# Patient Record
Sex: FEMALE | Race: WHITE | NOT HISPANIC OR LATINO | Employment: FULL TIME | URBAN - NONMETROPOLITAN AREA
[De-identification: names, ages, dates, MRNs, and addresses within clinical notes are randomized per-mention and may not be internally consistent; named-entity substitution may affect disease eponyms.]

---

## 2017-02-20 ENCOUNTER — HOSPITAL ENCOUNTER (OUTPATIENT)
Dept: RADIOLOGY | Facility: OTHER | Age: 27
End: 2017-02-20
Attending: OBSTETRICS & GYNECOLOGY

## 2017-02-20 DIAGNOSIS — N83.202 CYST OF LEFT OVARY: ICD-10-CM

## 2017-02-20 DIAGNOSIS — N83.201 CYST OF RIGHT OVARY: ICD-10-CM

## 2017-04-10 ENCOUNTER — HISTORY (OUTPATIENT)
Dept: FAMILY MEDICINE | Facility: OTHER | Age: 27
End: 2017-04-10

## 2017-04-10 ENCOUNTER — OFFICE VISIT - GICH (OUTPATIENT)
Dept: FAMILY MEDICINE | Facility: OTHER | Age: 27
End: 2017-04-10

## 2017-04-10 DIAGNOSIS — J06.9 ACUTE UPPER RESPIRATORY INFECTION: ICD-10-CM

## 2017-04-10 DIAGNOSIS — H92.01 OTALGIA OF RIGHT EAR: ICD-10-CM

## 2017-04-10 DIAGNOSIS — H69.81 OTHER SPECIFIED DISORDERS OF EUSTACHIAN TUBE, RIGHT EAR: ICD-10-CM

## 2017-04-10 DIAGNOSIS — B97.89 OTHER VIRAL AGENTS AS THE CAUSE OF DISEASES CLASSIFIED ELSEWHERE: ICD-10-CM

## 2017-08-14 ENCOUNTER — OFFICE VISIT - GICH (OUTPATIENT)
Dept: FAMILY MEDICINE | Facility: OTHER | Age: 27
End: 2017-08-14

## 2017-08-14 ENCOUNTER — HISTORY (OUTPATIENT)
Dept: FAMILY MEDICINE | Facility: OTHER | Age: 27
End: 2017-08-14

## 2017-08-14 DIAGNOSIS — M67.40 GANGLION: ICD-10-CM

## 2017-09-29 ENCOUNTER — HISTORY (OUTPATIENT)
Dept: FAMILY MEDICINE | Facility: OTHER | Age: 27
End: 2017-09-29

## 2017-09-29 ENCOUNTER — OFFICE VISIT - GICH (OUTPATIENT)
Dept: FAMILY MEDICINE | Facility: OTHER | Age: 27
End: 2017-09-29

## 2017-09-29 DIAGNOSIS — J01.00 ACUTE MAXILLARY SINUSITIS: ICD-10-CM

## 2017-10-27 ENCOUNTER — COMMUNICATION - GICH (OUTPATIENT)
Dept: FAMILY MEDICINE | Facility: OTHER | Age: 27
End: 2017-10-27

## 2017-10-27 DIAGNOSIS — Z00.00 ENCOUNTER FOR GENERAL ADULT MEDICAL EXAMINATION WITHOUT ABNORMAL FINDINGS: ICD-10-CM

## 2017-11-27 ENCOUNTER — HISTORY (OUTPATIENT)
Dept: FAMILY MEDICINE | Facility: OTHER | Age: 27
End: 2017-11-27

## 2017-11-27 ENCOUNTER — OFFICE VISIT - GICH (OUTPATIENT)
Dept: FAMILY MEDICINE | Facility: OTHER | Age: 27
End: 2017-11-27

## 2017-11-27 DIAGNOSIS — Z00.00 ENCOUNTER FOR GENERAL ADULT MEDICAL EXAMINATION WITHOUT ABNORMAL FINDINGS: ICD-10-CM

## 2017-11-27 DIAGNOSIS — E03.8 OTHER SPECIFIED HYPOTHYROIDISM: ICD-10-CM

## 2017-11-27 DIAGNOSIS — Z23 ENCOUNTER FOR IMMUNIZATION: ICD-10-CM

## 2017-11-27 LAB
T4 FREE SERPL-MCNC: 0.93 NG/DL (ref 0.58–1.64)
TSH - HISTORICAL: 3.76 UIU/ML (ref 0.34–5.6)

## 2017-12-04 ENCOUNTER — AMBULATORY - GICH (OUTPATIENT)
Dept: FAMILY MEDICINE | Facility: OTHER | Age: 27
End: 2017-12-04

## 2017-12-27 NOTE — PROGRESS NOTES
"Patient Information     Patient Name MRN Sex Amanda Ritchie [id number] Female 1990      Progress Notes by Amanda Joel NP at 2017  5:30 PM     Author:  Amanda Joel NP Service:  (none) Author Type:  PHYS- Nurse Practitioner     Filed:  10/1/2017  4:38 PM Encounter Date:  2017 Status:  Signed     :  Amanda Joel NP (PHYS- Nurse Practitioner)            SUBJECTIVE:   Amanda Mckeon is a 26 y.o. female  who complains of sinus and nasal congestion, sneezing and sore throat for 8   days. She  denies a history of fevers,  Ear pain, SOB or difficulty breathing. She doesn't have a history of asthma and isn't a smoker.She has been treating symptoms with Claritin and Equate Brand Cough and Cold Medicine. Also blood pressure is elevated today. Denies history of hypertension. No chest pain, SOB or difficulty breathing.  has a BP cough at home.  OBJECTIVE:  She  appears well.  BP (!) 168/110  Pulse 100  Temp 97.6  F (36.4  C) (Tympanic)   Resp 20  Ht 1.626 m (5' 4\")  Breastfeeding? No   Ears normal.  Throat and pharynx normal.  Neck supple. No adenopathy in the neck. Nose is congested. Maxillary sinuses tender bilaterall. The chest is clear, without wheezes or rales. Heart has regular rate and rhythm, no murmur.    ASSESSMENT:   Diagnoses: Maxillary Sinusitis  Elevated BP    PLAN:  Please check blood pressure at home on 2 or 3 separate occasions, if it remains elevated, please follow up with PCP  Treat with Omnicef 300 mgs PO BID 10 days :\"push fluids\",\"rest\", if symptoms persist or worsen Call or return to clinic        "

## 2017-12-28 NOTE — PATIENT INSTRUCTIONS
Patient Information     Patient Name MRN Sex Amanda Ritchie [id number] Female 1990      Patient Instructions by Amanda Joel NP at 2017  5:30 PM     Author:  Amanda Joel NP Service:  (none) Author Type:  PHYS- Nurse Practitioner     Filed:  2017  6:03 PM Encounter Date:  2017 Status:  Signed     :  Amanda Joel NP (PHYS- Nurse Practitioner)               Index Uzbek Related topics   Sinusitis   ________________________________________________________________________  KEY POINTS    Sinusitis is swelling and irritation of the linings of the sinuses, the hollow spaces in the bones of your face and front of your skull.    You may need to take medicine for your stuffy nose, pain, infection, or swelling.    Use saline nasal sprays or rinses to help wash out nasal passages if you have a sinus infection. A humidifier can add moisture to the air also.  ________________________________________________________________________  What is sinusitis?  Sinusitis is swelling and irritation of the linings of the sinuses. The sinuses are hollow spaces in the bones of your face and front of your skull. They connect with the nose through small openings. Like the nose, they are lined with tissue (membranes) that make mucus. Mucus drains through the small openings to the nose.  What is the cause?  The passageways from the sinuses to the nose are very narrow. When drainage of mucus from the sinuses is blocked, the sinuses get swollen and irritated. They may also become infected with bacteria, a virus, or even fungus. Allergies or irritation from pollen, mold, dust, or smoke can also cause swelling of the sinuses. Sometimes a tooth infection spreads to the sinuses.  You may be more likely to get sinus congestion and infections if you have:    Severe or untreated seasonal or year-round allergies    Injured the bones in your nose    A deformity of the nose that  causes the sinuses not to drain properly    Small growths called polyps in the sinuses that partially block the sinus openings  What are the symptoms?  Symptoms may include:    Feeling of fullness or pressure in your face or head    A headache that is most painful when you first wake up in the morning or when you bend over and put your head down    Pain in your face    Aching in the upper jaw and teeth    Runny or stuffy nose    Cough, especially at night    Fluid draining down the back of your throat (postnasal drip)    Sore throat, especially in the morning or evening  How is it diagnosed?  Your healthcare provider will ask about your symptoms and medical history and examine you. Tests are often not needed but may include:    X-ray of your sinuses    CT scan, which uses X-rays and a computer to show detailed pictures of the sinuses  How is it treated?  Several kinds of medicine may help:    Nonprescription pain medicine, such as acetaminophen, ibuprofen, or naproxen. Read the label and take as directed. Unless recommended by your healthcare provider, you should not take these medicines for more than 10 days.    Nonsteroidal anti-inflammatory medicines (NSAIDs), such as ibuprofen, naproxen, and aspirin, may cause stomach bleeding and other problems. These risks increase with age.    Acetaminophen may cause liver damage or other problems. Unless recommended by your provider, don't take more than 3000 milligrams (mg) in 24 hours. To make sure you don t take too much, check other medicines you take to see if they also contain acetaminophen. Ask your provider if you need to avoid drinking alcohol while taking this medicine.    Decongestants pills or nasal sprays to reduce swelling in your nose and sinuses and lessen the amount of mucus. Use decongestants as directed. If you are using a nonprescription nasal-spray decongestant, generally you should not use it for more than 3 days. After 3 days it may make your symptoms  worse. Ask your healthcare provider if it is OK for you to use a nasal spray decongestant longer than this.    Antihistamine tablets or a nasal spray to treat the allergies during your allergy season or, in some cases, year-round. Antihistamines block the effect of a chemical your body makes when you have an allergic reaction.    Antibiotics, if your provider thinks you might have a sinus infection    Steroid nasal spray if your provider thinks it may help clear your sinuses  If sinusitis is still a problem despite treatment, you may be referred to an allergy specialist or an ear, nose, and throat (ENT) specialist. The allergy specialist will check for and help treat your allergies to lessen the chance of having sinusitis. The ENT specialist will check for polyps or a deformed bone that may be blocking your sinuses. You may need surgery to create an extra or enlarged passageway to help your sinuses drain more easily.  Depending on what caused the sinusitis and how severe it is, it may last for days or weeks. For most cases of sinusitis, the symptoms get better gradually over 3 to 10 days.  How can I take care of myself?  Follow the full course of treatment prescribed by your healthcare provider. In addition:    If you are taking an antibiotic, take all of it as directed by your provider. If you stop taking the medicine when your symptoms are gone but before you have taken all of the medicine, symptoms may come back.    Don t smoke, and stay away from others who are smoking.    If you have allergies, try to avoid the things you are allergic to, like animal dander. Use medicine to keep your nose and sinuses open.    Use a humidifier to put more moisture in the air. This can help to open blocked sinuses and relieve pain. Avoid steam vaporizers because they can cause burns. Be sure to keep the humidifier clean, as recommended in the 's instructions. It's important to keep bacteria and mold from growing in  the water container.    Use saline nasal sprays or rinses to help wash out nasal passages if you have a sinus infection. You may use the sprays also to prevent infections.    Get plenty of rest.    Drink more fluids to keep the mucus as thin as possible so your sinuses can drain more easily.    Raise the head of your bed slightly or sleep on extra pillows to help your sinuses drain.    Put warm, moist cloths on painful areas.  Ask your healthcare provider:    How and when you will get your test results    How long it will take to recover    If there are activities you should avoid and when you can return to your normal activities    How to take care of yourself at home    What symptoms or problems you should watch for and what to do if you have them  Make sure you know when you should come back for a checkup. Keep all appointments for provider visits or tests.  How can I help prevent sinusitis?    Treat your colds and allergies promptly. Use decongestants as soon as you start having symptoms, and before you fly, travel to high altitudes, or swim in deep water.    If you smoke, try to quit. Talk to your healthcare provider about ways to quit smoking.  Developed by [employer].  Adult Advisor 2016.3 published by [employer].  Last modified: 2016-03-23  Last reviewed: 2015-08-27  This content is reviewed periodically and is subject to change as new health information becomes available. The information is intended to inform and educate and is not a replacement for medical evaluation, advice, diagnosis or treatment by a healthcare professional.  References   Adult Advisor 2016.3 Index    Copyright   2016 [employer], a division of [employer] All rights reserved.

## 2017-12-28 NOTE — ADDENDUM NOTE
Patient Information     Patient Name MRN Amanda Lr [id number] Female 1990      Addendum Note by Yesenia Gardner MD at 2017 11:34 AM     Author:  Yesenia Gardner MD Service:  (none) Author Type:  Physician     Filed:  2017 11:34 AM Encounter Date:  2017 Status:  Signed     :  Yesenia Gardner MD (Physician)       Addended by: YESENIA GARDNER on: 2017 11:34 AM        Modules accepted: Orders

## 2017-12-28 NOTE — PROGRESS NOTES
Patient Information     Patient Name MRN Amanda Lr [id number] Female 1990      Progress Notes by Johnathon Fitch MD at 2017  4:30 PM     Author:  Johnathon Fitch MD Service:  (none) Author Type:  Physician     Filed:  2017  5:17 PM Encounter Date:  2017 Status:  Signed     :  Johnathon Fitch MD (Physician)            SUBJECTIVE:  26 y.o. female who presents for evaluation of a lump on her left wrist. It's on the volar aspect at the base of the thumb. She noticed it about 2 months ago. She DrMaranda locke about it who told her that she thought it was a ganglion. It bothers her some, when she's been gardening or very picking but otherwise is not too bothersome.    Additional Review of Systems: see HPI:   No new symptoms otherwise    Past Medical History:     Diagnosis  Date     Cyst of right ovary 2016     Hypothyroidism 2015     Recurrent cold sores 2016        Current Outpatient Prescriptions       Medication  Sig Dispense Refill     acyclovir (ZOVIRAX) 800 mg tablet Take 1 tablet by mouth 5 times daily. 50 tablet 3     benzonatate (TESSALON) 100 mg capsule Take 1 capsule by mouth 3 times daily if needed for Cough. 30 capsule 0     codeine-guaiFENesin (ROBITUSSIN AC)  mg/5 mL liquid Take 10 mL by mouth at bedtime if needed for Cough. Max dose 60 mL per 24 hrs. 100 mL 0     levothyroxine (SYNTHROID) 88 mcg tablet Take 1 tablet by mouth once daily. 90 tablet 4     norgestimate-ethinyl estradiol (TRI-LO-SPRINTEC) 0.18/0.215/0.25-25 mg-mcg tablet Take 1 tablet by mouth once daily. 84 tablet 3     triamcinolone, 55 mcg each actuation, nasal (NASACORT AQ) 55 mcg nasal spray Inhale 2 Sprays into both nostrils once daily. 16.5 g 5     No current facility-administered medications for this visit.      Medications have been reviewed by me and are current to the best of my knowledge and ability.      Allergies as of 2017 - Kian as Reviewed 2017  "     Allergen  Reaction Noted     Amoxicillin Hives 05/27/2014        OBJECTIVE:  /80  Pulse 76  Ht 1.626 m (5' 4\")  EXAM:  General Appearance: Pleasant, alert, appropriate appearance for age. No acute distress  Musculoskeletal Exam: Examination of the left wrist reveals a small 8 mm lump on the volar aspect of the wrist just over the junction of the radius with the metacarpal bones at the base of the thumb. It's freely mobile, nontender, and there is no erythema.      ASSESSMENT/PLAN:  Ganglion cyst. This appears quite typical. I explained the pathophysiology, rafa her picture of the mesentery which it arises, and explained treatment. At this point she is just going to watch and wait and see what it does see if it gets smaller. If it bothers her I suggested she call and we will schedule her for orthopedic consultation.  Johnathon Fitch MD ....................  8/14/2017   5:17 PM            "

## 2017-12-28 NOTE — TELEPHONE ENCOUNTER
Patient Information     Patient Name MRN Amanda Lr [id number] Female 1990      Telephone Encounter by Marquita Moore RN at 10/31/2017  3:20 PM     Author:  Marquita Moore RN Service:  (none) Author Type:  NURS- Registered Nurse     Filed:  10/31/2017  3:32 PM Encounter Date:  10/27/2017 Status:  Signed     :  Marquita Moore RN (NURS- Registered Nurse)            Hormones    Office visit in the past 12 months or per provider note.    Last visit with YESENIA GARDNER was on: 2017 in [facility]  Next visit with YESENIA GARDNER is on: 2017 in [facility]  Next visit with Family Practice is on: 2017 in [facility]    Max refill for 12 months from last office visit or per provider note.    Patient is due for medication management appointment. Limited refill provided at this time. Noted upcoming appointment.  Prescription refilled per RN Medication Refill Policy.................... Marquita Moore RN ....................  10/31/2017   3:31 PM

## 2017-12-28 NOTE — PROGRESS NOTES
Patient Information     Patient Name MRAmanda Rocha [id number] Female 1990      Progress Notes by Johnathon Fitch MD at 2017  3:45 PM     Author:  Johnathon Fitch MD Service:  (none) Author Type:  Physician     Filed:  2017  4:52 PM Encounter Date:  2017 Status:  Signed     :  Johnathon Fitch MD (Physician)            HPI-Comes in today for comprehensive evaluation.  She's been feeling fine. She needs medications refilled. She is on the birth control pill and her menstrual periods are normal and regular. She and her  are going on their honeymoon in January to [place] and she is concerned about the Zika virus. She would like to be tested in the spring before she attempts of pregnancy.    She has no new problems or concerns otherwise. She would like to get immunizations updated. She also would like to get a Pap test done now because of attempting pregnancy in the spring.    ROS:  See HPI.  A comprehensive review of systems is otherwise negative.    Past Medical History:     Diagnosis  Date     Cyst of right ovary 2016     Hypothyroidism 2015     Recurrent cold sores 2016     Past Surgical History:      Procedure  Laterality Date     BREAST REDUCTION       No family history on file.  Social History     Substance Use Topics       Smoking status: Never Smoker     Smokeless tobacco: Never Used     Alcohol use No     Allergies      Allergen   Reactions     Amoxicillin  Hives     Childhood        Current Outpatient Prescriptions:      acyclovir (ZOVIRAX) 800 mg tablet, Take 1 tablet by mouth 5 times daily., Disp: 50 tablet, Rfl: 3     levothyroxine (SYNTHROID) 88 mcg tablet, Take 1 tablet by mouth once daily., Disp: 90 tablet, Rfl: 4     norgestimate-ethinyl estradiol (ORTHO TRI-CYCLEN LO, 28,) 0.18/0.215/0.25-25 mg-mcg tablet, Take 1 tablet by mouth once daily., Disp: 84 tablet, Rfl: 3     triamcinolone, 55 mcg each actuation, nasal  (NASACORT AQ) 55 mcg nasal spray, Inhale 2 Sprays into both nostrils once daily., Disp: 16.5 g, Rfl: 5  Medications have been reviewed by me and are current to the best of my knowledge and ability.      EXAM:she is a pleasant healthy-appearing 27 y.o. female in no apparent distress.  She answers questions appropriately and appears well-kempt.  HEENT exam is within normal limits.  .  Neck is supple with good carotid pulses.  No bruits are heard.  No thyromegaly or adenopathy.  Lungs are clear with good air movement.  No rales, rhonchi, or wheezes are heard--  Breasts are normal in appearance, palpably free of masses.    There is no skin dimpling or nipple changes and axilla are negative.  Cardiac exam reveals a regular rhythm with no murmur or gallop appreciated.  Peripheral pulses are equal and strong. Repeat blood pressure by me was 132/88.  Abdomen-soft and nontender with no hepatosplenomegaly or masses.  Bowel sounds are normal and no bruits are heard  Back is straight with no scoliosis or CVA tenderness.     exam was done, and a female nurse in the room was offered and declined, and the exam reveals normal external genitalia.  Vagina is normal. Cervix is normal in appearance. A Pap smear is done..  Uterus is small, anterior, mobile and nontender..  Both adnexa are normal.     Extremities no edema. Good distal pulses.  Neurologic exam is intact and nonfocal  Skin is free of significant lesions.  No rashes are seen  Lymph nodes are nonpalpable in cervical, axillary, and inguinal areas.  Psychiatric: Alert and oriented with normal mood and affect    Lab-thyroid studies are pending  Imaging-none indicated at this time    Assessment-preventive healthcare-normal exam. Tetanus status and hepatitis A vaccines were recommended and given. Others are up-to-date. Thyroid studies done, she'll be notified of the result. Past rooms done as well and she'll be notified of that.    Plan-she'll be notified of lab results and  meds refilled. She'll return in the spring for viral testing for Zika Virus prior to attempting pregnancy. Discussed her blood pressure and she'll continue to monitor with a goal of 130/80 or less.    Johnathon Fitch MD ....................  11/27/2017   4:47 PM

## 2017-12-30 NOTE — NURSING NOTE
Patient Information     Patient Name MRN Amanda Lr [id number] Female 1990      Nursing Note by Mariama Graham at 2017  5:30 PM     Author:  Mariama Graham Service:  (none) Author Type:  (none)     Filed:  2017  5:48 PM Encounter Date:  2017 Status:  Signed     :  Mariama Graham            States that she has had URI/ head congestion for over a week, no fever  Mariama Graham LPN...................2017   5:38 PM

## 2017-12-30 NOTE — NURSING NOTE
Patient Information     Patient Name MRN Sex Amanda Ritchie [id number] Female 1990      Nursing Note by Carly Hopper at 2017  3:45 PM     Author:  Carly Hopper Service:  (none) Author Type:  (none)     Filed:  2017  3:51 PM Encounter Date:  2017 Status:  Signed     :  Carly Hopper            Patient presents to clinic for physical  Carly Gilman ....................  2017   3:49 PM

## 2018-01-03 NOTE — PROGRESS NOTES
Patient Information     Patient Name MRN Sex Amanda Ritchie [id number] Female 1990      Progress Notes by Cassie Duke R.T. ([facility]) at 2017 11:50 AM     Author:  [facility] Service:  (none) Author Type:  [employer] - Registered Radiologic Technologist     Filed:  2017 11:50 AM Date of Service:  2017 11:50 AM Status:  Signed     :  Cassie Duke R.T. ([facility]) ([facility] - Registered Radiologic Technologist)            Falls Risk Criteria:    Age 65 and older or under age 4        Sensory deficits    Poor vision    Use of ambulatory aides    Impaired judgment    Unable to walk independently    Meets High Risk criteria for falls:  no

## 2018-01-04 NOTE — NURSING NOTE
Patient Information     Patient Name MRN Sex Amanda Ritchie [id number] Female 1990      Nursing Note by Carly Hopper at 4/10/2017  5:30 PM     Author:  Carly Hopper Service:  (none) Author Type:  (none)     Filed:  4/10/2017  5:59 PM Encounter Date:  4/10/2017 Status:  Signed     :  Carly Hopper            Patient presents to clinic with sore throat and right ear pain.  Carly Hopper LPN....................  4/10/2017   5:49 PM

## 2018-01-04 NOTE — PATIENT INSTRUCTIONS
Patient Information     Patient Name MRN Sex Amanda Ritchie [id number] Female 1990      Patient Instructions by Olivia Hurt NP at 4/10/2017  5:30 PM     Author:  Olivia Hurt NP Service:  (none) Author Type:  PHYS- Nurse Practitioner     Filed:  4/10/2017  6:08 PM Encounter Date:  4/10/2017 Status:  Signed     :  Oilvia Hurt NP (PHYS- Nurse Practitioner)            Robitussin with codeine at bedtime as needed for cough    Tessalon every 8 hours as needed for cough       Symptoms likely due to virus. No antibiotic is needed at this time.       Most coughs are caused by a viral infection.   Usually coughs can last 2 to 3 weeks. Sometimes the cough becomes loose (wet) for a few days, and your child coughs up a lot of phlegm (mucus). This is usually a sign that the end of the illness is near.    Most sore throats are caused by viruses and are part of a cold. About 10% of sore throats are caused by strep bacteria.    Encouraged fluids and rest.    May use symptomatic care with tylenol or ibuprofen.     Using a humidifier works well to break up the congestion.     Elevate the mattress to 15 degrees in order to help with the congestion.    Frequent swallows of cool liquid.      Oatmeal or honey coats the throat and some patients find it soothes the pain.     Salt water gargles as needed    Return to clinic with change/worsening of symptoms or concerns.

## 2018-01-04 NOTE — PROGRESS NOTES
Patient Information     Patient Name MRN Sex Amanda Ritchie [id number] Female 1990      Progress Notes by Olivia Hurt NP at 4/10/2017  5:30 PM     Author:  Olivia Hurt NP Service:  (none) Author Type:  PHYS- Nurse Practitioner     Filed:  4/10/2017  7:36 PM Encounter Date:  4/10/2017 Status:  Signed     :  Olivia Hurt NP (PHYS- Nurse Practitioner)            HPI:    Amanda Mckeon is a 26 y.o. female who presents to clinic today for URI.  Cough x 4 days.  Sore throat, chest congestion x 3 days.  Right ear pain started today.  Both ears feel plugged.   Sore throat worsening yesterday morning.  Runny and stuffy started yesterday.  Non productive cough.  Chest tightness.  Feeling winded with activity.  Headache from coughing.  Upper body aches initially.  No fevers, chills or sweats.  Appetite normal.  No vomiting or diarrhea.  Energy decreased.  Taking cough syrup, daytime/night time cold, cough drops.            Past Medical History:     Diagnosis  Date     Cyst of right ovary 2016     Hypothyroidism 2015     Recurrent cold sores 2016     Past Surgical History:      Procedure  Laterality Date     BREAST REDUCTION       Social History     Substance Use Topics       Smoking status: Never Smoker     Smokeless tobacco: Never Used     Alcohol use No     Current Outpatient Prescriptions       Medication  Sig Dispense Refill     acyclovir (ZOVIRAX) 800 mg tablet Take 1 tablet by mouth 5 times daily. 50 tablet 3     levothyroxine (SYNTHROID) 88 mcg tablet Take 1 tablet by mouth once daily. 90 tablet 4     norgestimate-ethinyl estradiol (TRI-LO-SPRINTEC) 0.18/0.215/0.25-25 mg-mcg tablet Take 1 tablet by mouth once daily. 84 tablet 3     triamcinolone, 55 mcg each actuation, nasal (NASACORT AQ) 55 mcg nasal spray Inhale 2 Sprays into both nostrils once daily. 16.5 g 5     No current facility-administered medications for this visit.      Medications have been reviewed  by me and are current to the best of my knowledge and ability.    Allergies      Allergen   Reactions     Amoxicillin  Hives     Childhood        ROS:  Refer to HPI    /68  Pulse 84  Temp 98.7  F (37.1  C) (Tympanic)   Resp 18  LMP 03/29/2017  Breastfeeding? No      EXAM:  General Appearance: Well appearing adult female, appropriate appearance for age. No acute distress  Head: normocephalic, atraumatic  Ears: Left TM with bony landmarks appreciated, no erythema, no effusion, no bulging, no purulence.  Right TM with bony landmarks appreciated, erythema with serous effusion with bulging, no purulence.   Left auditory canal clear.  Right auditory canal clear.  Normal external ears, non tender.  Eyes: conjunctivae normal, no drainage  Orophayrnx: moist mucous membranes, posterior pharynx without erythema, tonsils without hypertrophy, no erythema, no exudates or petechiae, no post nasal drip seen.    Neck: supple without adenopathy  Respiratory: normal chest wall and respirations.  Normal effort.  Clear to auscultation bilaterally, no wheezes or rhonchi or congestion, no cough appreciated  Cardiac: RRR with no murmurs  Psychological: normal affect, alert and pleasant        ASSESSMENT/PLAN:    ICD-10-CM    1. Viral URI with cough J06.9 benzonatate (TESSALON) 100 mg capsule     B97.89 codeine-guaiFENesin (ROBITUSSIN AC)  mg/5 mL liquid   2. Acute ear pain, right H92.01    3. Eustachian tube dysfunction, right H69.81          Likely viral illness.  No antibiotics indicated at this time.  Robitussin with codeine at bedtime PRN  Tessalon 100 mg TID PRN  Encouraged fluids  Symptomatic treatment - salt water gargles, honey, elevation, humidifier, sinus rinse/netti pot, lozenges, etc   Tylenol or ibuprofen PRN  Follow up if symptoms persist or worsen or concerns        Patient Instructions   Robitussin with codeine at bedtime as needed for cough    Tessalon every 8 hours as needed for cough       Symptoms  likely due to virus. No antibiotic is needed at this time.       Most coughs are caused by a viral infection.   Usually coughs can last 2 to 3 weeks. Sometimes the cough becomes loose (wet) for a few days, and your child coughs up a lot of phlegm (mucus). This is usually a sign that the end of the illness is near.    Most sore throats are caused by viruses and are part of a cold. About 10% of sore throats are caused by strep bacteria.    Encouraged fluids and rest.    May use symptomatic care with tylenol or ibuprofen.     Using a humidifier works well to break up the congestion.     Elevate the mattress to 15 degrees in order to help with the congestion.    Frequent swallows of cool liquid.      Oatmeal or honey coats the throat and some patients find it soothes the pain.     Salt water gargles as needed    Return to clinic with change/worsening of symptoms or concerns.

## 2018-01-16 ENCOUNTER — COMMUNICATION - GICH (OUTPATIENT)
Dept: FAMILY MEDICINE | Facility: OTHER | Age: 28
End: 2018-01-16

## 2018-01-16 ENCOUNTER — HISTORY (OUTPATIENT)
Dept: EMERGENCY MEDICINE | Facility: OTHER | Age: 28
End: 2018-01-16

## 2018-01-27 VITALS — DIASTOLIC BLOOD PRESSURE: 80 MMHG | HEIGHT: 64 IN | SYSTOLIC BLOOD PRESSURE: 122 MMHG | HEART RATE: 76 BPM

## 2018-01-27 VITALS
SYSTOLIC BLOOD PRESSURE: 130 MMHG | HEART RATE: 80 BPM | DIASTOLIC BLOOD PRESSURE: 110 MMHG | DIASTOLIC BLOOD PRESSURE: 98 MMHG | RESPIRATION RATE: 20 BRPM | HEIGHT: 64 IN | HEIGHT: 64 IN | SYSTOLIC BLOOD PRESSURE: 150 MMHG | HEART RATE: 100 BPM | TEMPERATURE: 97.6 F

## 2018-01-27 VITALS
SYSTOLIC BLOOD PRESSURE: 118 MMHG | TEMPERATURE: 98.7 F | HEART RATE: 84 BPM | RESPIRATION RATE: 18 BRPM | DIASTOLIC BLOOD PRESSURE: 68 MMHG

## 2018-02-01 ENCOUNTER — DOCUMENTATION ONLY (OUTPATIENT)
Dept: FAMILY MEDICINE | Facility: OTHER | Age: 28
End: 2018-02-01

## 2018-02-01 PROBLEM — M67.40 GANGLION CYST: Status: ACTIVE | Noted: 2017-08-14

## 2018-02-01 RX ORDER — TRIAMCINOLONE ACETONIDE 55 UG/1
2 SPRAY, METERED NASAL DAILY
COMMUNITY
Start: 2017-11-27 | End: 2018-08-10

## 2018-02-01 RX ORDER — LEVOTHYROXINE SODIUM 88 UG/1
88 TABLET ORAL DAILY
COMMUNITY
Start: 2017-11-28 | End: 2018-08-12

## 2018-02-01 RX ORDER — NORGESTIMATE AND ETHINYL ESTRADIOL 7DAYSX3 LO
1 KIT ORAL DAILY
COMMUNITY
Start: 2017-11-27 | End: 2018-08-10

## 2018-02-01 RX ORDER — ACYCLOVIR 800 MG/1
800 TABLET ORAL
COMMUNITY
Start: 2017-11-27 | End: 2018-08-10

## 2018-02-12 NOTE — PROGRESS NOTES
Patient Information     Patient Name MRN Sex     Amanda Alfred [id number] Female 1990      Progress Notes signed by Yesenia Gardner MD at 2017 10:21 AM      Author:  Yesenia Gardner MD Service:  (none) Author Type:  Physician     Filed:  2017 10:21 AM Encounter Date:  2017 Status:  Signed     :  Yesenia Gardner MD (Physician)            2017      AMANDA ALFRED  [address] [place] [address]      RE: AMANDA ALFRED  MR#: [id number]  : 1990        Dear Ms. Alfred:    The results of your Pap test returned normal, which is excellent news.  Pap test need only be repeated every 3 years.    Sincerely yours,        YESENIA GARDNER MD      WR/sn  D:2017 08:57:34  T:2017 09:25:05  VJID: [id number]  TJID: [id number]    cc:  Enclosure(s):

## 2018-02-13 ENCOUNTER — HOSPITAL ENCOUNTER (OUTPATIENT)
Dept: GENERAL RADIOLOGY | Facility: OTHER | Age: 28
Discharge: HOME | End: 2018-02-13
Attending: NURSE PRACTITIONER | Admitting: NURSE PRACTITIONER
Payer: COMMERCIAL

## 2018-02-13 ENCOUNTER — OFFICE VISIT (OUTPATIENT)
Dept: FAMILY MEDICINE | Facility: OTHER | Age: 28
End: 2018-02-13
Attending: NURSE PRACTITIONER
Payer: COMMERCIAL

## 2018-02-13 VITALS
SYSTOLIC BLOOD PRESSURE: 134 MMHG | DIASTOLIC BLOOD PRESSURE: 90 MMHG | TEMPERATURE: 100.8 F | HEART RATE: 104 BPM | HEIGHT: 64 IN

## 2018-02-13 DIAGNOSIS — J06.9 URI WITH COUGH AND CONGESTION: Primary | ICD-10-CM

## 2018-02-13 DIAGNOSIS — J06.9 URI WITH COUGH AND CONGESTION: ICD-10-CM

## 2018-02-13 LAB
BASOPHILS # BLD AUTO: 0 10E9/L (ref 0–0.2)
BASOPHILS NFR BLD AUTO: 0.4 %
DIFFERENTIAL METHOD BLD: NORMAL
EOSINOPHIL # BLD AUTO: 0 10E9/L (ref 0–0.7)
EOSINOPHIL NFR BLD AUTO: 0.7 %
ERYTHROCYTE [DISTWIDTH] IN BLOOD BY AUTOMATED COUNT: 12.4 % (ref 10–15)
HCT VFR BLD AUTO: 39.2 % (ref 35–47)
HGB BLD-MCNC: 13.3 G/DL (ref 11.7–15.7)
IMM GRANULOCYTES # BLD: 0.1 10E9/L (ref 0–0.4)
IMM GRANULOCYTES NFR BLD: 2.5 %
LYMPHOCYTES # BLD AUTO: 1.4 10E9/L (ref 0.8–5.3)
LYMPHOCYTES NFR BLD AUTO: 31.3 %
MCH RBC QN AUTO: 29.3 PG (ref 26.5–33)
MCHC RBC AUTO-ENTMCNC: 33.9 G/DL (ref 31.5–36.5)
MCV RBC AUTO: 86 FL (ref 78–100)
MONOCYTES # BLD AUTO: 0.6 10E9/L (ref 0–1.3)
MONOCYTES NFR BLD AUTO: 13.6 %
NEUTROPHILS # BLD AUTO: 2.3 10E9/L (ref 1.6–8.3)
NEUTROPHILS NFR BLD AUTO: 51.5 %
PLATELET # BLD AUTO: 322 10E9/L (ref 150–450)
RBC # BLD AUTO: 4.54 10E12/L (ref 3.8–5.2)
WBC # BLD AUTO: 4.5 10E9/L (ref 4–11)

## 2018-02-13 PROCEDURE — 99214 OFFICE O/P EST MOD 30 MIN: CPT | Performed by: NURSE PRACTITIONER

## 2018-02-13 PROCEDURE — 85025 COMPLETE CBC W/AUTO DIFF WBC: CPT | Performed by: NURSE PRACTITIONER

## 2018-02-13 PROCEDURE — 36415 COLL VENOUS BLD VENIPUNCTURE: CPT | Performed by: NURSE PRACTITIONER

## 2018-02-13 PROCEDURE — 71046 X-RAY EXAM CHEST 2 VIEWS: CPT

## 2018-02-13 RX ORDER — BENZONATATE 100 MG/1
100 CAPSULE ORAL 3 TIMES DAILY PRN
Qty: 30 CAPSULE | Refills: 0 | Status: SHIPPED | OUTPATIENT
Start: 2018-02-13 | End: 2018-08-10

## 2018-02-13 RX ORDER — FLUTICASONE PROPIONATE 50 MCG
1 SPRAY, SUSPENSION (ML) NASAL DAILY
Qty: 1 BOTTLE | Refills: 0 | Status: SHIPPED | OUTPATIENT
Start: 2018-02-13 | End: 2018-08-10

## 2018-02-13 ASSESSMENT — PAIN SCALES - GENERAL: PAINLEVEL: SEVERE PAIN (7)

## 2018-02-13 NOTE — MR AVS SNAPSHOT
After Visit Summary   2/13/2018    Amanda Mckeon    MRN: [id number]           Patient Information     Date Of Birth          1990        Visit Information        Provider Department      2/13/2018 5:30 PM Grace Singleton NP [facility]        Today's Diagnoses     URI with cough and congestion    -  1      Care Instructions    Cold Medicines   What are cold medicines?  Symptoms of the common cold start gradually over several days and usually last about two weeks. Symptoms may include sneezing, a stuffy or runny nose, sore throat, cough, watery eyes, mild headache, or body aches. A cold will go away on its own without treatment. However, there are many nonprescription products that may help relieve some of the symptoms of a cold. Cold medicines often contain more than one ingredient and are used to treat more than one symptom. Read the labels and buy products that have only the ingredients that you need. If you are not sure which medicine is best, ask your pharmacist.  How do they work?  Decongestants reduce swelling in your nose and sinuses. They may also lessen the amount of mucus made by your nose. If you use decongestants more often than directed, your stuffy nose may get worse.   Antihistamines block the effect of histamine. Histamine is a chemical your body makes when you have an allergic reaction. Antihistamines are most often used to treat itchy or watery eyes or a stuffy or runny nose caused by an allergy. Antihistamines may not help a stuffy or runny nose caused by a cold because they can make mucus thick and dry.  Mucolytics are medicines that make mucus thinner so that it is easier to cough up out of your throat and lungs.  Expectorants are cough medicines that may help to keep the mucus thin and bring up mucus from the lungs when you cough. This may relieve chest congestion and make it easier to breathe.   Cough suppressants (antitussives) are medicines that  lessen the urge to cough. They may give relief from a dry, hacking cough. If you have a cough that is wet sounding and produces mucus, it is important for you to cough the mucus up out of your lungs. For this reason, cough suppressants are not recommended for a wet sounding cough.  Fever and pain relievers, such as acetaminophen, aspirin, or other nonsteroidal anti-inflammatory drugs (NSAIDs), are often included in cold medicine. Read labels carefully to avoid taking more medicine than you need.  What else do I need to know about this medicine?    Talk to your healthcare provider if your symptoms start suddenly or you have severe symptoms. This may mean you have something more serious than a cold.    Follow the directions that come with your medicine, including information about food or alcohol. Make sure you know how and when to take your medicine. Do not take more or less than you are supposed to take.    Try to get all of your medicine at the same place. Your pharmacist can help make sure that all of your medicines are safe to take together.    Keep a list of your medicines with you. List all of the prescription medicines, nonprescription medicines, supplements, natural remedies, and vitamins that you take. Tell all healthcare providers who treat you about all of the products you are taking.    Many medicines have side effects. A side effect is a symptom or problem that is caused by the medicine. Ask your healthcare provider or pharmacist what side effects the medicine may cause and what you should do if you have side effects.    Nonsteroidal anti-inflammatory medicines (NSAIDs), such as ibuprofen, naproxen, and aspirin, may cause stomach bleeding and other problems. These risks increase with age. Read the label and take as directed. Unless recommended by your healthcare provider, do not take for more than 10 days for any reason.    Acetaminophen may cause liver damage or other problems. Unless recommended by  "your provider, don't take more than 3000 milligrams (mg) in 24 hours. To make sure you don t take too much, check other medicines you take to see if they also contain acetaminophen. Ask your provider if you need to avoid drinking alcohol while taking this medicine.  If you have any questions, ask your healthcare provider or pharmacist for more information. Be sure to keep all appointments for provider visits or tests.                  Follow-ups after your visit        Follow-up notes from your care team     Return if symptoms worsen or fail to improve.      Future tests that were ordered for you today     Open Future Orders        Priority Expected Expires Ordered    XR Chest 2 Views Routine 2/13/2018 2/13/2019 2/13/2018            Who to contact     If you have questions or need follow up information about today's clinic visit or your schedule please contact [facility] directly at [phone].  Normal or non-critical lab and imaging results will be communicated to you by [employer], letter or phone within 4 business days after the clinic has received the results. If you do not hear from us within 7 days, please contact the clinic through [employer] or phone. If you have a critical or abnormal lab result, we will notify you by phone as soon as possible.  Submit refill requests through [employer] or call your pharmacy and they will forward the refill request to us. Please allow 3 business days for your refill to be completed.          Additional Information About Your Visit        [employer] Information     [employer] lets you send messages to your doctor, view your test results, renew your prescriptions, schedule appointments and more. To sign up, go to [email or web address] . Click on \"Log in\" on the left side of the screen, which will take you to the Welcome page. Then click on \"Sign up Now\" on the right side of the page.     You will be asked to enter the access code listed below, as well as some " "personal information. Please follow the directions to create your username and password.     Your access code is: [email or web address]  Expires: 2018  7:36 PM     Your access code will  in 90 days. If you need help or a new code, please call your [facility] clinic or [phone].        Care EveryWhere ID     This is your Care EveryWhere ID. This could be used by other organizations to access your [facility] medical records  [id number]        Your Vitals Were     Pulse Temperature Height Last Period Breastfeeding?       104 100.8  F (38.2  C) (Tympanic) 5' 4\" (1.626 m) 2018 (Approximate) No        Blood Pressure from Last 3 Encounters:   18 134/90   17 (!) 130/98   17 (!) 150/110    Weight from Last 3 Encounters:   16 185 lb 12.8 oz (84.3 kg)   07/13/15 200 lb (90.7 kg)   06/04/15 205 lb 3.2 oz (93.1 kg)              We Performed the Following     CBC with platelets and differential          Today's Medication Changes          These changes are accurate as of 18  7:36 PM.  If you have any questions, ask your nurse or doctor.               Start taking these medicines.        Dose/Directions    benzonatate 100 MG capsule   Commonly known as:  TESSALON   Used for:  URI with cough and congestion   Started by:  Grace Singleton NP        Dose:  100 mg   Take 1 capsule (100 mg) by mouth 3 times daily as needed for cough   Quantity:  30 capsule   Refills:  0       fluticasone 50 MCG/ACT spray   Commonly known as:  FLONASE   Used for:  URI with cough and congestion   Started by:  Grace Singleton NP        Dose:  1 spray   Spray 1 spray into both nostrils daily   Quantity:  1 Bottle   Refills:  0            Where to get your medicines      These medications were sent to [facility] [address] [place], [place] [address], [facility] [address]     Phone:  [phone]     benzonatate 100 MG capsule    fluticasone 50 MCG/ACT spray    "             Primary Care Provider Office Phone # Fax #    Johnathon Fitch [phone] [address]  [facility] [address]        Equal Access to Services     MELONIECHERELLE JOHN : Yuridia lizzy olson jose Christie, waoumarda luqjoe, qaybta kamanasada luis, yulisa winslow manuelrylie simon laRomuloumer voss. So [facility] [phone].    ATENCIÓN: Si habla español, tiene a neves disposición servicios gratuitos de asistencia lingüística. Llame al [phone].    We comply with applicable federal civil rights laws and [place] laws. We do not discriminate on the basis of race, color, national origin, age, disability, sex, sexual orientation, or gender identity.            Thank you!     Thank you for choosing [facility] [place]  for your care. Our goal is always to provide you with excellent care. Hearing back from our patients is one way we can continue to improve our services. Please take a few minutes to complete the written survey that you may receive in the mail after your visit with us. Thank you!             Your Updated Medication List - Protect others around you: Learn how to safely use, store and throw away your medicines at www.disposemymeds.org.          This list is accurate as of 2/13/18  7:36 PM.  Always use your most recent med list.                   Brand Name Dispense Instructions for use Diagnosis    acyclovir 800 MG tablet    ZOVIRAX     Take 800 mg by mouth 5 times daily        benzonatate 100 MG capsule    TESSALON    30 capsule    Take 1 capsule (100 mg) by mouth 3 times daily as needed for cough    URI with cough and congestion       fluticasone 50 MCG/ACT spray    FLONASE    1 Bottle    Spray 1 spray into both nostrils daily    URI with cough and congestion       levothyroxine 88 MCG tablet    SYNTHROID/LEVOTHROID     Take 88 mcg by mouth daily        norgestim-eth estrad triphasic 0.18/0.215/0.25 MG-25 MCG per tablet    ORTHO TRI-CYCLEN LO     Take 1 tablet by  mouth daily        triamcinolone 55 MCG/ACT Inhaler    NASACORT     Spray 2 sprays into both nostrils daily

## 2018-02-13 NOTE — TELEPHONE ENCOUNTER
"Patient Information     Patient Name MRN Amanda Lr [id number] Female 1990      Telephone Encounter by Shantelle Coley RN at 2018  1:58 PM     Author:  Shantelle Coley RN Service:  (none) Author Type:  NURS- Registered Nurse     Filed:  2018  2:58 PM Encounter Date:  2018 Status:  Signed     :  Shantelle Coley RN (NURS- Registered Nurse)            Pt's mom calls today stating pt has been vomiting with severe back pain.  She states the vomit has been about 20 times today and that pt has not been able to keep anything down including fluids.  Pt states vomiting started this morning at 1 am.  After speaking with Pt's mom I called pt to verify symptoms.  She states she has not thrown up 20 times but over ten.  She states she has had diarrhea as well today- she states \"Everything is liquid\" so last void was not known.  She explains her back pain as low middle back through the where her stomach is, and states it is severe pain that makes it hard to stand.  She also complains of abdominal pain.    Pt states she very thirsty but is unable to keep any fluids down.  Per protocol pt is recommended to present to rapid clinic or ED.  Pt states understanding and mom offered to bring her in.  This writer will follow up as needed.  Shantelle Coley RN ....................  2018   2:58 PM          Reason for Disposition    [1] SEVERE vomiting (e.g., 6 or more times/day) AND [2] present > 8 hours    Answer Assessment - Initial Assessment Questions  1. VOMITING SEVERITY: \"How many times have you vomited in the past 24 hours?\"      - MILD:  1 - 2 times/day     - MODERATE: 3 - 5 times/day, decreased oral intake without significant weight loss or symptoms of dehydration     - SEVERE: 6 or more times/day, vomits everything or nearly everything, with significant weight loss, symptoms of dehydration       Anything close to her stomach she throws up- about twenty times  2. ONSET: \"When did the " "vomiting begin?\"       1 am this morning  3. FLUIDS: \"What fluids or food have you vomited up today?\" \"Have you been able to keep any fluids down?\"      Not able to keep any fluids or solids down  4. ABDOMINAL PAIN: \"Are your having any abdominal pain?\" If yes : \"How bad is it and what does it feel like?\" (e.g., crampy, dull, intermittent, constant)   Yes, cramping  5. DIARRHEA: \"Is there any diarrhea?\" If so, ask: \"How many times today?\"       denies  6. CONTACTS: \"Is there anyone else in the family with the same symptoms?\"       denies  7. CAUSE: \"What do you think is causing your vomiting?\"      unknown  8. HYDRATION STATUS: \"Any signs of dehydration?\" (e.g., dry mouth [not only dry lips], too weak to stand) \"When did you last urinate?\"      Can't stand because of pain  9. OTHER SYMPTOMS: \"Do you have any other symptoms?\" (e.g., fever, headache, vertigo, vomiting blood or coffee grounds)      Denies- pt state sshe is very thirsty but can't keep anythiong down,.  10. PREGNANCY: \"Is there any chance you are pregnant?\" \"When was your last menstrual period?\"        NA    Protocols used: ADULT VOMITING-A-AH            "

## 2018-02-14 NOTE — PROGRESS NOTES
Nursing Notes:   Bri Downey MA  2/13/2018  6:53 PM  Signed  Patient presents to clinic today with coughing, body aches, nasal congestion, and states that in the mornings she hears rattling in her chest. She states that she gets chills and sweats as well.  This all started x 3 weeks.  Bri Downey CMA     SUBJECTIVE:   Amanda Mckeon is a 27 year old female presenting with a chief complaint of   Chief Complaint   Patient presents with     URI   .    Onset of symptoms was 3 week(s) ago.  Course of illness is Waxing and weaning.    Severity moderate  Current and Associated symptoms: fever, chills, stuffy nose, cough - non-productive, wheezing, shortness of breath, headache, body aches and fatigue  Treatment measures tried include Tylenol/Ibuprofen, Decongestants, Fluids and Rest.  Predisposing factors include None.  She did not get a flu shot this season.   She feels the s/s have been a consistent 3 weeks, there has not been a period of time when she felt better.       ROS  See HPI    Past Medical History:   Diagnosis Date     Cyst of right ovary     11/11/2016     Herpesviral vesicular dermatitis     11/11/2016     Hypothyroidism     11/9/2015     Current Outpatient Prescriptions   Medication Sig Dispense Refill     benzonatate (TESSALON) 100 MG capsule Take 1 capsule (100 mg) by mouth 3 times daily as needed for cough 30 capsule 0     fluticasone (FLONASE) 50 MCG/ACT spray Spray 1 spray into both nostrils daily 1 Bottle 0     acyclovir (ZOVIRAX) 800 MG tablet Take 800 mg by mouth 5 times daily       levothyroxine (SYNTHROID/LEVOTHROID) 88 MCG tablet Take 88 mcg by mouth daily       norgestim-eth estrad triphasic (ORTHO TRI-CYCLEN LO) 0.18/0.215/0.25 MG-25 MCG per tablet Take 1 tablet by mouth daily       triamcinolone (NASACORT) 55 MCG/ACT Inhaler Spray 2 sprays into both nostrils daily       Social History   Substance Use Topics     Smoking status: Never Smoker     Smokeless tobacco: Never Used     Alcohol  "use No       OBJECTIVE  /90 (BP Location: Right arm, Patient Position: Sitting, Cuff Size: Adult Regular)  Pulse 104  Temp 100.8  F (38.2  C) (Tympanic)  Ht 5' 4\" (1.626 m)  LMP 01/18/2018 (Approximate)  Breastfeeding? No    Physical Exam   Constitutional: She is well-developed, well-nourished, and in no distress.   HENT:   Head: Normocephalic and atraumatic.   Right Ear: Tympanic membrane and ear canal normal.   Left Ear: Tympanic membrane and ear canal normal.   Nose: Mucosal edema and rhinorrhea present. Right sinus exhibits no maxillary sinus tenderness and no frontal sinus tenderness. Left sinus exhibits no maxillary sinus tenderness and no frontal sinus tenderness.   Mouth/Throat: Uvula is midline and mucous membranes are normal. Posterior oropharyngeal erythema present.   Nasal congestion is heard   Eyes: Conjunctivae are normal.   Neck: Neck supple.   Cardiovascular: Normal rate, regular rhythm and normal heart sounds.    Pulmonary/Chest: Effort normal and breath sounds normal. No respiratory distress. She has no decreased breath sounds. She has no wheezes. She has no rhonchi. She has no rales.   Dry cough heard   Lymphadenopathy:     She has no cervical adenopathy.   Skin: Skin is warm and dry. No rash noted.   Psychiatric: Mood and affect normal.   Nursing note and vitals reviewed.      Labs:  Results for orders placed or performed in visit on 02/13/18 (from the past 24 hour(s))   CBC with platelets and differential   Result Value Ref Range    WBC 4.5 4.0 - 11.0 10e9/L    RBC Count 4.54 3.8 - 5.2 10e12/L    Hemoglobin 13.3 11.7 - 15.7 g/dL    Hematocrit 39.2 35.0 - 47.0 %    MCV 86 78 - 100 fl    MCH 29.3 26.5 - 33.0 pg    MCHC 33.9 31.5 - 36.5 g/dL    RDW 12.4 10.0 - 15.0 %    Platelet Count 322 150 - 450 10e9/L    Diff Method Automated Method     Absolute Basophils 0.0 0.0 - 0.2 10e9/L    Absolute Eosinophils 0.0 0.0 - 0.7 10e9/L    Abs Immature Granulocytes 0.1 0 - 0.4 10e9/L    Absolute " Lymphocytes 1.4 0.8 - 5.3 10e9/L    Absolute Monocytes 0.6 0.0 - 1.3 10e9/L    Absolute Neutrophil 2.3 1.6 - 8.3 10e9/L    % Basophils 0.4 %    % Eosinophils 0.7 %    % Immature Granulocytes 2.5 %    % Lymphocytes 31.3 %    % Monocytes 13.6 %    % Neutrophils 51.5 %     Completed labs at today's visit CBC diff.  I personally reviewed the labs with the patient/parent at the visit. Abnormalities include none.     X-Ray was done, my findings are: Negative for consolidations, infiltrates.     ASSESSMENT:      ICD-10-CM    1. URI with cough and congestion J06.9 CBC with platelets and differential     XR Chest 2 Views     benzonatate (TESSALON) 100 MG capsule     fluticasone (FLONASE) 50 MCG/ACT spray        Medical Decision Making:    Differential Diagnosis:  URI Adult/Peds:  Bronchitis-viral, Influenza, Pneumonia, Sinusitis, Viral syndrome and Viral upper respiratory illness    Serious Comorbid Conditions:  Adult:  None    PLAN:    URI Adult:  Tylenol, Ibuprofen, Fluids, Rest, OTC cough suppressant/expectorant, OTC decongestant/antihistamine, Saline nasal spray and Vaporizer. We discussed that most likely she got one cold and then got another as her exam shows likely new viral illness even though she states she has been ill for 3 weeks. Will have her wait this out another week, see PCP if not improving.      Followup:    If not improving or if condition worsens, follow up with your Primary Care Provider        Grace Singleton NP, 2/13/2018 8:42 PM

## 2018-02-14 NOTE — PATIENT INSTRUCTIONS
Cold Medicines   What are cold medicines?  Symptoms of the common cold start gradually over several days and usually last about two weeks. Symptoms may include sneezing, a stuffy or runny nose, sore throat, cough, watery eyes, mild headache, or body aches. A cold will go away on its own without treatment. However, there are many nonprescription products that may help relieve some of the symptoms of a cold. Cold medicines often contain more than one ingredient and are used to treat more than one symptom. Read the labels and buy products that have only the ingredients that you need. If you are not sure which medicine is best, ask your pharmacist.  How do they work?  Decongestants reduce swelling in your nose and sinuses. They may also lessen the amount of mucus made by your nose. If you use decongestants more often than directed, your stuffy nose may get worse.   Antihistamines block the effect of histamine. Histamine is a chemical your body makes when you have an allergic reaction. Antihistamines are most often used to treat itchy or watery eyes or a stuffy or runny nose caused by an allergy. Antihistamines may not help a stuffy or runny nose caused by a cold because they can make mucus thick and dry.  Mucolytics are medicines that make mucus thinner so that it is easier to cough up out of your throat and lungs.  Expectorants are cough medicines that may help to keep the mucus thin and bring up mucus from the lungs when you cough. This may relieve chest congestion and make it easier to breathe.   Cough suppressants (antitussives) are medicines that lessen the urge to cough. They may give relief from a dry, hacking cough. If you have a cough that is wet sounding and produces mucus, it is important for you to cough the mucus up out of your lungs. For this reason, cough suppressants are not recommended for a wet sounding cough.  Fever and pain relievers, such as acetaminophen, aspirin, or other nonsteroidal  anti-inflammatory drugs (NSAIDs), are often included in cold medicine. Read labels carefully to avoid taking more medicine than you need.  What else do I need to know about this medicine?    Talk to your healthcare provider if your symptoms start suddenly or you have severe symptoms. This may mean you have something more serious than a cold.    Follow the directions that come with your medicine, including information about food or alcohol. Make sure you know how and when to take your medicine. Do not take more or less than you are supposed to take.    Try to get all of your medicine at the same place. Your pharmacist can help make sure that all of your medicines are safe to take together.    Keep a list of your medicines with you. List all of the prescription medicines, nonprescription medicines, supplements, natural remedies, and vitamins that you take. Tell all healthcare providers who treat you about all of the products you are taking.    Many medicines have side effects. A side effect is a symptom or problem that is caused by the medicine. Ask your healthcare provider or pharmacist what side effects the medicine may cause and what you should do if you have side effects.    Nonsteroidal anti-inflammatory medicines (NSAIDs), such as ibuprofen, naproxen, and aspirin, may cause stomach bleeding and other problems. These risks increase with age. Read the label and take as directed. Unless recommended by your healthcare provider, do not take for more than 10 days for any reason.    Acetaminophen may cause liver damage or other problems. Unless recommended by your provider, don't take more than 3000 milligrams (mg) in 24 hours. To make sure you don t take too much, check other medicines you take to see if they also contain acetaminophen. Ask your provider if you need to avoid drinking alcohol while taking this medicine.  If you have any questions, ask your healthcare provider or pharmacist for more information. Be sure  to keep all appointments for provider visits or tests.

## 2018-02-14 NOTE — NURSING NOTE
Patient presents to clinic today with coughing, body aches, nasal congestion, and states that in the mornings she hears rattling in her chest. She states that she gets chills and sweats as well.  This all started x 3 weeks.  Bri Downey CMA

## 2018-02-16 ENCOUNTER — OFFICE VISIT (OUTPATIENT)
Dept: FAMILY MEDICINE | Facility: OTHER | Age: 28
End: 2018-02-16
Attending: NURSE PRACTITIONER
Payer: COMMERCIAL

## 2018-02-16 VITALS
RESPIRATION RATE: 24 BRPM | SYSTOLIC BLOOD PRESSURE: 150 MMHG | HEART RATE: 92 BPM | DIASTOLIC BLOOD PRESSURE: 108 MMHG | TEMPERATURE: 99.1 F

## 2018-02-16 DIAGNOSIS — R30.0 DYSURIA: Primary | ICD-10-CM

## 2018-02-16 DIAGNOSIS — N30.01 ACUTE CYSTITIS WITH HEMATURIA: ICD-10-CM

## 2018-02-16 LAB
ALBUMIN UR-MCNC: NEGATIVE MG/DL
APPEARANCE UR: ABNORMAL
BACTERIA #/AREA URNS HPF: ABNORMAL /HPF
BILIRUB UR QL STRIP: NEGATIVE
COLOR UR AUTO: YELLOW
GLUCOSE UR STRIP-MCNC: NEGATIVE MG/DL
HGB UR QL STRIP: ABNORMAL
KETONES UR STRIP-MCNC: NEGATIVE MG/DL
LEUKOCYTE ESTERASE UR QL STRIP: ABNORMAL
NITRATE UR QL: POSITIVE
NON-SQ EPI CELLS #/AREA URNS LPF: ABNORMAL /LPF
PH UR STRIP: 6 PH (ref 5–7)
RBC #/AREA URNS AUTO: ABNORMAL /HPF
SOURCE: ABNORMAL
SP GR UR STRIP: 1.02 (ref 1–1.03)
UROBILINOGEN UR STRIP-ACNC: 0.2 EU/DL (ref 0.2–1)
WBC #/AREA URNS AUTO: ABNORMAL /HPF

## 2018-02-16 PROCEDURE — 99213 OFFICE O/P EST LOW 20 MIN: CPT | Performed by: NURSE PRACTITIONER

## 2018-02-16 PROCEDURE — 81001 URINALYSIS AUTO W/SCOPE: CPT | Performed by: NURSE PRACTITIONER

## 2018-02-16 PROCEDURE — 87086 URINE CULTURE/COLONY COUNT: CPT | Performed by: NURSE PRACTITIONER

## 2018-02-16 PROCEDURE — 87088 URINE BACTERIA CULTURE: CPT | Performed by: NURSE PRACTITIONER

## 2018-02-16 RX ORDER — NITROFURANTOIN 25; 75 MG/1; MG/1
100 CAPSULE ORAL 2 TIMES DAILY
Qty: 14 CAPSULE | Refills: 0 | Status: SHIPPED | OUTPATIENT
Start: 2018-02-16 | End: 2018-11-26

## 2018-02-16 ASSESSMENT — ENCOUNTER SYMPTOMS
FREQUENCY: 1
FEVER: 0
HEMATURIA: 0
DYSURIA: 1

## 2018-02-16 ASSESSMENT — PAIN SCALES - GENERAL: PAINLEVEL: NO PAIN (0)

## 2018-02-16 NOTE — MR AVS SNAPSHOT
After Visit Summary   2/16/2018    Amanda Mckeon    MRN: [id number]           Patient Information     Date Of Birth          1990        Visit Information        Provider Department      2/16/2018 5:30 PM Amanda Joel APRN [facility]        Today's Diagnoses     Dysuria    -  1    Acute cystitis with hematuria          Care Instructions      Urinary Tract Infections in Women    Urinary tract infections (UTIs) are most often caused by bacteria (germs). These bacteria enter the urinary tract. The bacteria may come from outside the body. Or they may travel from the skin outside the rectum or vagina into the urethra. Female anatomy makes it easy for bacteria from the bowel to enter a woman s urinary tract, which is the most common source of UTI. This means women develop UTIs more often than men. Pain in or around the urinary tract is a common UTI symptom. But the only way to know for sure if you have a UTI for the healthcare provider to test your urine. The two tests that may be done are the urinalysis and urine culture.  Types of UTIs    Cystitis: A bladder infection (cystitis) is the most common UTI in women. You may have urgent or frequent urination. You may also have pain, burning when you urinate, and bloody urine.    Urethritis: This is an inflamed urethra, which is the tube that carries urine from the bladder to outside the body. You may have lower stomach or back pain. You may also have urgent or frequent urination.    Pyelonephritis: This is a kidney infection. If not treated, it can be serious and damage your kidneys. In severe cases, you may be hospitalized. You may have a fever and lower back pain.  Medicines to treat a UTI  Most UTIs are treated with antibiotics. These kill the bacteria. The length of time you need to take them depends on the type of infection. It may be as short as 3 days. If you have repeated UTIs, a low-dose antibiotic may be  needed for several months. Take antibiotics exactly as directed. Don t stop taking them until all of the medicine is gone. If you stop taking the antibiotic too soon, the infection may not go away, and you may develop a resistance to the antibiotic. This can make it much harder to treat.  Lifestyle changes to treat and prevent UTIs  The lifestyle changes below will help get rid of your UTI. They may also help prevent future UTIs.    Drink plenty of fluids. This includes water, juice, or other caffeine-free drinks. Fluids help flush bacteria out of your body.    Empty your bladder. Always empty your bladder when you feel the urge to urinate. And always urinate before going to sleep. Urine that stays in your bladder can lead to infection. Try to urinate before and after sex as well.    Practice good personal hygiene. Wipe yourself from front to back after using the toilet. This helps keep bacteria from getting into the urethra.    Use condoms during sex. These help prevent UTIs caused by sexually transmitted bacteria. Also, avoid using spermicides during sex. These can increase the risk of UTIs. Choose other forms of birth control instead. For women who tend to get UTIs after sex, a low-dose of a preventive antibiotic may be used. Be sure to discuss this option with your healthcare provider.    Follow up with your healthcare provider as directed. He or she may test to make sure the infection has cleared. If needed, more treatment may be started.  Date Last Reviewed: 1/1/2017 2000-2017 [employer]. [address] [place] [address], [place] [address]. All rights reserved. This information is not intended as a substitute for professional medical care. Always follow your healthcare professional's instructions.                Follow-ups after your visit        Follow-up notes from your care team     Return if symptoms worsen or fail to improve.      Who to contact     If you have questions or need follow up  "information about today's clinic visit or your schedule please contact [facility] directly at [phone].  Normal or non-critical lab and imaging results will be communicated to you by [employer], letter or phone within 4 business days after the clinic has received the results. If you do not hear from us within 7 days, please contact the clinic through [employer] or phone. If you have a critical or abnormal lab result, we will notify you by phone as soon as possible.  Submit refill requests through [employer] or call your pharmacy and they will forward the refill request to us. Please allow 3 business days for your refill to be completed.          Additional Information About Your Visit        [employer] Information     [employer] lets you send messages to your doctor, view your test results, renew your prescriptions, schedule appointments and more. To sign up, go to [email or web address] . Click on \"Log in\" on the left side of the screen, which will take you to the Welcome page. Then click on \"Sign up Now\" on the right side of the page.     You will be asked to enter the access code listed below, as well as some personal information. Please follow the directions to create your username and password.     Your access code is: [email or web address]  Expires: 2018  7:36 PM     Your access code will  in 90 days. If you need help or a new code, please call your [facility] clinic or [phone].        Care EveryWhere ID     This is your Care EveryWhere ID. This could be used by other organizations to access your [facility] medical records  [id number]        Your Vitals Were     Pulse Temperature Respirations Last Period          92 99.1  F (37.3  C) (Tympanic) 24 2018 (Approximate)         Blood Pressure from Last 3 Encounters:   18 (!) 150/108   18 134/90   17 (!) 130/98    Weight from Last 3 Encounters:   16 185 lb 12.8 oz (84.3 kg)   07/13/15 200 lb (90.7 kg)   06/04/15 205 lb " 3.2 oz (93.1 kg)              We Performed the Following     *UA reflex to Microscopic     Urine Culture Aerobic Bacterial     Urine Microscopic          Today's Medication Changes          These changes are accurate as of 2/16/18  6:57 PM.  If you have any questions, ask your nurse or doctor.               Start taking these medicines.        Dose/Directions    nitroFURantoin (macrocrystal-monohydrate) 100 MG capsule   Commonly known as:  MACROBID   Used for:  Acute cystitis with hematuria   Started by:  Amanda Joel APRN CNP        Dose:  100 mg   Take 1 capsule (100 mg) by mouth 2 times daily   Quantity:  14 capsule   Refills:  0            Where to get your medicines      These medications were sent to [id number] - [place], [place] - [address] AT SEC of [facility] [address], [facility] [address]     Phone:  [phone]     nitroFURantoin (macrocrystal-monohydrate) 100 MG capsule                Primary Care Provider Office Phone # Fax #    Johnathon Fitch [phone] [address] [facility] [address]        Equal Access to Services     [facility]: Hadii aad ku hadasho Soomaali, waaxda luqadaha, qaybta kaalmada adeegyada, yulisa maciel hayumer stearns . So [facility] [phone].    ATENCIÓN: Si habla español, tiene a neves disposición servicios gratmykeos de asistencia lingüística. [facility] [phone].    We comply with applicable federal civil rights laws and [place] laws. We do not discriminate on the basis of race, color, national origin, age, disability, sex, sexual orientation, or gender identity.            Thank you!     Thank you for choosing [facility]  for your care. Our goal is always to provide you with excellent care. Hearing back from our patients is one way we can continue to improve our services. Please take a few minutes to complete the written survey that you may receive in  the mail after your visit with us. Thank you!             Your Updated Medication List - Protect others around you: Learn how to safely use, store and throw away your medicines at www.disposemymeds.org.          This list is accurate as of 2/16/18  6:57 PM.  Always use your most recent med list.                   Brand Name Dispense Instructions for use Diagnosis    acyclovir 800 MG tablet    ZOVIRAX     Take 800 mg by mouth 5 times daily        benzonatate 100 MG capsule    TESSALON    30 capsule    Take 1 capsule (100 mg) by mouth 3 times daily as needed for cough    URI with cough and congestion       fluticasone 50 MCG/ACT spray    FLONASE    1 Bottle    Spray 1 spray into both nostrils daily    URI with cough and congestion       levothyroxine 88 MCG tablet    SYNTHROID/LEVOTHROID     Take 88 mcg by mouth daily        nitroFURantoin (macrocrystal-monohydrate) 100 MG capsule    MACROBID    14 capsule    Take 1 capsule (100 mg) by mouth 2 times daily    Acute cystitis with hematuria       norgestim-eth estrad triphasic 0.18/0.215/0.25 MG-25 MCG per tablet    ORTHO TRI-CYCLEN LO     Take 1 tablet by mouth daily        triamcinolone 55 MCG/ACT Inhaler    NASACORT     Spray 2 sprays into both nostrils daily

## 2018-02-16 NOTE — NURSING NOTE
Patient presents to clinic with complaint of bladder infection. She states that she has had them before and her symptoms are the same. She describes itching, burning, and frequency of urination.   Sujit Castañeda LPN...... 5:47 PM 2/16/2018

## 2018-02-17 NOTE — PROGRESS NOTES
HPI Comments: Nursing Notes:   Sujit Castañeda LPN  2/16/2018  5:59 PM  Signed  Patient presents to clinic with complaint of bladder infection. She states   that she has had them before and her symptoms are the same. She describes   itching, burning, and frequency of urination.   Sujit Castañeda LPN...... 5:47 PM 2/16/2018    Possible bladder infection. Itching, burning, and frequency of urination that started several days ago and then went away and then worsened today. Treating with AZO which has helped. Drinking lots of water to treat also.     UTI   Pertinent negatives include no fever.         Review of Systems   Constitutional: Negative for fever.   Genitourinary: Positive for dysuria and frequency. Negative for hematuria.        Itching with urination         Physical Exam   Constitutional: She is oriented to person, place, and time and well-developed, well-nourished, and in no distress.   Cardiovascular: Normal heart sounds.    Pulmonary/Chest: Breath sounds normal.   Musculoskeletal: She exhibits no tenderness.   No CVA tenderness   Neurological: She is alert and oriented to person, place, and time.   Skin: Skin is warm and dry.   Psychiatric: Affect normal.     Assessment: On exam, well appearing female without fever, lungs clear to ausculation, no CVA tenderness  Results for orders placed or performed in visit on 02/16/18   *UA reflex to Microscopic   Result Value Ref Range    Color Urine Yellow     Appearance Urine Cloudy     Glucose Urine Negative NEG^Negative mg/dL    Bilirubin Urine Negative NEG^Negative    Ketones Urine Negative NEG^Negative mg/dL    Specific Gravity Urine 1.020 1.003 - 1.035    Blood Urine Small (A) NEG^Negative    pH Urine 6.0 5.0 - 7.0 pH    Protein Albumin Urine Negative NEG^Negative mg/dL    Urobilinogen Urine 0.2 0.2 - 1.0 EU/dL    Nitrite Urine Positive (A) NEG^Negative    Leukocyte Esterase Urine Moderate (A) NEG^Negative    Source Midstream Urine    Urine Microscopic   Result  Value Ref Range    WBC Urine  (A) OTO2^O - 2 /HPF    RBC Urine 2-5 (A) OTO2^O - 2 /HPF    Squamous Epithelial /LPF Urine Many (A) FEW^Few /LPF    Bacteria Urine Many (A) NEG^Negative /HPF   Urine sent for culture    Plan: Treat with Macrobid 100 mgs PO BID 5 days  Increase water intake  Follow up as needed

## 2018-02-17 NOTE — PATIENT INSTRUCTIONS
Urinary Tract Infections in Women    Urinary tract infections (UTIs) are most often caused by bacteria (germs). These bacteria enter the urinary tract. The bacteria may come from outside the body. Or they may travel from the skin outside the rectum or vagina into the urethra. Female anatomy makes it easy for bacteria from the bowel to enter a woman s urinary tract, which is the most common source of UTI. This means women develop UTIs more often than men. Pain in or around the urinary tract is a common UTI symptom. But the only way to know for sure if you have a UTI for the healthcare provider to test your urine. The two tests that may be done are the urinalysis and urine culture.  Types of UTIs    Cystitis: A bladder infection (cystitis) is the most common UTI in women. You may have urgent or frequent urination. You may also have pain, burning when you urinate, and bloody urine.    Urethritis: This is an inflamed urethra, which is the tube that carries urine from the bladder to outside the body. You may have lower stomach or back pain. You may also have urgent or frequent urination.    Pyelonephritis: This is a kidney infection. If not treated, it can be serious and damage your kidneys. In severe cases, you may be hospitalized. You may have a fever and lower back pain.  Medicines to treat a UTI  Most UTIs are treated with antibiotics. These kill the bacteria. The length of time you need to take them depends on the type of infection. It may be as short as 3 days. If you have repeated UTIs, a low-dose antibiotic may be needed for several months. Take antibiotics exactly as directed. Don t stop taking them until all of the medicine is gone. If you stop taking the antibiotic too soon, the infection may not go away, and you may develop a resistance to the antibiotic. This can make it much harder to treat.  Lifestyle changes to treat and prevent UTIs  The lifestyle changes below will help get rid of your UTI. They may  also help prevent future UTIs.    Drink plenty of fluids. This includes water, juice, or other caffeine-free drinks. Fluids help flush bacteria out of your body.    Empty your bladder. Always empty your bladder when you feel the urge to urinate. And always urinate before going to sleep. Urine that stays in your bladder can lead to infection. Try to urinate before and after sex as well.    Practice good personal hygiene. Wipe yourself from front to back after using the toilet. This helps keep bacteria from getting into the urethra.    Use condoms during sex. These help prevent UTIs caused by sexually transmitted bacteria. Also, avoid using spermicides during sex. These can increase the risk of UTIs. Choose other forms of birth control instead. For women who tend to get UTIs after sex, a low-dose of a preventive antibiotic may be used. Be sure to discuss this option with your healthcare provider.    Follow up with your healthcare provider as directed. He or she may test to make sure the infection has cleared. If needed, more treatment may be started.  Date Last Reviewed: 1/1/2017 2000-2017 [employer]. [address] [place] [address]. All rights reserved. This information is not intended as a substitute for professional medical care. Always follow your healthcare professional's instructions.

## 2018-02-18 LAB
BACTERIA SPEC CULT: ABNORMAL
SPECIMEN SOURCE: ABNORMAL

## 2018-02-19 ENCOUNTER — HEALTH MAINTENANCE LETTER (OUTPATIENT)
Age: 28
End: 2018-02-19

## 2018-08-10 ENCOUNTER — OFFICE VISIT (OUTPATIENT)
Dept: FAMILY MEDICINE | Facility: OTHER | Age: 28
End: 2018-08-10
Attending: FAMILY MEDICINE
Payer: COMMERCIAL

## 2018-08-10 VITALS
DIASTOLIC BLOOD PRESSURE: 80 MMHG | HEART RATE: 84 BPM | SYSTOLIC BLOOD PRESSURE: 122 MMHG | BODY MASS INDEX: 35.81 KG/M2 | WEIGHT: 208.6 LBS

## 2018-08-10 DIAGNOSIS — J06.9 URI WITH COUGH AND CONGESTION: ICD-10-CM

## 2018-08-10 DIAGNOSIS — Z30.9 ENCOUNTER FOR CONTRACEPTIVE MANAGEMENT, UNSPECIFIED TYPE: ICD-10-CM

## 2018-08-10 DIAGNOSIS — Z20.821 EXPOSURE TO ZIKA VIRUS: ICD-10-CM

## 2018-08-10 DIAGNOSIS — E03.9 HYPOTHYROIDISM, UNSPECIFIED TYPE: ICD-10-CM

## 2018-08-10 DIAGNOSIS — J30.2 CHRONIC SEASONAL ALLERGIC RHINITIS, UNSPECIFIED TRIGGER: ICD-10-CM

## 2018-08-10 DIAGNOSIS — N30.01 ACUTE CYSTITIS WITH HEMATURIA: ICD-10-CM

## 2018-08-10 DIAGNOSIS — B00.1 RECURRENT COLD SORES: Primary | ICD-10-CM

## 2018-08-10 DIAGNOSIS — Z23 NEED FOR HEPATITIS A IMMUNIZATION: ICD-10-CM

## 2018-08-10 LAB
T4 FREE SERPL-MCNC: 0.96 NG/DL (ref 0.6–1.6)
TSH SERPL DL<=0.05 MIU/L-ACNC: 2.68 IU/ML (ref 0.34–5.6)

## 2018-08-10 PROCEDURE — 96372 THER/PROPH/DIAG INJ SC/IM: CPT

## 2018-08-10 PROCEDURE — 99213 OFFICE O/P EST LOW 20 MIN: CPT | Performed by: FAMILY MEDICINE

## 2018-08-10 PROCEDURE — 84439 ASSAY OF FREE THYROXINE: CPT | Performed by: FAMILY MEDICINE

## 2018-08-10 PROCEDURE — 90632 HEPA VACCINE ADULT IM: CPT | Performed by: FAMILY MEDICINE

## 2018-08-10 PROCEDURE — 36415 COLL VENOUS BLD VENIPUNCTURE: CPT | Performed by: FAMILY MEDICINE

## 2018-08-10 PROCEDURE — 84443 ASSAY THYROID STIM HORMONE: CPT | Performed by: FAMILY MEDICINE

## 2018-08-10 PROCEDURE — 40000968 ZZHCL STATISTIC ZIKA VIRUS: Performed by: FAMILY MEDICINE

## 2018-08-10 PROCEDURE — 90471 IMMUNIZATION ADMIN: CPT | Performed by: FAMILY MEDICINE

## 2018-08-10 RX ORDER — LEVOTHYROXINE SODIUM 88 UG/1
88 TABLET ORAL DAILY
Qty: 30 TABLET | Status: CANCELLED | OUTPATIENT
Start: 2018-08-10

## 2018-08-10 RX ORDER — NORGESTIMATE AND ETHINYL ESTRADIOL 7DAYSX3 LO
1 KIT ORAL DAILY
Qty: 84 TABLET | Refills: 3 | Status: SHIPPED | OUTPATIENT
Start: 2018-08-10 | End: 2018-11-26

## 2018-08-10 RX ORDER — ACYCLOVIR 800 MG/1
800 TABLET ORAL
Qty: 50 TABLET | Refills: 3 | Status: SHIPPED | OUTPATIENT
Start: 2018-08-10 | End: 2019-09-16

## 2018-08-10 RX ORDER — TRIAMCINOLONE ACETONIDE 55 UG/1
2 SPRAY, METERED NASAL DAILY
Qty: 1 BOTTLE | Refills: 11 | Status: SHIPPED | OUTPATIENT
Start: 2018-08-10 | End: 2018-08-12

## 2018-08-10 RX ORDER — FLUTICASONE PROPIONATE 50 MCG
1 SPRAY, SUSPENSION (ML) NASAL DAILY
Qty: 1 BOTTLE | Refills: 0 | Status: CANCELLED | OUTPATIENT
Start: 2018-08-10

## 2018-08-10 RX ORDER — FOLIC ACID 1 MG/1
1 TABLET ORAL DAILY
Qty: 100 TABLET | Refills: 3 | Status: SHIPPED | OUTPATIENT
Start: 2018-08-10 | End: 2019-09-16

## 2018-08-10 ASSESSMENT — ANXIETY QUESTIONNAIRES
IF YOU CHECKED OFF ANY PROBLEMS ON THIS QUESTIONNAIRE, HOW DIFFICULT HAVE THESE PROBLEMS MADE IT FOR YOU TO DO YOUR WORK, TAKE CARE OF THINGS AT HOME, OR GET ALONG WITH OTHER PEOPLE: NOT DIFFICULT AT ALL
GAD7 TOTAL SCORE: 0
4. TROUBLE RELAXING: NOT AT ALL
3. WORRYING TOO MUCH ABOUT DIFFERENT THINGS: NOT AT ALL
5. BEING SO RESTLESS THAT IT IS HARD TO SIT STILL: NOT AT ALL
6. BECOMING EASILY ANNOYED OR IRRITABLE: NOT AT ALL
7. FEELING AFRAID AS IF SOMETHING AWFUL MIGHT HAPPEN: NOT AT ALL
2. NOT BEING ABLE TO STOP OR CONTROL WORRYING: NOT AT ALL
1. FEELING NERVOUS, ANXIOUS, OR ON EDGE: NOT AT ALL

## 2018-08-10 ASSESSMENT — PAIN SCALES - GENERAL: PAINLEVEL: NO PAIN (0)

## 2018-08-10 NOTE — LETTER
August 28, 2018      Amanda Mckeon  [address] [place] [address]        Dear MsLuke,    I received the report  from the [place] Department of Health regarding Zika virus testing.  The report indicates that testing is unreliable at this late of a date, after exposure which was quite a few months ago.  The report indicates that the risk of having Zika virus is extremely low.  Therefore, because of unreliability, the test was not done, but the report indicates that the chances of having any problem from Zika virus, without any symptoms at this point, is very close to 0.    I do not see any problem but all with proceeding with an attempt at pregnancy whenever you wish, and you had indicated possibly this fall or winter.    Resulted Orders   Thyrotropin GH   Result Value Ref Range    Thyrotropin 2.68 0.34 - 5.60 IU/mL   T4 free   Result Value Ref Range    T4 Free 0.96 0.60 - 1.60 ng/dL   Zika Virus   Result Value Ref Range    Lab Scanned Result ZIKA VIRUS-Scanned        If you have any questions or concerns, please call the clinic at the number listed above.       Sincerely,        YESENIA GARDNER MD

## 2018-08-10 NOTE — PROGRESS NOTES
SUBJECTIVE:  27 year old female who presents for follow-up thyroid test.  She also needs some medications refilled.    She and her  are thinking of the pregnancy this fall.  She did become ill  after being in [place] last winter, and is concerned about Zika.  She is feeling fine now.  She would like to be tested to see if she has been exposed or has a virus.  She has no symptoms.    She has seasonal allergies that are well controlled with Nasacort.  From the thyroid standpoint she feels fine.  She is on 88 mcg.  She also intends to continue with her birth control pills until she finds out her lab results for the Zika virus.    She also would like to get her hepatitis A booster.    She presently is taking an OTC prenatal vitamin.    Additional Review of Systems: See HPI: No new symptoms otherwise    Past Medical History:   Diagnosis Date     Cyst of right ovary     11/11/2016     Herpesviral vesicular dermatitis     11/11/2016     Hypothyroidism     11/9/2015        Current Outpatient Prescriptions   Medication Sig Dispense Refill     acyclovir (ZOVIRAX) 800 MG tablet Take 1 tablet (800 mg) by mouth 5 times daily 50 tablet 3     fluticasone (FLONASE) 50 MCG/ACT spray Spray 1 spray into both nostrils daily 1 Bottle 0     folic acid (FOLVITE) 1 MG tablet Take 1 tablet (1 mg) by mouth daily 100 tablet 3     levothyroxine (SYNTHROID/LEVOTHROID) 88 MCG tablet Take 88 mcg by mouth daily       nitroFURantoin, macrocrystal-monohydrate, (MACROBID) 100 MG capsule Take 1 capsule (100 mg) by mouth 2 times daily 14 capsule 0     norgestim-eth estrad triphasic (ORTHO TRI-CYCLEN LO) 0.18/0.215/0.25 MG-25 MCG per tablet Take 1 tablet by mouth daily 84 tablet 3     triamcinolone (NASACORT) 55 MCG/ACT inhaler Spray 2 sprays into both nostrils daily 1 Bottle 11     [DISCONTINUED] acyclovir (ZOVIRAX) 800 MG tablet Take 800 mg by mouth 5 times daily       [DISCONTINUED] norgestim-eth estrad triphasic (ORTHO TRI-CYCLEN LO)  0.18/0.215/0.25 MG-25 MCG per tablet Take 1 tablet by mouth daily         Allergies as of 08/10/2018 - Kian as Reviewed 08/10/2018   Allergen Reaction Noted     Amoxicillin Hives 05/27/2014        OBJECTIVE:  /80 (BP Location: Right arm, Patient Position: Sitting, Cuff Size: Adult Large)  Pulse 84  Wt 208 lb 9.6 oz (94.6 kg)  BMI 35.81 kg/m2  EXAM: {EXAM -  General: She is pleasant and cooperative, healthy-appearing, overweight with a BMI of 35, but in no acute distress.  Blood pressure is 122/80  Neuro/psych: She is neurologically intact.  She is alert and oriented with normal mood and affect.    Labs/imaging: Thyroid functions and Zika virus testing are pending      ASSESSMENT/PLAN:  Hypothyroidism.  Will check her thyroid functions today and perhaps consider increasing her dose pending these results.    History of recurrent cold sores-refilled acyclovir.    Seasonal allergies with nasal congestion-refilled Nasacort    Contraception-contemplating pregnancy.  We will obtain Zika virus testing today, discussed diet and activities.  Recommended she start folic acid 1 mg daily.  I will notify her as soon as lab results are available.  She intends to stay on birth control pills until she finds out her test results.  YESENIA GARDNER MD on 8/10/2018 at 4:29 PM

## 2018-08-10 NOTE — MR AVS SNAPSHOT
"              After Visit Summary   8/10/2018    Amanda Mckeon    MRN: [id number]           Patient Information     Date Of Birth          1990        Visit Information        Provider Department      8/10/2018 3:45 PM Johnathon Fitch MD [facility]        Today's Diagnoses     Recurrent cold sores    -  1    Acute cystitis with hematuria        URI with cough and congestion        Encounter for contraceptive management, unspecified type        Chronic seasonal allergic rhinitis, unspecified trigger        Need for hepatitis A immunization        Hypothyroidism, unspecified type        Exposure to Zika virus           Follow-ups after your visit        Future tests that were ordered for you today     Open Future Orders        Priority Expected Expires Ordered    Thyrotropin GH Routine  8/11/2019 8/10/2018    T4 free Routine  8/11/2019 8/10/2018    Zika Virus Routine  8/10/2019 8/10/2018            Who to contact     If you have questions or need follow up information about today's clinic visit or your schedule please contact [facility] directly at [phone].  Normal or non-critical lab and imaging results will be communicated to you by WeVideohart, letter or phone within 4 business days after the clinic has received the results. If you do not hear from us within 7 days, please contact the clinic through Seakeepert or phone. If you have a critical or abnormal lab result, we will notify you by phone as soon as possible.  Submit refill requests through FunCaptcha or call your pharmacy and they will forward the refill request to us. Please allow 3 business days for your refill to be completed.          Additional Information About Your Visit        WeVideohart Information     FunCaptcha lets you send messages to your doctor, view your test results, renew your prescriptions, schedule appointments and more. To sign up, go to [email or web address] . Click on \"Log in\" on the left " "side of the screen, which will take you to the Welcome page. Then click on \"Sign up Now\" on the right side of the page.     You will be asked to enter the access code listed below, as well as some personal information. Please follow the directions to create your username and password.     Your access code is: [email or web address]  Expires: 2018  4:29 PM     Your access code will  in 90 days. If you need help or a new code, please call your [facility] clinic or [phone].        Care EveryWhere ID     This is your Care EveryWhere ID. This could be used by other organizations to access your [facility] medical records  [id number]        Your Vitals Were     Pulse BMI (Body Mass Index)                84 35.81 kg/m2           Blood Pressure from Last 3 Encounters:   08/10/18 122/80   18 (!) 150/108   18 134/90    Weight from Last 3 Encounters:   08/10/18 208 lb 9.6 oz (94.6 kg)   16 185 lb 12.8 oz (84.3 kg)   07/13/15 200 lb (90.7 kg)              We Performed the Following     GH IMM-  HEPATITIS A VACCINE (ADULT)          Today's Medication Changes          These changes are accurate as of 8/10/18  4:29 PM.  If you have any questions, ask your nurse or doctor.               Start taking these medicines.        Dose/Directions    folic acid 1 MG tablet   Commonly known as:  FOLVITE   Used for:  Encounter for contraceptive management, unspecified type   Started by:  Johnathon Fitch MD        Dose:  1 mg   Take 1 tablet (1 mg) by mouth daily   Quantity:  100 tablet   Refills:  3         Stop taking these medicines if you haven't already. Please contact your care team if you have questions.     fluticasone 50 MCG/ACT spray   Commonly known as:  FLONASE   Stopped by:  Johnathon Fitch MD                Where to get your medicines      These medications were sent to [id number] - [place], [place] -  AT [address] , [place] " [address]     Phone:  [phone]     acyclovir 800 MG tablet    folic acid 1 MG tablet    norgestim-eth estrad triphasic 0.18/0.215/0.25 MG-25 MCG per tablet    triamcinolone 55 MCG/ACT inhaler                Primary Care Provider Office Phone # Fax #    Johnathon Fitch [phone] [address]  [facility] [address]        Equal Access to Services     [facility] : Hadii aad ku hadasho Soomaali, waaxda luqadaha, qaybta kaalmada adeegyada, waxay idiin hayaan adeeg huyenbellamichelle laarturo . So [facility] [phone].    ATENCIÓN: Si satishla espblaine, tiene a neves disposición servicios gratuitos de asistencia lingüística. [facility] [phone].    We comply with applicable federal civil rights laws and [place] laws. We do not discriminate on the basis of race, color, national origin, age, disability, sex, sexual orientation, or gender identity.            Thank you!     Thank you for choosing [facility] AND [facility]  for your care. Our goal is always to provide you with excellent care. Hearing back from our patients is one way we can continue to improve our services. Please take a few minutes to complete the written survey that you may receive in the mail after your visit with us. Thank you!             Your Updated Medication List - Protect others around you: Learn how to safely use, store and throw away your medicines at www.disposemymeds.org.          This list is accurate as of 8/10/18  4:29 PM.  Always use your most recent med list.                   Brand Name Dispense Instructions for use Diagnosis    acyclovir 800 MG tablet    ZOVIRAX    50 tablet    Take 1 tablet (800 mg) by mouth 5 times daily    Recurrent cold sores       folic acid 1 MG tablet    FOLVITE    100 tablet    Take 1 tablet (1 mg) by mouth daily    Encounter for contraceptive management, unspecified type       levothyroxine 88 MCG tablet    SYNTHROID/LEVOTHROID     Take 88 mcg by mouth daily         nitroFURantoin (macrocrystal-monohydrate) 100 MG capsule    MACROBID    14 capsule    Take 1 capsule (100 mg) by mouth 2 times daily    Acute cystitis with hematuria       norgestim-eth estrad triphasic 0.18/0.215/0.25 MG-25 MCG per tablet    ORTHO TRI-CYCLEN LO    84 tablet    Take 1 tablet by mouth daily    Encounter for contraceptive management, unspecified type       triamcinolone 55 MCG/ACT inhaler    NASACORT    1 Bottle    Spray 2 sprays into both nostrils daily    Chronic seasonal allergic rhinitis, unspecified trigger

## 2018-08-11 ASSESSMENT — PATIENT HEALTH QUESTIONNAIRE - PHQ9: SUM OF ALL RESPONSES TO PHQ QUESTIONS 1-9: 0

## 2018-08-11 ASSESSMENT — ANXIETY QUESTIONNAIRES: GAD7 TOTAL SCORE: 0

## 2018-08-12 RX ORDER — LEVOTHYROXINE SODIUM 88 UG/1
88 TABLET ORAL DAILY
Qty: 90 TABLET | Refills: 3 | Status: SHIPPED | OUTPATIENT
Start: 2018-08-12 | End: 2018-12-21

## 2018-08-12 RX ORDER — FLUTICASONE PROPIONATE 50 MCG
2 SPRAY, SUSPENSION (ML) NASAL DAILY
Qty: 1 BOTTLE | Refills: 11 | Status: SHIPPED | OUTPATIENT
Start: 2018-08-12 | End: 2021-01-21

## 2018-08-27 LAB — LAB SCANNED RESULT: NORMAL

## 2018-08-28 ENCOUNTER — TELEPHONE (OUTPATIENT)
Dept: FAMILY MEDICINE | Facility: OTHER | Age: 28
End: 2018-08-28

## 2018-08-28 NOTE — TELEPHONE ENCOUNTER
Left message to call back....................  8/28/2018   11:29 AM  Yoli Cevallos LPN...................8/28/2018  11:29 AM

## 2018-09-04 ENCOUNTER — TELEPHONE (OUTPATIENT)
Dept: FAMILY MEDICINE | Facility: OTHER | Age: 28
End: 2018-09-04

## 2018-09-04 NOTE — TELEPHONE ENCOUNTER
Spoke to Amanda. She is very confused as to why the health department would not run there test for them. You told her to wait 3 weeks. She did this. She told you of all her symptoms. Why wont they run the test?   Amanda wants YOU (Dr. Fitch) to call and talk about this. They wouldn't run her husbands either. She wants some answers.  Bri Downey CMA..............9/4/2018........2:17 PM

## 2018-09-04 NOTE — TELEPHONE ENCOUNTER
I do not know any answer other than what I said in the letter.  We were told by the health department that since there are no symptoms and that it is this late after exposure that there is no indication to run the test.  I do not know what else I can do.  She could try calling [facility] in [place], to their travel clinic, and see if they have any answers.  YESENIA GARDNER MD on 9/4/2018 at 2:39 PM

## 2018-11-26 ENCOUNTER — OFFICE VISIT (OUTPATIENT)
Dept: FAMILY MEDICINE | Facility: OTHER | Age: 28
End: 2018-11-26
Attending: FAMILY MEDICINE
Payer: COMMERCIAL

## 2018-11-26 VITALS
HEART RATE: 104 BPM | WEIGHT: 216.2 LBS | BODY MASS INDEX: 37.11 KG/M2 | DIASTOLIC BLOOD PRESSURE: 86 MMHG | RESPIRATION RATE: 18 BRPM | SYSTOLIC BLOOD PRESSURE: 128 MMHG

## 2018-11-26 DIAGNOSIS — N92.6 MISSED PERIOD: Primary | ICD-10-CM

## 2018-11-26 LAB — HCG UR QL: POSITIVE

## 2018-11-26 PROCEDURE — 81025 URINE PREGNANCY TEST: CPT | Performed by: FAMILY MEDICINE

## 2018-11-26 PROCEDURE — 99213 OFFICE O/P EST LOW 20 MIN: CPT | Performed by: FAMILY MEDICINE

## 2018-11-26 ASSESSMENT — ANXIETY QUESTIONNAIRES
IF YOU CHECKED OFF ANY PROBLEMS ON THIS QUESTIONNAIRE, HOW DIFFICULT HAVE THESE PROBLEMS MADE IT FOR YOU TO DO YOUR WORK, TAKE CARE OF THINGS AT HOME, OR GET ALONG WITH OTHER PEOPLE: NOT DIFFICULT AT ALL
GAD7 TOTAL SCORE: 0
7. FEELING AFRAID AS IF SOMETHING AWFUL MIGHT HAPPEN: NOT AT ALL
6. BECOMING EASILY ANNOYED OR IRRITABLE: NOT AT ALL
3. WORRYING TOO MUCH ABOUT DIFFERENT THINGS: NOT AT ALL
1. FEELING NERVOUS, ANXIOUS, OR ON EDGE: NOT AT ALL
5. BEING SO RESTLESS THAT IT IS HARD TO SIT STILL: NOT AT ALL
2. NOT BEING ABLE TO STOP OR CONTROL WORRYING: NOT AT ALL

## 2018-11-26 ASSESSMENT — PATIENT HEALTH QUESTIONNAIRE - PHQ9
SUM OF ALL RESPONSES TO PHQ QUESTIONS 1-9: 0
5. POOR APPETITE OR OVEREATING: NOT AT ALL

## 2018-11-26 ASSESSMENT — PAIN SCALES - GENERAL: PAINLEVEL: NO PAIN (0)

## 2018-11-26 NOTE — NURSING NOTE
"Chief Complaint   Patient presents with     Confirmation Of Pregnancy     Confirm pregnancy       Initial /86 (BP Location: Right arm, Patient Position: Sitting, Cuff Size: Adult Large)  Pulse 104  Resp 18  Wt 216 lb 3.2 oz (98.1 kg)  LMP 10/31/2018 (Exact Date)  Breastfeeding? No  BMI 37.11 kg/m2 Estimated body mass index is 37.11 kg/(m^2) as calculated from the following:    Height as of 2/13/18: 5' 4\" (1.626 m).    Weight as of this encounter: 216 lb 3.2 oz (98.1 kg).  Medication Reconciliation: complete    John Donovan, MARTHA    "

## 2018-11-26 NOTE — MR AVS SNAPSHOT
After Visit Summary   11/26/2018    Amanda Mckeon    MRN: [id number]           Patient Information     Date Of Birth          1990        Visit Information        Provider Department      11/26/2018 3:00 PM Johnathon Fitch MD [facility]        Today's Diagnoses     Missed period    -  1       Follow-ups after your visit        Your next 10 appointments already scheduled     Dec 21, 2018 10:30 AM CST   New Prenatal with Liza Ordoñez MD   [facility])    [address]  [place] [id number] [phone]              Future tests that were ordered for you today     Open Future Orders        Priority Expected Expires Ordered    US OB < 14 Weeks Single Routine  11/27/2019 11/27/2018            Who to contact     If you have questions or need follow up information about today's clinic visit or your schedule please contact [facility] directly at [phone].  Normal or non-critical lab and imaging results will be communicated to you by Case Roverhart, letter or phone within 4 business days after the clinic has received the results. If you do not hear from us within 7 days, please contact the clinic through So1t or phone. If you have a critical or abnormal lab result, we will notify you by phone as soon as possible.  Submit refill requests through iPAYst or call your pharmacy and they will forward the refill request to us. Please allow 3 business days for your refill to be completed.          Additional Information About Your Visit        Case Roverhart Information     iPAYst gives you secure access to your electronic health record. If you see a primary care provider, you can also send messages to your care team and make appointments. If you have questions, please call your primary care clinic.  If you do not have a primary care provider, please call [phone] and they will assist  you.        Care EveryWhere ID     This is your Care EveryWhere ID. This could be used by other organizations to access your [facility] medical records  [id number]        Your Vitals Were     Pulse Respirations Last Period Breastfeeding? BMI (Body Mass Index)       104 18 10/31/2018 (Exact Date) No 37.11 kg/m2        Blood Pressure from Last 3 Encounters:   11/26/18 128/86   08/10/18 122/80   02/16/18 (!) 150/108    Weight from Last 3 Encounters:   11/26/18 216 lb 3.2 oz (98.1 kg)   08/10/18 208 lb 9.6 oz (94.6 kg)   11/11/16 185 lb 12.8 oz (84.3 kg)              We Performed the Following     Pregnancy, Urine (HCG)        Primary Care Provider Office Phone # Fax #    Johnathon Fitch [phone]       [address] [facility] [address]        Equal Access to Services     [facility] : Hadii lizzy blacko Somiguel, waaxda luqadaha, qaybta kaalmada aderylieyachi, yulisa stearns . So [facility] [phone].    ATENCIÓN: Si habla español, tiene a neves disposición servicios gratuitos de asistencia lingüística. Llame al [phone].    We comply with applicable federal civil rights laws and [place] laws. We do not discriminate on the basis of race, color, national origin, age, disability, sex, sexual orientation, or gender identity.            Thank you!     Thank you for choosing [facility] AND [facility]  for your care. Our goal is always to provide you with excellent care. Hearing back from our patients is one way we can continue to improve our services. Please take a few minutes to complete the written survey that you may receive in the mail after your visit with us. Thank you!             Your Updated Medication List - Protect others around you: Learn how to safely use, store and throw away your medicines at www.disposemymeds.org.          This list is accurate as of 11/26/18 11:59 PM.  Always use your most recent med list.                   Brand Name  Dispense Instructions for use Diagnosis    acyclovir 800 MG tablet    ZOVIRAX    50 tablet    Take 1 tablet (800 mg) by mouth 5 times daily    Recurrent cold sores       fluticasone 50 MCG/ACT nasal spray    FLONASE    1 Bottle    Spray 2 sprays into both nostrils daily    URI with cough and congestion       folic acid 1 MG tablet    FOLVITE    100 tablet    Take 1 tablet (1 mg) by mouth daily    Encounter for contraceptive management, unspecified type       levothyroxine 88 MCG tablet    SYNTHROID/LEVOTHROID    90 tablet    Take 1 tablet (88 mcg) by mouth daily    Hypothyroidism, unspecified type

## 2018-11-26 NOTE — NURSING NOTE
Amanda presents to the clinic today for concerns of confirmation of pregnancy.          John Donovan, LPN 11/26/18 3:16 PM

## 2018-11-27 DIAGNOSIS — Z34.90 PREGNANCY, UNSPECIFIED GESTATIONAL AGE: Primary | ICD-10-CM

## 2018-11-27 ASSESSMENT — ANXIETY QUESTIONNAIRES: GAD7 TOTAL SCORE: 0

## 2018-11-27 NOTE — PROGRESS NOTES
SUBJECTIVE:  28 year old female who presents for possible early pregnancy.  She states that she should have had a menstrual period starting yesterday, based on about a 25-day cycle, but the.  Did not come.  She and her  have been trying to become pregnant, and she did a home pregnancy test which was positive.    She was on oral contraceptives until August when she stopped.  She has had 2 menstrual periods since then, the last one which started 30 October.  She is feeling fine.  She has had no pregnancies previously.  She has no symptoms of any sort.    She was in [place] endemic area for [place] virus last winter, but had no known infection.    Other than hypothyroidism, she is in good general health with no new problems or concerns.  She has been taking prenatal vitamins and folic acid in anticipation of possible pregnancy.  Her  accompanies her today.  They are both quite excited.    Additional Review of Systems: See HPI: No symptoms or problems of any sort    Past Medical History:   Diagnosis Date     Cyst of right ovary     11/11/2016     Herpesviral vesicular dermatitis     11/11/2016     Hypothyroidism     11/9/2015        Current Outpatient Prescriptions   Medication Sig Dispense Refill     acyclovir (ZOVIRAX) 800 MG tablet Take 1 tablet (800 mg) by mouth 5 times daily 50 tablet 3     fluticasone (FLONASE) 50 MCG/ACT spray Spray 2 sprays into both nostrils daily 1 Bottle 11     folic acid (FOLVITE) 1 MG tablet Take 1 tablet (1 mg) by mouth daily 100 tablet 3     levothyroxine (SYNTHROID/LEVOTHROID) 88 MCG tablet Take 1 tablet (88 mcg) by mouth daily 90 tablet 3       Allergies as of 11/26/2018 - Kian as Reviewed 11/26/2018   Allergen Reaction Noted     Amoxicillin Hives 05/27/2014        OBJECTIVE:  /86 (BP Location: Right arm, Patient Position: Sitting, Cuff Size: Adult Large)  Pulse 104  Resp 18  Wt 216 lb 3.2 oz (98.1 kg)  LMP 10/31/2018 (Exact Date)  Breastfeeding? No  BMI 37.11  kg/m2  EXAM: {EXAM -  General: She is pleasant, healthy-appearing, and in no apparent distress.  Blood pressure was 128/86.    Labs/imaging: Pregnancy test is positive.  Thyroid test done in August of this year were normal.  No further lab was done today, nor was an ultrasound scheduled.  This will be done when she has her first OB visit.      ASSESSMENT/PLAN:  Pregnancy-likely intrauterine.  Based on her last menstrual period of October 30, her EDC would be August 6, 2019.  She, her , and I had a long discussion regarding do's and don'ts of pregnancy, things to avoid, activity, intercourse, and all their questions were answered.  She was encouraged to continue with her prenatal vitamins.  She was given a list of the physicians doing OB, and will make a decision as to whom she would like to see.  She is advised to continue on her same thyroid medication.  She and her  are both quite excited about the prospect of having a baby.    20 minutes were spent with this couple, almost all of it in counseling.  YESENIA GARDNER MD on 11/27/2018 at 11:52 AM

## 2018-12-21 ENCOUNTER — PRENATAL OFFICE VISIT (OUTPATIENT)
Dept: FAMILY MEDICINE | Facility: OTHER | Age: 28
End: 2018-12-21
Attending: FAMILY MEDICINE
Payer: COMMERCIAL

## 2018-12-21 ENCOUNTER — HOSPITAL ENCOUNTER (OUTPATIENT)
Dept: ULTRASOUND IMAGING | Facility: OTHER | Age: 28
Discharge: HOME | End: 2018-12-21
Attending: FAMILY MEDICINE | Admitting: FAMILY MEDICINE
Payer: COMMERCIAL

## 2018-12-21 VITALS
BODY MASS INDEX: 36.85 KG/M2 | SYSTOLIC BLOOD PRESSURE: 132 MMHG | HEART RATE: 68 BPM | WEIGHT: 214.7 LBS | DIASTOLIC BLOOD PRESSURE: 82 MMHG

## 2018-12-21 DIAGNOSIS — E03.9 HYPOTHYROIDISM, UNSPECIFIED TYPE: ICD-10-CM

## 2018-12-21 DIAGNOSIS — Z34.90 PREGNANCY, UNSPECIFIED GESTATIONAL AGE: Primary | ICD-10-CM

## 2018-12-21 DIAGNOSIS — Z34.90 PREGNANCY, UNSPECIFIED GESTATIONAL AGE: ICD-10-CM

## 2018-12-21 LAB
ABO + RH BLD: NORMAL
ABO + RH BLD: NORMAL
ALBUMIN UR-MCNC: NEGATIVE MG/DL
APPEARANCE UR: CLEAR
BASOPHILS # BLD AUTO: 0.1 10E9/L (ref 0–0.2)
BASOPHILS NFR BLD AUTO: 0.5 %
BILIRUB UR QL STRIP: NEGATIVE
C TRACH DNA SPEC QL PROBE+SIG AMP: NOT DETECTED
COLOR UR AUTO: YELLOW
DIFFERENTIAL METHOD BLD: ABNORMAL
EOSINOPHIL # BLD AUTO: 0.1 10E9/L (ref 0–0.7)
EOSINOPHIL NFR BLD AUTO: 0.9 %
ERYTHROCYTE [DISTWIDTH] IN BLOOD BY AUTOMATED COUNT: 11.8 % (ref 10–15)
GLUCOSE UR STRIP-MCNC: NEGATIVE MG/DL
HCT VFR BLD AUTO: 37.2 % (ref 35–47)
HGB BLD-MCNC: 12.6 G/DL (ref 11.7–15.7)
HGB UR QL STRIP: NEGATIVE
IMM GRANULOCYTES # BLD: 0.2 10E9/L (ref 0–0.4)
IMM GRANULOCYTES NFR BLD: 1.3 %
KETONES UR STRIP-MCNC: NEGATIVE MG/DL
LEUKOCYTE ESTERASE UR QL STRIP: NEGATIVE
LYMPHOCYTES # BLD AUTO: 2.8 10E9/L (ref 0.8–5.3)
LYMPHOCYTES NFR BLD AUTO: 19.7 %
MCH RBC QN AUTO: 29.2 PG (ref 26.5–33)
MCHC RBC AUTO-ENTMCNC: 33.9 G/DL (ref 31.5–36.5)
MCV RBC AUTO: 86 FL (ref 78–100)
MISCELLANEOUS TEST: NORMAL
MONOCYTES # BLD AUTO: 1 10E9/L (ref 0–1.3)
MONOCYTES NFR BLD AUTO: 7 %
N GONORRHOEA DNA SPEC QL PROBE+SIG AMP: NOT DETECTED
NEUTROPHILS # BLD AUTO: 10.1 10E9/L (ref 1.6–8.3)
NEUTROPHILS NFR BLD AUTO: 70.6 %
NITRATE UR QL: NEGATIVE
PH UR STRIP: 5 PH (ref 5–9)
PLATELET # BLD AUTO: 374 10E9/L (ref 150–450)
RBC # BLD AUTO: 4.31 10E12/L (ref 3.8–5.2)
SOURCE: NORMAL
SP GR UR STRIP: <1.005 (ref 1–1.03)
SPECIMEN EXP DATE BLD: NORMAL
SPECIMEN SOURCE: NORMAL
SPECIMEN SOURCE: NORMAL
TSH SERPL DL<=0.05 MIU/L-ACNC: 5.07 IU/ML (ref 0.34–5.6)
UROBILINOGEN UR STRIP-ACNC: 0.2 EU/DL (ref 0.2–1)
WBC # BLD AUTO: 14.3 10E9/L (ref 4–11)
WET PREP SPEC: NORMAL

## 2018-12-21 PROCEDURE — 81400 MOPATH PROCEDURE LEVEL 1: CPT

## 2018-12-21 PROCEDURE — 86900 BLOOD TYPING SEROLOGIC ABO: CPT | Performed by: FAMILY MEDICINE

## 2018-12-21 PROCEDURE — 87389 HIV-1 AG W/HIV-1&-2 AB AG IA: CPT | Performed by: FAMILY MEDICINE

## 2018-12-21 PROCEDURE — 87210 SMEAR WET MOUNT SALINE/INK: CPT | Performed by: FAMILY MEDICINE

## 2018-12-21 PROCEDURE — 36415 COLL VENOUS BLD VENIPUNCTURE: CPT | Performed by: FAMILY MEDICINE

## 2018-12-21 PROCEDURE — 84999 UNLISTED CHEMISTRY PROCEDURE: CPT | Performed by: FAMILY MEDICINE

## 2018-12-21 PROCEDURE — 86780 TREPONEMA PALLIDUM: CPT | Performed by: FAMILY MEDICINE

## 2018-12-21 PROCEDURE — 87591 N.GONORRHOEAE DNA AMP PROB: CPT | Performed by: FAMILY MEDICINE

## 2018-12-21 PROCEDURE — 84999 UNLISTED CHEMISTRY PROCEDURE: CPT

## 2018-12-21 PROCEDURE — 86901 BLOOD TYPING SEROLOGIC RH(D): CPT | Performed by: FAMILY MEDICINE

## 2018-12-21 PROCEDURE — 87491 CHLMYD TRACH DNA AMP PROBE: CPT | Performed by: FAMILY MEDICINE

## 2018-12-21 PROCEDURE — 85025 COMPLETE CBC W/AUTO DIFF WBC: CPT | Performed by: FAMILY MEDICINE

## 2018-12-21 PROCEDURE — 87340 HEPATITIS B SURFACE AG IA: CPT | Performed by: FAMILY MEDICINE

## 2018-12-21 PROCEDURE — 81220 CFTR GENE COM VARIANTS: CPT | Performed by: FAMILY MEDICINE

## 2018-12-21 PROCEDURE — 81003 URINALYSIS AUTO W/O SCOPE: CPT | Performed by: FAMILY MEDICINE

## 2018-12-21 PROCEDURE — 86762 RUBELLA ANTIBODY: CPT | Performed by: FAMILY MEDICINE

## 2018-12-21 PROCEDURE — 84443 ASSAY THYROID STIM HORMONE: CPT | Performed by: FAMILY MEDICINE

## 2018-12-21 PROCEDURE — 99207 ZZC OB VISIT-NO CHARGE - GICH ONLY: CPT | Performed by: FAMILY MEDICINE

## 2018-12-21 PROCEDURE — 81329 SMN1 GENE DOS/DELETION ALYS: CPT | Performed by: FAMILY MEDICINE

## 2018-12-21 PROCEDURE — 76801 OB US < 14 WKS SINGLE FETUS: CPT

## 2018-12-21 RX ORDER — LEVOTHYROXINE SODIUM 100 UG/1
100 TABLET ORAL DAILY
Qty: 30 TABLET | Refills: 11 | Status: SHIPPED | OUTPATIENT
Start: 2018-12-21 | End: 2019-09-16

## 2018-12-21 ASSESSMENT — PAIN SCALES - GENERAL: PAINLEVEL: NO PAIN (0)

## 2018-12-21 NOTE — PROGRESS NOTES
New Obstetrics Visit    HPI: 28 year old  at 7w2d by last menstrual period, confirmed with ultrasound here today for initial OB visit. Her LMP was 10/31/18. This was a planned pregnancy.    OBHx  Obstetric History       T0      L0     SAB0   TAB0   Ectopic0   Multiple0   Live Births0       # Outcome Date GA Lbr Rob/2nd Weight Sex Delivery Anes PTL Lv   1 Current                   GYN History  - Menses: Patient's last menstrual period was 10/31/2018 (exact date).. Regular.  Heavier periods.  - Pap Smears: Last on 17 - normal.   No results found for: PAP  - Sexual Activity/Concerns: none  - Hx STIs/UTIs: has had urinary tract infections in past.      PMHx:   Past Medical History:   Diagnosis Date     Cyst of right ovary     2016     Herpesviral vesicular dermatitis     2016     Hypothyroidism     2015      PSHx:   Past Surgical History:   Procedure Laterality Date     ADENOIDECTOMY      as a child     AS SHOULDER ARTHROSCOPY, DX Right     Dr. Bolivar - age 12     MAMMOPLASTY REDUCTION           PE TUBES      as a child      Meds:   Current Outpatient Medications   Medication     acyclovir (ZOVIRAX) 800 MG tablet     fluticasone (FLONASE) 50 MCG/ACT spray     folic acid (FOLVITE) 1 MG tablet     levothyroxine (SYNTHROID/LEVOTHROID) 100 MCG tablet     No current facility-administered medications for this visit.      Allergies:     Allergies   Allergen Reactions     Amoxicillin Hives     Childhood       SocHx:   Social History     Tobacco Use     Smoking status: Never Smoker     Smokeless tobacco: Never Used   Substance Use Topics     Alcohol use: No     Drug use: No     Comment: Drug use: No       FamHx:   Family History   Problem Relation Age of Onset     Family History Negative Mother      Lupus Father      Family History Negative Brother      Kidney Disease Maternal Grandmother      Bladder Cancer Maternal Grandfather      Family History Negative Paternal Grandmother       Diabetes Paternal Grandfather         Review of Systems      Physical Exam  /82 (BP Location: Right arm, Patient Position: Sitting, Cuff Size: Adult Regular)   Pulse 68   Wt 97.4 kg (214 lb 11.2 oz)   LMP 10/31/2018 (Exact Date)   BMI 36.85 kg/m    Body mass index is 36.85 kg/m .  Gen: Well-appearing, no apparent distress.  HEENT: Normocephalic, atraumatic.  Pupils equally round and reactive to light and accomodation.  Oropharynx clear.  TMs clear bilaterally.  Neck: Thyroid is not enlarged, no appreciable masses palpated. Non-tender.  Breast: Symmetric, no nipple discharge or retraction, no axillary lymphadenopathy, no palpable nodules or masses bilaterally.  CV:  Regular rate and rhythm.  No murmur, gallop or rub.  Normal S1, S2.  Lungs: Clear to auscultation bilaterally.   Abd: Positive bowel sounds, soft, non-tender, non-distended.  Pelvic:  Normal appearing external female genitalia. No vaginal lesions. No discharge. Wet prep and Gonorrhea/Chlamydia obtained.  Cervix nulliparous, no lesions. Uterus is consistent with dates, mobile, non-tender. No adnexal tenderness or masses  Ext: No lower extremity edema, extremities warm and well perfused.      Labs:  Results for orders placed or performed in visit on 12/21/18   Urinalysis w Reflex Microscopic If Positive   Result Value Ref Range    Color Urine Yellow     Appearance Urine Clear     Glucose Urine Negative NEG^Negative mg/dL    Bilirubin Urine Negative NEG^Negative    Ketones Urine Negative NEG^Negative mg/dL    Specific Gravity Urine <1.005 1.000 - 1.030    Blood Urine Negative NEG^Negative    pH Urine 5.0 5.0 - 9.0 pH    Protein Albumin Urine Negative NEG^Negative mg/dL    Urobilinogen Urine 0.2 0.2 - 1.0 EU/dL    Nitrite Urine Negative NEG^Negative    Leukocyte Esterase Urine Negative NEG^Negative    Source Midstream Urine    Thyrotropin GH   Result Value Ref Range    Thyrotropin 5.07 0.34 - 5.60 IU/mL   SMA: Laboratory Miscellaneous Order   Result  Value Ref Range    Miscellaneous Test         Specimen Received, Reordered and sent to Performing laboratory - Report to follow upon   completion.     CBC and Differential   Result Value Ref Range    WBC 14.3 (H) 4.0 - 11.0 10e9/L    RBC Count 4.31 3.8 - 5.2 10e12/L    Hemoglobin 12.6 11.7 - 15.7 g/dL    Hematocrit 37.2 35.0 - 47.0 %    MCV 86 78 - 100 fl    MCH 29.2 26.5 - 33.0 pg    MCHC 33.9 31.5 - 36.5 g/dL    RDW 11.8 10.0 - 15.0 %    Platelet Count 374 150 - 450 10e9/L    Diff Method Automated Method     % Neutrophils 70.6 %    % Lymphocytes 19.7 %    % Monocytes 7.0 %    % Eosinophils 0.9 %    % Basophils 0.5 %    % Immature Granulocytes 1.3 %    Absolute Neutrophil 10.1 (H) 1.6 - 8.3 10e9/L    Absolute Lymphocytes 2.8 0.8 - 5.3 10e9/L    Absolute Monocytes 1.0 0.0 - 1.3 10e9/L    Absolute Eosinophils 0.1 0.0 - 0.7 10e9/L    Absolute Basophils 0.1 0.0 - 0.2 10e9/L    Abs Immature Granulocytes 0.2 0 - 0.4 10e9/L   ABO and Rh   Result Value Ref Range    ABO O     RH(D) Pos     Specimen Expires 12/24/2018    GC/Chlamydia by PCR   Result Value Ref Range    Specimen Source Endocervical     Neisseria gonorrhoreae PCR Not Detected NDET^Not Detected    Chlamydia Trachomatis PCR Not Detected NDET^Not Detected   Wet Prep, Genital   Result Value Ref Range    Specimen Description Vagina     Wet Prep No Trichomonas seen     Wet Prep No yeast seen     Wet Prep No clue cells seen       Procedure:  US OB <14 WEEKS WITH TRANSVAGINAL SINGLE     Gestational age by LMP: 7 weeks 3 days     Clinical history: confirm dates/viability; Pregnancy, unspecified  gestational age.     Comparisons: None.     Gestation number: Single     Cardiac activity:  Present     Heart rate:  155     Measurements:      CRL:  7 weeks 2 days     Adnexa:  Unremarkable     Ultrasound Age:  7 weeks 2 days     Ultrasound EDC:  8/7/2019     Small subchorionic hemorrhage is seen.                                                                      Impression:   Single intrauterine pregnancy with estimated gestational  age of 7 weeks 2 days and estimated delivery date of 2019.     JESU ECHEVARRIA MD    Assessment/Plan:  Ms. Amanda Mckeon is a 28 year old  at 7w2d by ultrasound as well as last menstrual period, here for new OB visit. Pregnancy is complicated by hypothyroidism.    (Z34.90) Pregnancy, unspecified gestational age  (primary encounter diagnosis)  Comment: discussed ultrasound report.  Plan: Urinalysis w Reflex Microscopic If Positive,         ABO and Rh, CBC and Differential, Rubella         Antibody IgG Quantitative, Treponema Abs w         Reflex to RPR and Titer, Hepatitis B surface         antigen, HIV Antigen Antibody Combo,         GC/Chlamydia by PCR, SMA: Laboratory         Miscellaneous Order, Cystic Fibrosis 165 Path         Variants, Wet Prep, Genital, [facility] Miscellaneous        Test        Labs completed as above.  Follow up in 4 weeks for next OB visit.    (E03.9) Hypothyroidism, unspecified type  Comment: with high-normal TSH, will plan to increase synthroid to 100 mcg daily.    Plan: Thyrotropin GH        Follow up in 4 weeks.      Follow up in 4 weeks.    Liza Ordoñez MD on 2018 at 11:09 AM

## 2018-12-21 NOTE — NURSING NOTE
"Patient here for OB Physical.   Shahnaz Be LPN ..........12/21/2018 10:44 AM   Chief Complaint   Patient presents with     Prenatal Care     OB Physical.        Initial /82 (BP Location: Right arm, Patient Position: Sitting, Cuff Size: Adult Regular)   Pulse 68   Wt 97.4 kg (214 lb 11.2 oz)   LMP 10/31/2018 (Exact Date)   BMI 36.85 kg/m   Estimated body mass index is 36.85 kg/m  as calculated from the following:    Height as of 2/13/18: 1.626 m (5' 4\").    Weight as of this encounter: 97.4 kg (214 lb 11.2 oz).  Medication Reconciliation: complete    Shahnaz Be LPN    "

## 2018-12-22 LAB
RUBV IGG SERPL IA-ACNC: 16 IU/ML
T PALLIDUM AB SER QL: NONREACTIVE

## 2018-12-24 LAB
HBV SURFACE AG SERPL QL IA: NONREACTIVE
HIV 1+2 AB+HIV1 P24 AG SERPL QL IA: NONREACTIVE

## 2018-12-28 LAB
CFTR ALLELE 2 BLD/T QL: NEGATIVE
CFTR P.R117H+5T VAR BLD/T QL: NORMAL
CYSTIC FIBROSIS 165 VARIANT INTERP: NORMAL

## 2018-12-30 LAB
RESULT: NORMAL
SEND OUTS MISC TEST CODE: NORMAL
SEND OUTS MISC TEST SPECIMEN: NORMAL
TEST NAME: NORMAL

## 2018-12-31 ENCOUNTER — TELEPHONE (OUTPATIENT)
Dept: FAMILY MEDICINE | Facility: OTHER | Age: 28
End: 2018-12-31

## 2018-12-31 NOTE — TELEPHONE ENCOUNTER
Called back no answer , see result note. Bri Garcia LPN ....................12/31/2018  11:34 AM

## 2019-01-01 ENCOUNTER — HOSPITAL ENCOUNTER (EMERGENCY)
Facility: OTHER | Age: 29
Discharge: HOME | End: 2019-01-01
Attending: FAMILY MEDICINE | Admitting: FAMILY MEDICINE
Payer: COMMERCIAL

## 2019-01-01 VITALS
OXYGEN SATURATION: 96 % | HEART RATE: 106 BPM | HEIGHT: 64 IN | DIASTOLIC BLOOD PRESSURE: 85 MMHG | RESPIRATION RATE: 16 BRPM | TEMPERATURE: 98.4 F | SYSTOLIC BLOOD PRESSURE: 143 MMHG | BODY MASS INDEX: 36.54 KG/M2 | WEIGHT: 214 LBS

## 2019-01-01 DIAGNOSIS — N93.9 VAGINAL BLEEDING: ICD-10-CM

## 2019-01-01 LAB
ALBUMIN UR-MCNC: NEGATIVE MG/DL
APPEARANCE UR: CLEAR
B-HCG SERPL-ACNC: NORMAL IU/L
BACTERIA #/AREA URNS HPF: ABNORMAL /HPF
BILIRUB UR QL STRIP: NEGATIVE
COLOR UR AUTO: YELLOW
GLUCOSE UR STRIP-MCNC: NEGATIVE MG/DL
HGB UR QL STRIP: ABNORMAL
HYALINE CASTS #/AREA URNS LPF: ABNORMAL /LPF
KETONES UR STRIP-MCNC: NEGATIVE MG/DL
LEUKOCYTE ESTERASE UR QL STRIP: NEGATIVE
MUCOUS THREADS #/AREA URNS LPF: PRESENT /LPF
NITRATE UR QL: NEGATIVE
NON-SQ EPI CELLS #/AREA URNS LPF: ABNORMAL /LPF
PH UR STRIP: 6 PH (ref 5–9)
RBC #/AREA URNS AUTO: ABNORMAL /HPF
SOURCE: ABNORMAL
SP GR UR STRIP: 1.02 (ref 1–1.03)
SPERM #/AREA URNS HPF: PRESENT /HPF
UROBILINOGEN UR STRIP-ACNC: 0.2 EU/DL (ref 0.2–1)
WBC #/AREA URNS AUTO: ABNORMAL /HPF

## 2019-01-01 PROCEDURE — 99283 EMERGENCY DEPT VISIT LOW MDM: CPT | Performed by: FAMILY MEDICINE

## 2019-01-01 PROCEDURE — 81001 URINALYSIS AUTO W/SCOPE: CPT | Performed by: FAMILY MEDICINE

## 2019-01-01 PROCEDURE — 99282 EMERGENCY DEPT VISIT SF MDM: CPT | Mod: Z6 | Performed by: FAMILY MEDICINE

## 2019-01-01 PROCEDURE — 84702 CHORIONIC GONADOTROPIN TEST: CPT | Performed by: FAMILY MEDICINE

## 2019-01-01 PROCEDURE — 36415 COLL VENOUS BLD VENIPUNCTURE: CPT | Performed by: FAMILY MEDICINE

## 2019-01-01 ASSESSMENT — MIFFLIN-ST. JEOR: SCORE: 1680.7

## 2019-01-01 NOTE — ED NOTES
Patient states that she had intercourse this am and couple hours later noted the blood.  Patient has had this same issue before and MD told her that she has thin vessels on her cervix.  Patient noted to have bleeding then after intercourse as well.  Patient denies pain or abdominal cramps

## 2019-01-01 NOTE — ED TRIAGE NOTES
Pt here with family with c/o vaginal bleeding, pt states that about a half hour, she went to the bathroom and had a couple drops of blood in toilet, and when she wiped there was more pink tinged blood, pt is 8 weeks pregnant, pt's first pregnancy, VSS, pt denies any pain, no acute distress noted, pt out into waiting room to wait for ED room

## 2019-01-01 NOTE — ED AVS SNAPSHOT
[facility]  [address]  [place] [address]  Phone:  [phone]  Fax:  [phone]                                    Amanda Mckeon   MRN: [id number]    Department:  [facility]   Date of Visit:  1/1/2019           After Visit Summary Signature Page    I have received my discharge instructions, and my questions have been answered. I have discussed any challenges I see with this plan with the nurse or doctor.    ..........................................................................................................................................  Patient/Patient Representative Signature      ..........................................................................................................................................  Patient Representative Print Name and Relationship to Patient    ..................................................               ................................................  Date                                   Time    ..........................................................................................................................................  Reviewed by Signature/Title    ...................................................              ..............................................  Date                                               Time          22EPIC Rev 08/18

## 2019-01-01 NOTE — ED PROVIDER NOTES
History     Chief Complaint   Patient presents with     Vaginal Bleeding     HPI  Amanda Mckeon is a 28 year old female who is 8-9wks pregnant, confirmed in clinic.  She is RH positive.    Presents with a small amount of spotting this am after intercourse.      No clots or cramping.      She is just really concerned about the health of the baby.    Problem List:    Patient Active Problem List    Diagnosis Date Noted     Pregnancy, unspecified gestational age 12/21/2018     Priority: Medium     Hypothyroidism, unspecified type 08/10/2018     Priority: Medium     Chronic seasonal allergic rhinitis, unspecified trigger 08/10/2018     Priority: Medium     Encounter for contraceptive management, unspecified type 08/10/2018     Priority: Medium     Ganglion cyst 08/14/2017     Priority: Medium     Recurrent cold sores 11/11/2016     Priority: Medium        Past Medical History:    Past Medical History:   Diagnosis Date     Cyst of right ovary      Herpesviral vesicular dermatitis      Hypothyroidism        Past Surgical History:    Past Surgical History:   Procedure Laterality Date     ADENOIDECTOMY      as a child     AS SHOULDER ARTHROSCOPY, DX Right     Dr. Bolivar - age 12     MAMMOPLASTY REDUCTION      2007     PE TUBES      as a child       Family History:    Family History   Problem Relation Age of Onset     Family History Negative Mother      Lupus Father      Family History Negative Brother      Kidney Disease Maternal Grandmother      Bladder Cancer Maternal Grandfather      Family History Negative Paternal Grandmother      Diabetes Paternal Grandfather        Social History:  Marital Status:  Single [1]  Social History     Tobacco Use     Smoking status: Never Smoker     Smokeless tobacco: Never Used   Substance Use Topics     Alcohol use: No     Drug use: No     Comment: Drug use: No        Medications:      fluticasone (FLONASE) 50 MCG/ACT spray   folic acid (FOLVITE) 1 MG tablet   levothyroxine  "(SYNTHROID/LEVOTHROID) 100 MCG tablet   acyclovir (ZOVIRAX) 800 MG tablet         Review of Systems  She has not been otherwise unwell  Physical Exam   BP: 143/85  Pulse: 106  Temp: 98.4  F (36.9  C)  Resp: 16  Height: 162.6 cm (5' 4\")  Weight: 97.1 kg (214 lb)  SpO2: 96 %      Physical Exam  Well, tearful woman.  Heart reg.  Lungs clear.  Pelvic with nurse present yields a closed os with no blood noted in vaginal vault, no abnormalities noted at the cervix.      B-HCG quant pending.  Will repeat in 48hrs in a clinic setting.    ED Course        Procedures               Critical Care time:  none               Results for orders placed or performed during the hospital encounter of 01/01/19 (from the past 24 hour(s))   *UA reflex to Microscopic   Result Value Ref Range    Color Urine Yellow     Appearance Urine Clear     Glucose Urine Negative NEG^Negative mg/dL    Bilirubin Urine Negative NEG^Negative    Ketones Urine Negative NEG^Negative mg/dL    Specific Gravity Urine 1.020 1.000 - 1.030    Blood Urine Small (A) NEG^Negative    pH Urine 6.0 5.0 - 9.0 pH    Protein Albumin Urine Negative NEG^Negative mg/dL    Urobilinogen Urine 0.2 0.2 - 1.0 EU/dL    Nitrite Urine Negative NEG^Negative    Leukocyte Esterase Urine Negative NEG^Negative    Source Midstream Urine    Urine Microscopic   Result Value Ref Range    WBC Urine 0 - 5 OTO5^0 - 5 /HPF    RBC Urine 2-5 (A) OTO2^O - 2 /HPF    Hyaline Casts O - 2 OTO2^O - 2 /LPF    Squamous Epithelial /LPF Urine Few FEW^Few /LPF    Bacteria Urine Few (A) NEG^Negative /HPF    Mucous Urine Present (A) NEG^Negative /LPF    sperm Present (A) NEG^Negative /HPF       Medications - No data to display    Assessments & Plan (with Medical Decision Making)     I have reviewed the nursing notes.    I have reviewed the findings, diagnosis, plan and need for follow up with the patient.   spoke to this primigravida about early trimester vaginal bleeding, which is quite common (40-50% of all " pregnancies).  Also, bleeding occurred after intercourse.  Overwhelming odds here are that the baby is healthy.  However, I did offer the patient an ultrasound, which does not change or alter the management or risk of miscarriage in any way.  She became upset because she did not know if insurance would cover it.      She ultimately declined.    No Rhogam needed.    Will get a repeat HCG in 48hrs.      She appears extremely anxious and tearful.  We tried our best to allay her concerns.         Medication List      There are no discharge medications for this visit.         Final diagnoses:   Vaginal bleeding       1/1/2019   [facility] [place] [facility]", Kemar ZHU MD  01/01/19 1440

## 2019-01-02 ENCOUNTER — MYC MEDICAL ADVICE (OUTPATIENT)
Dept: FAMILY MEDICINE | Facility: OTHER | Age: 29
End: 2019-01-02

## 2019-01-02 DIAGNOSIS — O20.9 FIRST TRIMESTER BLEEDING: Primary | ICD-10-CM

## 2019-01-04 ENCOUNTER — HOSPITAL ENCOUNTER (OUTPATIENT)
Dept: ULTRASOUND IMAGING | Facility: OTHER | Age: 29
Discharge: HOME | End: 2019-01-04
Attending: FAMILY MEDICINE | Admitting: FAMILY MEDICINE
Payer: COMMERCIAL

## 2019-01-04 DIAGNOSIS — O20.9 FIRST TRIMESTER BLEEDING: Primary | ICD-10-CM

## 2019-01-04 DIAGNOSIS — O20.9 FIRST TRIMESTER BLEEDING: ICD-10-CM

## 2019-01-04 DIAGNOSIS — E03.9 HYPOTHYROIDISM, UNSPECIFIED TYPE: ICD-10-CM

## 2019-01-04 LAB
B-HCG SERPL-ACNC: NORMAL IU/L
TSH SERPL DL<=0.05 MIU/L-ACNC: 4.86 IU/ML (ref 0.34–5.6)

## 2019-01-04 PROCEDURE — 36415 COLL VENOUS BLD VENIPUNCTURE: CPT | Performed by: FAMILY MEDICINE

## 2019-01-04 PROCEDURE — 84702 CHORIONIC GONADOTROPIN TEST: CPT | Performed by: FAMILY MEDICINE

## 2019-01-04 PROCEDURE — 76801 OB US < 14 WKS SINGLE FETUS: CPT

## 2019-01-04 PROCEDURE — 84443 ASSAY THYROID STIM HORMONE: CPT | Performed by: FAMILY MEDICINE

## 2019-01-21 ENCOUNTER — PRENATAL OFFICE VISIT (OUTPATIENT)
Dept: FAMILY MEDICINE | Facility: OTHER | Age: 29
End: 2019-01-21
Attending: FAMILY MEDICINE
Payer: COMMERCIAL

## 2019-01-21 VITALS
SYSTOLIC BLOOD PRESSURE: 120 MMHG | RESPIRATION RATE: 20 BRPM | HEIGHT: 64 IN | BODY MASS INDEX: 37.05 KG/M2 | HEART RATE: 76 BPM | TEMPERATURE: 98.4 F | DIASTOLIC BLOOD PRESSURE: 80 MMHG | WEIGHT: 217 LBS

## 2019-01-21 DIAGNOSIS — Z3A.11 11 WEEKS GESTATION OF PREGNANCY: Primary | ICD-10-CM

## 2019-01-21 DIAGNOSIS — E03.9 HYPOTHYROIDISM, UNSPECIFIED TYPE: ICD-10-CM

## 2019-01-21 LAB
ALBUMIN UR-MCNC: NEGATIVE MG/DL
APPEARANCE UR: CLEAR
BILIRUB UR QL STRIP: NEGATIVE
COLOR UR AUTO: YELLOW
GLUCOSE UR STRIP-MCNC: NEGATIVE MG/DL
HGB UR QL STRIP: NEGATIVE
KETONES UR STRIP-MCNC: NEGATIVE MG/DL
LEUKOCYTE ESTERASE UR QL STRIP: NEGATIVE
NITRATE UR QL: NEGATIVE
PH UR STRIP: 6.5 PH (ref 5–9)
SOURCE: NORMAL
SP GR UR STRIP: <1.005 (ref 1–1.03)
TSH SERPL DL<=0.05 MIU/L-ACNC: 3.13 IU/ML (ref 0.34–5.6)
UROBILINOGEN UR STRIP-ACNC: 0.2 EU/DL (ref 0.2–1)

## 2019-01-21 PROCEDURE — 84443 ASSAY THYROID STIM HORMONE: CPT | Performed by: FAMILY MEDICINE

## 2019-01-21 PROCEDURE — 81003 URINALYSIS AUTO W/O SCOPE: CPT | Performed by: FAMILY MEDICINE

## 2019-01-21 PROCEDURE — 36415 COLL VENOUS BLD VENIPUNCTURE: CPT | Performed by: FAMILY MEDICINE

## 2019-01-21 PROCEDURE — 99207 ZZC OB VISIT-NO CHARGE - GICH ONLY: CPT | Performed by: FAMILY MEDICINE

## 2019-01-21 ASSESSMENT — PAIN SCALES - GENERAL: PAINLEVEL: NO PAIN (0)

## 2019-01-21 ASSESSMENT — MIFFLIN-ST. JEOR: SCORE: 1694.31

## 2019-01-21 NOTE — NURSING NOTE
"Chief Complaint   Patient presents with     Prenatal Care     11wk5d       Initial /80 (BP Location: Right arm, Patient Position: Sitting, Cuff Size: Adult Large)   Pulse 76   Temp 98.4  F (36.9  C) (Tympanic)   Resp 20   Ht 1.626 m (5' 4\")   Wt 98.4 kg (217 lb)   LMP 10/31/2018 (Exact Date)   BMI 37.25 kg/m   Estimated body mass index is 37.25 kg/m  as calculated from the following:    Height as of this encounter: 1.626 m (5' 4\").    Weight as of this encounter: 98.4 kg (217 lb).  Medication Reconciliation: complete    Bri Garcia LPN  "

## 2019-01-21 NOTE — PROGRESS NOTES
No bleeding for about 3 weeks.  Still feels very gaggy and nauseous at times.  Occasional vomiting.  No cramping.  Interested in [place] screen.  They would like to find out baby's sex, but would like to have it written down for a possible gender reveal party.  Repeat TSH today.  Follow up in 4 weeks.  Liza Ordoñez MD on 1/21/2019 at 5:35 PM

## 2019-01-29 ENCOUNTER — TELEPHONE (OUTPATIENT)
Dept: FAMILY MEDICINE | Facility: OTHER | Age: 29
End: 2019-01-29

## 2019-01-29 NOTE — TELEPHONE ENCOUNTER
Please call - [place] screen was normal.  Baby's sex was determined.  I think she wanted it written on a piece of paper and mailed to her.  Sex is MALE.    Liza Ordoñez MD on 1/29/2019 at 4:55 PM

## 2019-01-30 NOTE — TELEPHONE ENCOUNTER
Patient notified, is going to  envelope at unit 4.  Rosalina Funk LPN .............1/30/2019     9:51 AM

## 2019-02-18 ENCOUNTER — PRENATAL OFFICE VISIT (OUTPATIENT)
Dept: FAMILY MEDICINE | Facility: OTHER | Age: 29
End: 2019-02-18
Attending: FAMILY MEDICINE
Payer: COMMERCIAL

## 2019-02-18 VITALS
BODY MASS INDEX: 37.14 KG/M2 | HEART RATE: 84 BPM | TEMPERATURE: 98 F | DIASTOLIC BLOOD PRESSURE: 84 MMHG | WEIGHT: 216.4 LBS | SYSTOLIC BLOOD PRESSURE: 128 MMHG | RESPIRATION RATE: 18 BRPM

## 2019-02-18 DIAGNOSIS — Z34.90 PREGNANCY, UNSPECIFIED GESTATIONAL AGE: Primary | ICD-10-CM

## 2019-02-18 DIAGNOSIS — Z3A.11 11 WEEKS GESTATION OF PREGNANCY: ICD-10-CM

## 2019-02-18 DIAGNOSIS — E03.9 HYPOTHYROIDISM, UNSPECIFIED TYPE: ICD-10-CM

## 2019-02-18 LAB
ALBUMIN UR-MCNC: NEGATIVE MG/DL
APPEARANCE UR: CLEAR
BILIRUB UR QL STRIP: NEGATIVE
COLOR UR AUTO: YELLOW
GLUCOSE UR STRIP-MCNC: NEGATIVE MG/DL
HGB UR QL STRIP: NEGATIVE
KETONES UR STRIP-MCNC: ABNORMAL MG/DL
LEUKOCYTE ESTERASE UR QL STRIP: NEGATIVE
NITRATE UR QL: NEGATIVE
PH UR STRIP: 5.5 PH (ref 5–9)
SOURCE: ABNORMAL
SP GR UR STRIP: >1.03 (ref 1–1.03)
TSH SERPL DL<=0.05 MIU/L-ACNC: 3.15 IU/ML (ref 0.34–5.6)
UROBILINOGEN UR STRIP-ACNC: 0.2 EU/DL (ref 0.2–1)

## 2019-02-18 PROCEDURE — 84443 ASSAY THYROID STIM HORMONE: CPT | Performed by: FAMILY MEDICINE

## 2019-02-18 PROCEDURE — 99207 ZZC OB VISIT-NO CHARGE - GICH ONLY: CPT | Performed by: FAMILY MEDICINE

## 2019-02-18 PROCEDURE — 81003 URINALYSIS AUTO W/O SCOPE: CPT | Performed by: FAMILY MEDICINE

## 2019-02-18 PROCEDURE — 36415 COLL VENOUS BLD VENIPUNCTURE: CPT | Performed by: FAMILY MEDICINE

## 2019-02-18 ASSESSMENT — PAIN SCALES - GENERAL: PAINLEVEL: NO PAIN (0)

## 2019-02-18 NOTE — PROGRESS NOTES
No bleeding or fluid leaking.  Occasional lower abdominal pain that is brief, sounds consistent with round ligament pain.  [place] screen was negative.  Fetal sex is male.  20 week ultrasound ordered.  Plan [facility] screen at next visit.  Repeat TSH ordered today.  Follow up in 4 weeks for next visit.  Liza Ordoñez MD on 2/18/2019 at 4:31 PM

## 2019-02-18 NOTE — NURSING NOTE
"Chief Complaint   Patient presents with     Prenatal Care     BONI 08/07/2019  15.5 weeks       Initial /84   Pulse 84   Temp 98  F (36.7  C) (Tympanic)   Resp 18   Wt 98.2 kg (216 lb 6.4 oz)   LMP 10/31/2018 (Exact Date)   Breastfeeding? No   BMI 37.14 kg/m   Estimated body mass index is 37.14 kg/m  as calculated from the following:    Height as of 1/21/19: 1.626 m (5' 4\").    Weight as of this encounter: 98.2 kg (216 lb 6.4 oz).  Medication Reconciliation: complete    Analia Wharton LPN  "

## 2019-03-15 ENCOUNTER — PRENATAL OFFICE VISIT (OUTPATIENT)
Dept: FAMILY MEDICINE | Facility: OTHER | Age: 29
End: 2019-03-15
Attending: FAMILY MEDICINE
Payer: COMMERCIAL

## 2019-03-15 VITALS
SYSTOLIC BLOOD PRESSURE: 124 MMHG | HEART RATE: 74 BPM | DIASTOLIC BLOOD PRESSURE: 80 MMHG | RESPIRATION RATE: 20 BRPM | WEIGHT: 220 LBS | HEIGHT: 64 IN | TEMPERATURE: 98.6 F | BODY MASS INDEX: 37.56 KG/M2

## 2019-03-15 DIAGNOSIS — E03.9 HYPOTHYROIDISM, UNSPECIFIED TYPE: ICD-10-CM

## 2019-03-15 DIAGNOSIS — Z3A.19 19 WEEKS GESTATION OF PREGNANCY: Primary | ICD-10-CM

## 2019-03-15 LAB
ALBUMIN UR-MCNC: NEGATIVE MG/DL
APPEARANCE UR: CLEAR
BILIRUB UR QL STRIP: NEGATIVE
COLOR UR AUTO: YELLOW
GLUCOSE UR STRIP-MCNC: NEGATIVE MG/DL
HGB UR QL STRIP: NEGATIVE
KETONES UR STRIP-MCNC: NEGATIVE MG/DL
LEUKOCYTE ESTERASE UR QL STRIP: NEGATIVE
NITRATE UR QL: NEGATIVE
PH UR STRIP: 5 PH (ref 5–9)
SOURCE: NORMAL
SP GR UR STRIP: 1.02 (ref 1–1.03)
TSH SERPL DL<=0.05 MIU/L-ACNC: 2.04 IU/ML (ref 0.34–5.6)
UROBILINOGEN UR STRIP-ACNC: 0.2 EU/DL (ref 0.2–1)

## 2019-03-15 PROCEDURE — 99207 ZZC OB VISIT-NO CHARGE - GICH ONLY: CPT | Performed by: FAMILY MEDICINE

## 2019-03-15 PROCEDURE — 00000219 ZZHCL STATISTIC OBSA - URINALYSIS: Performed by: FAMILY MEDICINE

## 2019-03-15 PROCEDURE — 81003 URINALYSIS AUTO W/O SCOPE: CPT | Performed by: FAMILY MEDICINE

## 2019-03-15 PROCEDURE — 84443 ASSAY THYROID STIM HORMONE: CPT | Performed by: FAMILY MEDICINE

## 2019-03-15 PROCEDURE — 36415 COLL VENOUS BLD VENIPUNCTURE: CPT | Performed by: FAMILY MEDICINE

## 2019-03-15 ASSESSMENT — MIFFLIN-ST. JEOR: SCORE: 1707.91

## 2019-03-15 ASSESSMENT — PAIN SCALES - GENERAL: PAINLEVEL: NO PAIN (0)

## 2019-03-15 NOTE — PROGRESS NOTES
Just starting to feel a bit movement.  No bleeding or fluid leaking.  No cramping/contractions.  Declined Quad screen after she thought more about it.  Repeated TSH today.  ultrasound is scheduled for 3/20/19.  Follow up with me in 4 weeks.  Liza Ordoñez MD on 3/15/2019 at 4:41 PM

## 2019-03-16 ENCOUNTER — MYC MEDICAL ADVICE (OUTPATIENT)
Dept: FAMILY MEDICINE | Facility: OTHER | Age: 29
End: 2019-03-16

## 2019-03-20 ENCOUNTER — HOSPITAL ENCOUNTER (OUTPATIENT)
Dept: ULTRASOUND IMAGING | Facility: OTHER | Age: 29
Discharge: HOME | End: 2019-03-20
Attending: FAMILY MEDICINE | Admitting: FAMILY MEDICINE
Payer: COMMERCIAL

## 2019-03-20 DIAGNOSIS — Z34.90 PREGNANCY, UNSPECIFIED GESTATIONAL AGE: ICD-10-CM

## 2019-03-20 PROCEDURE — 76805 OB US >/= 14 WKS SNGL FETUS: CPT

## 2019-03-21 ENCOUNTER — TELEPHONE (OUTPATIENT)
Dept: FAMILY MEDICINE | Facility: OTHER | Age: 29
End: 2019-03-21

## 2019-03-21 ENCOUNTER — MYC MEDICAL ADVICE (OUTPATIENT)
Dept: FAMILY MEDICINE | Facility: OTHER | Age: 29
End: 2019-03-21

## 2019-03-21 DIAGNOSIS — Z34.90 PREGNANCY, UNSPECIFIED GESTATIONAL AGE: Primary | ICD-10-CM

## 2019-03-21 NOTE — TELEPHONE ENCOUNTER
"Spoke with Dr. Farooq Benitez and she states that \"yes\" - unremarkable is good.  Carly Gilman ....................  3/21/2019   1:34 PM      "

## 2019-04-09 ENCOUNTER — HOSPITAL ENCOUNTER (OUTPATIENT)
Dept: ULTRASOUND IMAGING | Facility: OTHER | Age: 29
Discharge: HOME | End: 2019-04-09
Attending: FAMILY MEDICINE | Admitting: FAMILY MEDICINE
Payer: COMMERCIAL

## 2019-04-09 DIAGNOSIS — Z34.90 PREGNANCY, UNSPECIFIED GESTATIONAL AGE: ICD-10-CM

## 2019-04-09 PROCEDURE — 76815 OB US LIMITED FETUS(S): CPT

## 2019-04-11 ENCOUNTER — PRENATAL OFFICE VISIT (OUTPATIENT)
Dept: FAMILY MEDICINE | Facility: OTHER | Age: 29
End: 2019-04-11
Attending: FAMILY MEDICINE
Payer: COMMERCIAL

## 2019-04-11 VITALS
SYSTOLIC BLOOD PRESSURE: 124 MMHG | BODY MASS INDEX: 38.41 KG/M2 | TEMPERATURE: 97.2 F | DIASTOLIC BLOOD PRESSURE: 80 MMHG | HEIGHT: 64 IN | HEART RATE: 76 BPM | RESPIRATION RATE: 20 BRPM | WEIGHT: 225 LBS

## 2019-04-11 DIAGNOSIS — E03.9 HYPOTHYROIDISM, UNSPECIFIED TYPE: ICD-10-CM

## 2019-04-11 DIAGNOSIS — Z3A.23 23 WEEKS GESTATION OF PREGNANCY: Primary | ICD-10-CM

## 2019-04-11 LAB
ALBUMIN UR-MCNC: NEGATIVE MG/DL
AMORPH CRY #/AREA URNS HPF: ABNORMAL /HPF
APPEARANCE UR: NORMAL
BACTERIA #/AREA URNS HPF: ABNORMAL /HPF
BILIRUB UR QL STRIP: NEGATIVE
COLOR UR AUTO: YELLOW
GLUCOSE UR STRIP-MCNC: NEGATIVE MG/DL
HGB UR QL STRIP: NEGATIVE
KETONES UR STRIP-MCNC: NEGATIVE MG/DL
LEUKOCYTE ESTERASE UR QL STRIP: NEGATIVE
NITRATE UR QL: NEGATIVE
NON-SQ EPI CELLS #/AREA URNS LPF: ABNORMAL /LPF
PH UR STRIP: 7 PH (ref 5–9)
RBC #/AREA URNS AUTO: ABNORMAL /HPF
SOURCE: NORMAL
SP GR UR STRIP: 1.01 (ref 1–1.03)
TSH SERPL DL<=0.05 MIU/L-ACNC: 1.56 IU/ML (ref 0.34–5.6)
UROBILINOGEN UR STRIP-ACNC: 0.2 EU/DL (ref 0.2–1)
WBC #/AREA URNS AUTO: ABNORMAL /HPF

## 2019-04-11 PROCEDURE — 84443 ASSAY THYROID STIM HORMONE: CPT | Performed by: FAMILY MEDICINE

## 2019-04-11 PROCEDURE — 81001 URINALYSIS AUTO W/SCOPE: CPT | Performed by: FAMILY MEDICINE

## 2019-04-11 PROCEDURE — 99207 ZZC OB VISIT-NO CHARGE - GICH ONLY: CPT | Performed by: FAMILY MEDICINE

## 2019-04-11 PROCEDURE — 36415 COLL VENOUS BLD VENIPUNCTURE: CPT | Performed by: FAMILY MEDICINE

## 2019-04-11 ASSESSMENT — MIFFLIN-ST. JEOR: SCORE: 1730.59

## 2019-04-11 ASSESSMENT — PAIN SCALES - GENERAL: PAINLEVEL: NO PAIN (0)

## 2019-04-11 NOTE — PROGRESS NOTES
No bleeding, fluid leaking.  No contractions.  Ultrasound reviewed.  TSH repeated today.  Follow up in 4 weeks for next OB visit.  1 hour GTT, hemoglobin and treponema pallidum at that time.  Follow up sooner if any other concerns.  Liza Ordoñez MD on 4/11/2019 at 4:47 PM

## 2019-04-11 NOTE — NURSING NOTE
"Chief Complaint   Patient presents with     Prenatal Care     23wk1d       Initial /80 (BP Location: Right arm, Patient Position: Sitting, Cuff Size: Adult Large)   Pulse 76   Temp 97.2  F (36.2  C) (Tympanic)   Resp 20   Ht 1.626 m (5' 4\")   Wt 102.1 kg (225 lb)   LMP 10/31/2018 (Exact Date)   BMI 38.62 kg/m   Estimated body mass index is 38.62 kg/m  as calculated from the following:    Height as of this encounter: 1.626 m (5' 4\").    Weight as of this encounter: 102.1 kg (225 lb).  Medication Reconciliation: complete    Bri Garcia LPN  "

## 2019-04-18 ENCOUNTER — MYC MEDICAL ADVICE (OUTPATIENT)
Dept: FAMILY MEDICINE | Facility: OTHER | Age: 29
End: 2019-04-18

## 2019-05-03 ENCOUNTER — MYC MEDICAL ADVICE (OUTPATIENT)
Dept: FAMILY MEDICINE | Facility: OTHER | Age: 29
End: 2019-05-03

## 2019-05-07 ENCOUNTER — TELEPHONE (OUTPATIENT)
Dept: FAMILY MEDICINE | Facility: OTHER | Age: 29
End: 2019-05-07
Payer: COMMERCIAL

## 2019-05-07 DIAGNOSIS — Z3A.26 26 WEEKS GESTATION OF PREGNANCY: Primary | ICD-10-CM

## 2019-05-14 ENCOUNTER — PRENATAL OFFICE VISIT (OUTPATIENT)
Dept: FAMILY MEDICINE | Facility: OTHER | Age: 29
End: 2019-05-14
Attending: FAMILY MEDICINE
Payer: COMMERCIAL

## 2019-05-14 ENCOUNTER — MYC MEDICAL ADVICE (OUTPATIENT)
Dept: FAMILY MEDICINE | Facility: OTHER | Age: 29
End: 2019-05-14

## 2019-05-14 VITALS
BODY MASS INDEX: 38.24 KG/M2 | SYSTOLIC BLOOD PRESSURE: 124 MMHG | WEIGHT: 224 LBS | DIASTOLIC BLOOD PRESSURE: 80 MMHG | TEMPERATURE: 98.3 F | HEIGHT: 64 IN | RESPIRATION RATE: 20 BRPM | HEART RATE: 74 BPM

## 2019-05-14 DIAGNOSIS — Z3A.23 23 WEEKS GESTATION OF PREGNANCY: ICD-10-CM

## 2019-05-14 DIAGNOSIS — Z3A.26 26 WEEKS GESTATION OF PREGNANCY: ICD-10-CM

## 2019-05-14 DIAGNOSIS — E03.9 HYPOTHYROIDISM, UNSPECIFIED TYPE: ICD-10-CM

## 2019-05-14 DIAGNOSIS — Z3A.27 27 WEEKS GESTATION OF PREGNANCY: Primary | ICD-10-CM

## 2019-05-14 DIAGNOSIS — O99.810 ABNORMAL GLUCOSE TOLERANCE IN PREGNANCY: Primary | ICD-10-CM

## 2019-05-14 DIAGNOSIS — Z23 NEED FOR TDAP VACCINATION: ICD-10-CM

## 2019-05-14 LAB
ALBUMIN UR-MCNC: NEGATIVE MG/DL
APPEARANCE UR: CLEAR
BILIRUB UR QL STRIP: NEGATIVE
COLOR UR AUTO: YELLOW
GLUCOSE 1H P 50 G GLC PO SERPL-MCNC: 178 MG/DL (ref 60–129)
GLUCOSE UR STRIP-MCNC: NEGATIVE MG/DL
HGB BLD-MCNC: 11.6 G/DL (ref 11.7–15.7)
HGB UR QL STRIP: NEGATIVE
KETONES UR STRIP-MCNC: 40 MG/DL
LEUKOCYTE ESTERASE UR QL STRIP: NEGATIVE
NITRATE UR QL: NEGATIVE
PH UR STRIP: 5.5 PH (ref 5–9)
SOURCE: ABNORMAL
SP GR UR STRIP: 1.02 (ref 1–1.03)
TSH SERPL DL<=0.05 MIU/L-ACNC: 1.56 IU/ML (ref 0.34–5.6)
UROBILINOGEN UR STRIP-ACNC: 0.2 EU/DL (ref 0.2–1)

## 2019-05-14 PROCEDURE — 99207 ZZC OB VISIT-NO CHARGE - GICH ONLY: CPT | Performed by: FAMILY MEDICINE

## 2019-05-14 PROCEDURE — 90471 IMMUNIZATION ADMIN: CPT | Performed by: FAMILY MEDICINE

## 2019-05-14 PROCEDURE — 85018 HEMOGLOBIN: CPT | Performed by: FAMILY MEDICINE

## 2019-05-14 PROCEDURE — 84443 ASSAY THYROID STIM HORMONE: CPT | Mod: ZL | Performed by: FAMILY MEDICINE

## 2019-05-14 PROCEDURE — 36415 COLL VENOUS BLD VENIPUNCTURE: CPT | Mod: ZL | Performed by: FAMILY MEDICINE

## 2019-05-14 PROCEDURE — 81003 URINALYSIS AUTO W/O SCOPE: CPT | Performed by: FAMILY MEDICINE

## 2019-05-14 PROCEDURE — 86780 TREPONEMA PALLIDUM: CPT | Performed by: FAMILY MEDICINE

## 2019-05-14 PROCEDURE — 90715 TDAP VACCINE 7 YRS/> IM: CPT | Performed by: FAMILY MEDICINE

## 2019-05-14 PROCEDURE — 36415 COLL VENOUS BLD VENIPUNCTURE: CPT | Performed by: FAMILY MEDICINE

## 2019-05-14 PROCEDURE — 82950 GLUCOSE TEST: CPT | Performed by: FAMILY MEDICINE

## 2019-05-14 ASSESSMENT — ANXIETY QUESTIONNAIRES
2. NOT BEING ABLE TO STOP OR CONTROL WORRYING: NOT AT ALL
3. WORRYING TOO MUCH ABOUT DIFFERENT THINGS: NOT AT ALL
GAD7 TOTAL SCORE: 0
7. FEELING AFRAID AS IF SOMETHING AWFUL MIGHT HAPPEN: NOT AT ALL
6. BECOMING EASILY ANNOYED OR IRRITABLE: NOT AT ALL
1. FEELING NERVOUS, ANXIOUS, OR ON EDGE: NOT AT ALL
IF YOU CHECKED OFF ANY PROBLEMS ON THIS QUESTIONNAIRE, HOW DIFFICULT HAVE THESE PROBLEMS MADE IT FOR YOU TO DO YOUR WORK, TAKE CARE OF THINGS AT HOME, OR GET ALONG WITH OTHER PEOPLE: NOT DIFFICULT AT ALL
5. BEING SO RESTLESS THAT IT IS HARD TO SIT STILL: NOT AT ALL

## 2019-05-14 ASSESSMENT — PATIENT HEALTH QUESTIONNAIRE - PHQ9
SUM OF ALL RESPONSES TO PHQ QUESTIONS 1-9: 0
5. POOR APPETITE OR OVEREATING: NOT AT ALL

## 2019-05-14 ASSESSMENT — MIFFLIN-ST. JEOR: SCORE: 1726.06

## 2019-05-14 ASSESSMENT — PAIN SCALES - GENERAL: PAINLEVEL: NO PAIN (0)

## 2019-05-14 NOTE — PROGRESS NOTES
No bleeding or fluid leaking.  No contractions or cramping.  Tdap updated today.  1 hour GTT, hemoglobin, TSH and treponema pallidum were obtained today.  Follow up in 4 weeks for next OB visit, sooner if any other problems develop.  Liza Ordoñez MD on 5/14/2019 at 4:39 PM

## 2019-05-14 NOTE — NURSING NOTE
"Chief Complaint   Patient presents with     Prenatal Care     27 wk 6 d       Initial /80 (BP Location: Right arm, Patient Position: Sitting, Cuff Size: Adult Large)   Pulse 74   Temp 98.3  F (36.8  C) (Tympanic)   Resp 20   Ht 1.626 m (5' 4\")   Wt 101.6 kg (224 lb)   LMP 10/31/2018 (Exact Date)   BMI 38.45 kg/m   Estimated body mass index is 38.45 kg/m  as calculated from the following:    Height as of this encounter: 1.626 m (5' 4\").    Weight as of this encounter: 101.6 kg (224 lb).  Medication Reconciliation: complete    Bri Garcia LPN  "

## 2019-05-15 ASSESSMENT — ANXIETY QUESTIONNAIRES: GAD7 TOTAL SCORE: 0

## 2019-05-16 LAB — T PALLIDUM AB SER QL: NONREACTIVE

## 2019-05-22 DIAGNOSIS — O99.810 ABNORMAL GLUCOSE TOLERANCE IN PREGNANCY: ICD-10-CM

## 2019-05-22 LAB
GLUCOSE 1H P 100 G GLC PO SERPL-MCNC: 239 MG/DL (ref 60–179)
GLUCOSE 2H P 100 G GLC PO SERPL-MCNC: 195 MG/DL (ref 60–154)
GLUCOSE 3H P 100 G GLC PO SERPL-MCNC: 161 MG/DL (ref 60–139)
GLUCOSE P FAST SERPL-MCNC: 105 MG/DL (ref 60–94)

## 2019-05-22 PROCEDURE — 82952 GTT-ADDED SAMPLES: CPT | Performed by: FAMILY MEDICINE

## 2019-05-22 PROCEDURE — 82951 GLUCOSE TOLERANCE TEST (GTT): CPT | Performed by: FAMILY MEDICINE

## 2019-05-22 PROCEDURE — 36415 COLL VENOUS BLD VENIPUNCTURE: CPT | Performed by: FAMILY MEDICINE

## 2019-05-23 ENCOUNTER — MYC MEDICAL ADVICE (OUTPATIENT)
Dept: FAMILY MEDICINE | Facility: OTHER | Age: 29
End: 2019-05-23

## 2019-05-23 DIAGNOSIS — O24.419 GESTATIONAL DIABETES MELLITUS (GDM) IN THIRD TRIMESTER, GESTATIONAL DIABETES METHOD OF CONTROL UNSPECIFIED: Primary | ICD-10-CM

## 2019-05-23 RX ORDER — GLUCOSAMINE HCL/CHONDROITIN SU 500-400 MG
CAPSULE ORAL
Qty: 100 EACH | Refills: 3 | Status: SHIPPED | OUTPATIENT
Start: 2019-05-23 | End: 2019-08-09

## 2019-05-23 RX ORDER — LANCETS
EACH MISCELLANEOUS
Qty: 100 EACH | Refills: 6 | Status: SHIPPED | OUTPATIENT
Start: 2019-05-23 | End: 2019-08-09

## 2019-05-23 NOTE — TELEPHONE ENCOUNTER
I had called patient and given results of the 3 hour glucose and that supplies were sent to pharmacy and that she would receive a call to see diabetic education . Bri Garcia LPN ....................5/23/2019  3:06 PM]

## 2019-05-24 ENCOUNTER — TELEPHONE (OUTPATIENT)
Dept: EDUCATION SERVICES | Facility: OTHER | Age: 29
End: 2019-05-24

## 2019-05-24 ENCOUNTER — TELEPHONE (OUTPATIENT)
Dept: FAMILY MEDICINE | Facility: OTHER | Age: 29
End: 2019-05-24

## 2019-05-24 NOTE — TELEPHONE ENCOUNTER
Could a  check to see if she could be seen in [facility] before this?  Liza Ordoñez MD on 5/24/2019 at 3:22 PM

## 2019-05-24 NOTE — TELEPHONE ENCOUNTER
Wondering if we can work josette in to see you for her gestational diabetes. Your next opening isn't until 6/11 and she will be close to the end of her pregnancy. No other facilities in the local area offer this and she won't travel to [place]. Please call the patient to let her know. thank you!

## 2019-05-24 NOTE — TELEPHONE ENCOUNTER
Sent message to schedulers. They will call patient. Bri Garcia LPN ....................5/24/2019  3:47 PM

## 2019-05-24 NOTE — TELEPHONE ENCOUNTER
Patient said she received a call from someone about setting up an appointment with [facility] for gestational diabetes. [facility]ev's next available is not until 6/12/19. Patient wants to be seen before that date.

## 2019-05-29 NOTE — TELEPHONE ENCOUNTER
Spoke with patient, will see her on Tuesday, June 5th, 4:00 pm.    Bev Sanz RN, BSN, CDE  5/29/2019 9:24 AM

## 2019-06-05 ENCOUNTER — ALLIED HEALTH/NURSE VISIT (OUTPATIENT)
Dept: EDUCATION SERVICES | Facility: OTHER | Age: 29
End: 2019-06-05
Payer: COMMERCIAL

## 2019-06-05 DIAGNOSIS — O24.419 GESTATIONAL DIABETES MELLITUS (GDM) IN THIRD TRIMESTER, GESTATIONAL DIABETES METHOD OF CONTROL UNSPECIFIED: Primary | ICD-10-CM

## 2019-06-05 PROCEDURE — G0108 DIAB MANAGE TRN  PER INDIV: HCPCS | Performed by: REGISTERED NURSE

## 2019-06-05 RX ORDER — LANCING DEVICE/LANCETS
KIT MISCELLANEOUS
Qty: 1 EACH | Refills: 0 | Status: SHIPPED | OUTPATIENT
Start: 2019-06-05 | End: 2019-08-09

## 2019-06-05 NOTE — PROGRESS NOTES
Diabetes Self-Management Education & Support    Gestational Diabetes Self-Management Education & Support    SUBJECTIVE/OBJECTIVE:  Presents for education related to gestational diabetes.    Accompanied by: Self    Cultural Influences/Ethnic Background:  American    Estimated Date of Delivery: Aug 7, 2019    1 hour OGTT  Lab Results   Component Value Date    GLU1 178 (H) 05/14/2019       3 hour OGTT    Fasting  Lab Results   Component Value Date     (H) 05/22/2019       1 hour  Lab Results   Component Value Date    GL1 239 (H) 05/22/2019       2 hour  Lab Results   Component Value Date    GL2 195 (H) 05/22/2019       3 hour  Lab Results   Component Value Date    GL3 161 (H) 05/22/2019       Lifestyle and Health Behaviors:  Exercise:: Yes  Days per week of moderate to strenuous exercise (like a brisk walk): 3  On average, minutes per day of exercise at this level: 40  How intense was your typical exercise? : Light (like stretching or slow walking)  Exercise Minutes per Week: 120  Barrier to exercise: Time  Meals include: Breakfast, Lunch, Dinner  Beverages: Water, Tea, Soda  Cultural/Advent diet restrictions?: No    Healthy Coping:  Emotional response to diabetes: Acceptance, Anger, Fear/Anxiety, Concern for health and well-being  Informal Support system:: Spouse, Family  Stage of change: ACTION (Actively working towards change)    Current Management:  Difficulty affording diabetes management supplies?: No    ASSESSMENT:  Reviewed diabetes guidelines.  Emphasized the importance of consuming regular meals and snacks (eating every 2-3 hours), increasing fiber in diet for improved post-prandial glucose levels and increased label reading for total carbohydrates.      Reviewed meal plan for patient to help her with quantities, glycemic control and to decrease risk of ketone production.  Discussed carbohydrate sources and impact on blood glucose. Reviewed basics of healthy eating and incorporating a variety of  foods into meal plan.     Instructed on carbohydrate counting and label reading, see recommendations below.   Discussed importance of not going too low in CHO since that may cause liver to produce excess glucose and contribute to elevated blood glucose readings and ketone formation.     Encouraged eating breakfast within 1 hour of waking.  To also help prevent against ketone formation, protein was encouraged with meals and snacks, especially with the night snack. Reviewed benefits of exercising to help lower blood glucose and encouraged 30 minutes a day as tolerated and per MD approval, and to target exercise after meals if feel consumed too many CHO or having problem with BG being elevated after a meal.  Pt verbalized understanding of concepts discussed and recommendations provided.    BG meter training given and patient return demonstration without difficulty.   Patient will begin checking her BG 4 times daily.   Blood Glucose Target Ranges (mg/dL):  Fasting 60-95, pre-meal 60-95, 1 hour pp less than 130, 2 hour pp less than 120.   Premeal BG today 90 mg/dL.    Changes to patient's medications include None.      INTERVENTION:  Patient was instructed on Accu-Chek Yara meter and was able to provide an accurate return demonstration. Patient's blood glucose reading today was 90 mg/dL.    Educational topics covered today:  GDM diagnosis, pathophysiology, Risks and Complications of GDM, Means of controlling GDM, Using a Blood Glucose Monitor, Blood Glucose Goals, Logging and Interpreting Glucose Results, Ketone Testing, When to Call a Diabetes Educator or OB Provider, Healthy Eating During Pregnancy, Counting Carbohydrates, Meal Planning for GDM, and Physical Activity    Educational materials provided today:   [facility] Gestational Diabetes booklet, Tips for SMBG, My Food Plan, BG log sheets     Pt verbalized understanding of concepts discussed and recommendations provided today.     PLAN:  Check glucose 4 times daily,  before breakfast and 2 hour after each meal.     Physical activity recommended:  Walking, per PCP, as tolerated.    Meal plan: 2 carbs at breakfast, 3 carbs at lunch, 3 carbs at supper, 1 - 2 carbs at 3 snacks a day.  Follow consistent CHO meal plan, eat CHO and protein/fat at all meals/snacks.    Call/e-mail/MyChart message diabetes educator if 3 or more blood sugars are above the goal in 1 week, if ketones are positive, or with questions/concerns.    Bev Sanz RN, BSN, CDE  6/5/2019 5:28 PM   Time Spent: 60 minutes  Encounter Type: Individual    Any diabetes medication dose changes were made via the CDE Protocol and Collaborative Practice Agreement with the patient's OB/GYN provider. A copy of this encounter was shared with the provider.

## 2019-06-05 NOTE — PATIENT INSTRUCTIONS
Gestational Diabetes Guidelines      It is important to consume regular meals and snacks (eating every 2-3 hours), increasing fiber in your diet for improved post-meal glucose levels and increasing label reading for total carbohydrates.      Your meal plan is designed to help you with quantities, glycemic control and to decrease risk of ketone production.       MEAL PLAN:  Plan Breakfast Snack Lunch Snack Dinner Snack   Carb choices 2 1 3 1 3 1 - 2       SUMMARY FOR TODAY'S VISIT:         1.  Follow meal plan as discussed.         2.  Continue current activity, as tolerated.         3.  Begin testing Blood Glucose(BG) 4 x daily:  Fasting and 2-hours after each meal.  (BG target:  FBG/Pre-meal 60 - 95 and 2-hr PP less than 120)          4.  Continue regular contact with diabetes educator, as needed.         5.  Follow up with O.B.Provider on a regular basis.        Bev Sanz RN, BSN, CDE  6/5/2019 5:16 PM

## 2019-06-10 RX ORDER — BLOOD-GLUCOSE METER
EACH MISCELLANEOUS
Refills: 0 | COMMUNITY
Start: 2019-05-29 | End: 2019-08-09

## 2019-06-11 ENCOUNTER — PRENATAL OFFICE VISIT (OUTPATIENT)
Dept: FAMILY MEDICINE | Facility: OTHER | Age: 29
End: 2019-06-11
Attending: FAMILY MEDICINE
Payer: COMMERCIAL

## 2019-06-11 VITALS
DIASTOLIC BLOOD PRESSURE: 80 MMHG | BODY MASS INDEX: 38.24 KG/M2 | HEIGHT: 64 IN | RESPIRATION RATE: 20 BRPM | WEIGHT: 224 LBS | HEART RATE: 74 BPM | SYSTOLIC BLOOD PRESSURE: 126 MMHG | TEMPERATURE: 97.7 F

## 2019-06-11 DIAGNOSIS — Z3A.27 27 WEEKS GESTATION OF PREGNANCY: ICD-10-CM

## 2019-06-11 DIAGNOSIS — E03.9 HYPOTHYROIDISM, UNSPECIFIED TYPE: ICD-10-CM

## 2019-06-11 DIAGNOSIS — O24.419 GESTATIONAL DIABETES MELLITUS (GDM) IN THIRD TRIMESTER, GESTATIONAL DIABETES METHOD OF CONTROL UNSPECIFIED: Primary | ICD-10-CM

## 2019-06-11 LAB
ALBUMIN UR-MCNC: NEGATIVE MG/DL
APPEARANCE UR: CLEAR
BILIRUB UR QL STRIP: NEGATIVE
COLOR UR AUTO: YELLOW
GLUCOSE SERPL-MCNC: 88 MG/DL (ref 70–105)
GLUCOSE UR STRIP-MCNC: NEGATIVE MG/DL
HGB UR QL STRIP: NEGATIVE
KETONES UR STRIP-MCNC: NEGATIVE MG/DL
LEUKOCYTE ESTERASE UR QL STRIP: NEGATIVE
NITRATE UR QL: NEGATIVE
PH UR STRIP: 6.5 PH (ref 5–9)
SOURCE: NORMAL
SP GR UR STRIP: 1.02 (ref 1–1.03)
TSH SERPL DL<=0.05 MIU/L-ACNC: 1.45 IU/ML (ref 0.34–5.6)
UROBILINOGEN UR STRIP-ACNC: 0.2 EU/DL (ref 0.2–1)

## 2019-06-11 PROCEDURE — 84443 ASSAY THYROID STIM HORMONE: CPT | Mod: ZL | Performed by: FAMILY MEDICINE

## 2019-06-11 PROCEDURE — 82947 ASSAY GLUCOSE BLOOD QUANT: CPT | Mod: ZL | Performed by: FAMILY MEDICINE

## 2019-06-11 PROCEDURE — 36415 COLL VENOUS BLD VENIPUNCTURE: CPT | Mod: ZL | Performed by: FAMILY MEDICINE

## 2019-06-11 PROCEDURE — 99207 ZZC OB VISIT-NO CHARGE - GICH ONLY: CPT | Performed by: FAMILY MEDICINE

## 2019-06-11 PROCEDURE — 00000219 ZZHCL STATISTIC OBSA - URINALYSIS: Mod: ZL | Performed by: FAMILY MEDICINE

## 2019-06-11 PROCEDURE — 81003 URINALYSIS AUTO W/O SCOPE: CPT | Mod: ZL | Performed by: FAMILY MEDICINE

## 2019-06-11 ASSESSMENT — PAIN SCALES - GENERAL: PAINLEVEL: NO PAIN (0)

## 2019-06-11 ASSESSMENT — MIFFLIN-ST. JEOR: SCORE: 1726.06

## 2019-06-11 NOTE — PROGRESS NOTES
Recently diagnosed with GDM.  Has seen Diabetic Education last week.  Blood sugars as follows:    Fastin-104  2 hours after breakfast:    2 hours after lunch:    2 hours after supper:  114-142    Baby is active.  No bleeding, fluid leaking or contractions.  Will reassess blood sugars again in 2 weeks at her next visit.  Only just saw Diabetic Education last week and is still figuring out how to eat to improve her sugars.  If significantly elevated at next visit, consider starting oral agent.  Ordered weekly BPPs and.  Also will plan to check growth ultrasounds at 32 and 36 weeks. TSH and random glucose today.  Follow up in 2 weeks.  Liza Ordoñez MD on 2019 at 7:01 PM

## 2019-06-11 NOTE — NURSING NOTE
"Chief Complaint   Patient presents with     Prenatal Care     31wk6d       Initial /80 (BP Location: Right arm, Patient Position: Sitting, Cuff Size: Adult Large)   Pulse 74   Temp 97.7  F (36.5  C) (Tympanic)   Resp 20   Ht 1.626 m (5' 4\")   Wt 101.6 kg (224 lb)   LMP 10/31/2018 (Exact Date)   BMI 38.45 kg/m   Estimated body mass index is 38.45 kg/m  as calculated from the following:    Height as of this encounter: 1.626 m (5' 4\").    Weight as of this encounter: 101.6 kg (224 lb).  Medication Reconciliation: complete    Bri Garcia LPN  "

## 2019-06-16 ENCOUNTER — MYC MEDICAL ADVICE (OUTPATIENT)
Dept: FAMILY MEDICINE | Facility: OTHER | Age: 29
End: 2019-06-16

## 2019-06-16 DIAGNOSIS — Z01.84 IMMUNITY STATUS TESTING: Primary | ICD-10-CM

## 2019-06-17 ENCOUNTER — MYC MEDICAL ADVICE (OUTPATIENT)
Dept: FAMILY MEDICINE | Facility: OTHER | Age: 29
End: 2019-06-17

## 2019-06-17 DIAGNOSIS — Z01.84 IMMUNITY STATUS TESTING: ICD-10-CM

## 2019-06-17 PROBLEM — J30.2 CHRONIC SEASONAL ALLERGIC RHINITIS: Status: ACTIVE | Noted: 2018-08-10

## 2019-06-17 PROCEDURE — 36415 COLL VENOUS BLD VENIPUNCTURE: CPT | Performed by: FAMILY MEDICINE

## 2019-06-17 PROCEDURE — 86787 VARICELLA-ZOSTER ANTIBODY: CPT | Performed by: FAMILY MEDICINE

## 2019-06-17 NOTE — TELEPHONE ENCOUNTER
Per miic, patient have varivax 1 of 2 on 3/25/03. Patient is 32w5d.  Rosalina Funk LPN .............6/17/2019     8:31 AM

## 2019-06-18 ENCOUNTER — HOSPITAL ENCOUNTER (OUTPATIENT)
Dept: ULTRASOUND IMAGING | Facility: OTHER | Age: 29
Discharge: HOME | End: 2019-06-18
Attending: FAMILY MEDICINE | Admitting: FAMILY MEDICINE
Payer: COMMERCIAL

## 2019-06-18 DIAGNOSIS — O24.419 GESTATIONAL DIABETES MELLITUS (GDM) IN THIRD TRIMESTER, GESTATIONAL DIABETES METHOD OF CONTROL UNSPECIFIED: ICD-10-CM

## 2019-06-18 PROCEDURE — 76819 FETAL BIOPHYS PROFIL W/O NST: CPT

## 2019-06-19 LAB — VZV IGG SER QL IA: 0.2 AI (ref 0–0.8)

## 2019-06-20 ENCOUNTER — MYC MEDICAL ADVICE (OUTPATIENT)
Dept: FAMILY MEDICINE | Facility: OTHER | Age: 29
End: 2019-06-20

## 2019-06-20 ENCOUNTER — TELEPHONE (OUTPATIENT)
Dept: FAMILY MEDICINE | Facility: OTHER | Age: 29
End: 2019-06-20

## 2019-06-20 NOTE — TELEPHONE ENCOUNTER
Talked to patient and she did get an answer to her question . Bri Garcia LPN ....................6/20/2019  4:00 PM

## 2019-06-25 ENCOUNTER — PRENATAL OFFICE VISIT (OUTPATIENT)
Dept: FAMILY MEDICINE | Facility: OTHER | Age: 29
End: 2019-06-25
Attending: FAMILY MEDICINE
Payer: COMMERCIAL

## 2019-06-25 ENCOUNTER — HOSPITAL ENCOUNTER (OUTPATIENT)
Dept: ULTRASOUND IMAGING | Facility: OTHER | Age: 29
Discharge: HOME | End: 2019-06-25
Attending: FAMILY MEDICINE | Admitting: FAMILY MEDICINE
Payer: COMMERCIAL

## 2019-06-25 VITALS
OXYGEN SATURATION: 97 % | HEIGHT: 64 IN | HEART RATE: 77 BPM | RESPIRATION RATE: 20 BRPM | DIASTOLIC BLOOD PRESSURE: 80 MMHG | BODY MASS INDEX: 37.73 KG/M2 | WEIGHT: 221 LBS | SYSTOLIC BLOOD PRESSURE: 120 MMHG | TEMPERATURE: 98.1 F

## 2019-06-25 DIAGNOSIS — O24.419 GESTATIONAL DIABETES MELLITUS (GDM) IN THIRD TRIMESTER, GESTATIONAL DIABETES METHOD OF CONTROL UNSPECIFIED: ICD-10-CM

## 2019-06-25 DIAGNOSIS — Z3A.27 27 WEEKS GESTATION OF PREGNANCY: ICD-10-CM

## 2019-06-25 LAB
ALBUMIN UR-MCNC: NEGATIVE MG/DL
APPEARANCE UR: NORMAL
BILIRUB UR QL STRIP: NEGATIVE
COLOR UR AUTO: YELLOW
GLUCOSE UR STRIP-MCNC: NEGATIVE MG/DL
HGB UR QL STRIP: NEGATIVE
KETONES UR STRIP-MCNC: NEGATIVE MG/DL
LEUKOCYTE ESTERASE UR QL STRIP: NEGATIVE
NITRATE UR QL: NEGATIVE
PH UR STRIP: 6 PH (ref 5–9)
SOURCE: NORMAL
SP GR UR STRIP: 1.01 (ref 1–1.03)
UROBILINOGEN UR STRIP-ACNC: 0.2 EU/DL (ref 0.2–1)

## 2019-06-25 PROCEDURE — 76819 FETAL BIOPHYS PROFIL W/O NST: CPT

## 2019-06-25 PROCEDURE — 81003 URINALYSIS AUTO W/O SCOPE: CPT | Mod: ZL | Performed by: FAMILY MEDICINE

## 2019-06-25 PROCEDURE — 99207 ZZC OB VISIT-NO CHARGE - GICH ONLY: CPT | Performed by: FAMILY MEDICINE

## 2019-06-25 ASSESSMENT — PAIN SCALES - GENERAL: PAINLEVEL: NO PAIN (0)

## 2019-06-25 ASSESSMENT — MIFFLIN-ST. JEOR: SCORE: 1712.45

## 2019-06-25 ASSESSMENT — PATIENT HEALTH QUESTIONNAIRE - PHQ9: SUM OF ALL RESPONSES TO PHQ QUESTIONS 1-9: 0

## 2019-06-25 NOTE — PROGRESS NOTES
Fastin-102 (4/11 were over 95)  2 hours after breakfast:   (3/10 were over 120)  2 hours after lunch:   (2/9 were over 120)  2 hours after supper:   (4/11 were over 120)    Baby still is active.  No bleeding or fluid leaking.  No contractions.  Reviewed blood sugars as above.  Since she has less than one third of her blood sugars that are above goal, will continue with dietary management for the time being.  Reassess again in 2 weeks.  Continue with weekly BPPs.  Last week's ultrasound showed EFW of 62% with BIOPHYSICAL PROFILE of .  Has another Biophysical Profile appointment following this visit today. Follow up with me again in 2 weeks.  Liza Ordoñez MD on 2019 at 6:29 PM

## 2019-06-25 NOTE — NURSING NOTE
"Chief Complaint   Patient presents with     Prenatal Care     33wk6d       Initial /80 (BP Location: Right arm, Patient Position: Sitting, Cuff Size: Adult Large)   Pulse 77   Temp 98.1  F (36.7  C) (Tympanic)   Resp 20   Ht 1.626 m (5' 4\")   Wt 100.2 kg (221 lb)   LMP 10/31/2018 (Exact Date)   SpO2 97%   BMI 37.93 kg/m   Estimated body mass index is 37.93 kg/m  as calculated from the following:    Height as of this encounter: 1.626 m (5' 4\").    Weight as of this encounter: 100.2 kg (221 lb).  Medication Reconciliation: complete    Bri Garcia LPN  "

## 2019-07-02 ENCOUNTER — HOSPITAL ENCOUNTER (OUTPATIENT)
Dept: ULTRASOUND IMAGING | Facility: OTHER | Age: 29
Discharge: HOME | End: 2019-07-02
Attending: FAMILY MEDICINE | Admitting: FAMILY MEDICINE
Payer: COMMERCIAL

## 2019-07-02 DIAGNOSIS — O24.419 GESTATIONAL DIABETES MELLITUS (GDM) IN THIRD TRIMESTER, GESTATIONAL DIABETES METHOD OF CONTROL UNSPECIFIED: ICD-10-CM

## 2019-07-02 PROCEDURE — 76819 FETAL BIOPHYS PROFIL W/O NST: CPT

## 2019-07-09 ENCOUNTER — HOSPITAL ENCOUNTER (OUTPATIENT)
Dept: ULTRASOUND IMAGING | Facility: OTHER | Age: 29
Discharge: HOME | End: 2019-07-09
Attending: FAMILY MEDICINE | Admitting: FAMILY MEDICINE
Payer: COMMERCIAL

## 2019-07-09 ENCOUNTER — PRENATAL OFFICE VISIT (OUTPATIENT)
Dept: FAMILY MEDICINE | Facility: OTHER | Age: 29
End: 2019-07-09
Attending: FAMILY MEDICINE
Payer: COMMERCIAL

## 2019-07-09 VITALS
HEIGHT: 64 IN | RESPIRATION RATE: 18 BRPM | SYSTOLIC BLOOD PRESSURE: 120 MMHG | TEMPERATURE: 99 F | DIASTOLIC BLOOD PRESSURE: 60 MMHG | HEART RATE: 81 BPM | WEIGHT: 218 LBS | BODY MASS INDEX: 37.22 KG/M2

## 2019-07-09 DIAGNOSIS — O24.419 GESTATIONAL DIABETES MELLITUS (GDM) IN THIRD TRIMESTER, GESTATIONAL DIABETES METHOD OF CONTROL UNSPECIFIED: ICD-10-CM

## 2019-07-09 DIAGNOSIS — Z3A.27 27 WEEKS GESTATION OF PREGNANCY: ICD-10-CM

## 2019-07-09 DIAGNOSIS — Z3A.35 35 WEEKS GESTATION OF PREGNANCY: Primary | ICD-10-CM

## 2019-07-09 DIAGNOSIS — E03.9 HYPOTHYROIDISM, UNSPECIFIED TYPE: ICD-10-CM

## 2019-07-09 LAB
ALBUMIN UR-MCNC: NEGATIVE MG/DL
APPEARANCE UR: CLEAR
BACTERIA #/AREA URNS HPF: ABNORMAL /HPF
BILIRUB UR QL STRIP: ABNORMAL
COLOR UR AUTO: YELLOW
GLUCOSE UR STRIP-MCNC: NEGATIVE MG/DL
HGB BLD-MCNC: 12.3 G/DL (ref 11.7–15.7)
HGB UR QL STRIP: NEGATIVE
KETONES UR STRIP-MCNC: >80 MG/DL
LEUKOCYTE ESTERASE UR QL STRIP: NEGATIVE
NITRATE UR QL: NEGATIVE
NON-SQ EPI CELLS #/AREA URNS LPF: ABNORMAL /LPF
PH UR STRIP: 5.5 PH (ref 5–9)
RBC #/AREA URNS AUTO: ABNORMAL /HPF
SOURCE: ABNORMAL
SP GR UR STRIP: 1.02 (ref 1–1.03)
TSH SERPL DL<=0.05 MIU/L-ACNC: 1.45 IU/ML (ref 0.34–5.6)
UROBILINOGEN UR STRIP-ACNC: 0.2 EU/DL (ref 0.2–1)
WBC #/AREA URNS AUTO: ABNORMAL /HPF

## 2019-07-09 PROCEDURE — 81001 URINALYSIS AUTO W/SCOPE: CPT | Mod: ZL | Performed by: FAMILY MEDICINE

## 2019-07-09 PROCEDURE — 87081 CULTURE SCREEN ONLY: CPT | Mod: ZL | Performed by: FAMILY MEDICINE

## 2019-07-09 PROCEDURE — 99207 ZZC OB VISIT-NO CHARGE - GICH ONLY: CPT | Performed by: FAMILY MEDICINE

## 2019-07-09 PROCEDURE — 84443 ASSAY THYROID STIM HORMONE: CPT | Mod: ZL | Performed by: FAMILY MEDICINE

## 2019-07-09 PROCEDURE — 36415 COLL VENOUS BLD VENIPUNCTURE: CPT | Mod: ZL | Performed by: FAMILY MEDICINE

## 2019-07-09 PROCEDURE — 85018 HEMOGLOBIN: CPT | Mod: ZL | Performed by: FAMILY MEDICINE

## 2019-07-09 PROCEDURE — 76819 FETAL BIOPHYS PROFIL W/O NST: CPT

## 2019-07-09 ASSESSMENT — PAIN SCALES - GENERAL: PAINLEVEL: NO PAIN (0)

## 2019-07-09 ASSESSMENT — MIFFLIN-ST. JEOR: SCORE: 1698.84

## 2019-07-09 NOTE — PROGRESS NOTES
S)  Feeling well since last visit.  Blood sugars have been as follows in the past 2 weeks:    Fastin-97 (14 over 95)  2 hours after breakfast:  (112 over 120)  2 hours after lunch:    2 hours after supper:   (313 over 120)    No bleeding or fluid leaking.  No contractions.  Biophysical Profile  today.  EFW 55%.  Hemoglobin 11.6 today.  TSH normal as well.        (Z3A.35) 35 weeks gestation of pregnancy  (primary encounter diagnosis)  Comment:   Blood type:  O positive, Rubella Immune, treponema pallidum negative, hepatitis B negative, HIV negative.  Varicella non-immune (will need vaccination after pregnancy).  Quad screen declined.  Plan: Hemoglobin, Rectal/Vag Group B Strep Culture,         US OB > 14 Weeks, CANCELED: US OB > 14 Weeks        Follow up with Dr. David next week for prenatal visit.  Discussed with patient and  would recommend adding NST to Biophysical Profiles next week and will plan to do these weekly until delivery. Tentatively plan on induction 19 (at 39 1/7 weeks) due to GDM.      (O24.419) Gestational diabetes mellitus (GDM) in third trimester, gestational diabetes method of control unspecified  Comment: good blood sugar control with the majority of her readings in goal range.  Continue with dietary management.  Plan: US OB > 14 Weeks, CANCELED: US OB > 14 Weeks        Continue diet control.  Normal fetal growth on ultrasound.  Patient has lost weight during this pregnancy, likely related to stringent dietary control.      (E03.9) Hypothyroidism, unspecified type  Comment: TSH normal today.  Plan: TSH Reflex GH        Continue current Levothyroxine dose.    Liza Ordoñez MD on 2019 at 7:02 PM

## 2019-07-09 NOTE — NURSING NOTE
"Chief Complaint   Patient presents with     Prenatal Care     35wk6d       Initial /60 (BP Location: Right arm, Patient Position: Sitting, Cuff Size: Adult Large)   Pulse 81   Temp 99  F (37.2  C) (Tympanic)   Resp 18   Ht 1.626 m (5' 4\")   Wt 98.9 kg (218 lb)   LMP 10/31/2018 (Exact Date)   BMI 37.42 kg/m   Estimated body mass index is 37.42 kg/m  as calculated from the following:    Height as of this encounter: 1.626 m (5' 4\").    Weight as of this encounter: 98.9 kg (218 lb).  Medication Reconciliation: complete    Bri Garcia LPN  "

## 2019-07-09 NOTE — Clinical Note
FYI - this patient will be seeing you on 7/19/19.  Plan to start weekly NSTs at 36 weeks.  Also will have Biophysical Profile next week (scheduled).  Thank you!!

## 2019-07-11 ENCOUNTER — MYC MEDICAL ADVICE (OUTPATIENT)
Dept: FAMILY MEDICINE | Facility: OTHER | Age: 29
End: 2019-07-11

## 2019-07-11 NOTE — TELEPHONE ENCOUNTER
Patient had a UA done on 7/9/19 she has small amount of bilirubin and >80 ketones.    She is wanting to know what she should do to get rid of this.  Neli Sanz LPN........................7/11/2019  2:05 PM

## 2019-07-12 LAB
BACTERIA SPEC CULT: NORMAL
SPECIMEN SOURCE: NORMAL

## 2019-07-16 ENCOUNTER — HOSPITAL ENCOUNTER (OUTPATIENT)
Dept: ULTRASOUND IMAGING | Facility: OTHER | Age: 29
Discharge: HOME | End: 2019-07-16
Attending: FAMILY MEDICINE | Admitting: FAMILY MEDICINE
Payer: COMMERCIAL

## 2019-07-16 DIAGNOSIS — O24.419 GESTATIONAL DIABETES MELLITUS (GDM) IN THIRD TRIMESTER, GESTATIONAL DIABETES METHOD OF CONTROL UNSPECIFIED: ICD-10-CM

## 2019-07-16 PROCEDURE — 76819 FETAL BIOPHYS PROFIL W/O NST: CPT

## 2019-07-19 ENCOUNTER — PRENATAL OFFICE VISIT (OUTPATIENT)
Dept: FAMILY MEDICINE | Facility: OTHER | Age: 29
End: 2019-07-19
Attending: FAMILY MEDICINE
Payer: COMMERCIAL

## 2019-07-19 VITALS
OXYGEN SATURATION: 97 % | DIASTOLIC BLOOD PRESSURE: 80 MMHG | HEIGHT: 64 IN | TEMPERATURE: 98.1 F | WEIGHT: 218 LBS | SYSTOLIC BLOOD PRESSURE: 124 MMHG | HEART RATE: 71 BPM | BODY MASS INDEX: 37.22 KG/M2 | RESPIRATION RATE: 20 BRPM

## 2019-07-19 DIAGNOSIS — O24.419 GESTATIONAL DIABETES MELLITUS (GDM) IN THIRD TRIMESTER, GESTATIONAL DIABETES METHOD OF CONTROL UNSPECIFIED: ICD-10-CM

## 2019-07-19 DIAGNOSIS — Z3A.37 37 WEEKS GESTATION OF PREGNANCY: Primary | ICD-10-CM

## 2019-07-19 PROCEDURE — 59025 FETAL NON-STRESS TEST: CPT | Performed by: FAMILY MEDICINE

## 2019-07-19 PROCEDURE — 99207 ZZC OB VISIT-NO CHARGE - GICH ONLY: CPT | Performed by: FAMILY MEDICINE

## 2019-07-19 ASSESSMENT — PAIN SCALES - GENERAL: PAINLEVEL: NO PAIN (0)

## 2019-07-19 ASSESSMENT — PATIENT HEALTH QUESTIONNAIRE - PHQ9: SUM OF ALL RESPONSES TO PHQ QUESTIONS 1-9: 0

## 2019-07-19 ASSESSMENT — MIFFLIN-ST. JEOR: SCORE: 1698.84

## 2019-07-19 NOTE — NURSING NOTE
"Chief Complaint   Patient presents with     Prenatal Care     37wk2d       Initial /80 (BP Location: Right arm, Patient Position: Sitting, Cuff Size: Adult Large)   Pulse 71   Temp 98.1  F (36.7  C) (Tympanic)   Resp 20   Ht 1.626 m (5' 4\")   Wt 98.9 kg (218 lb)   LMP 10/31/2018 (Exact Date)   SpO2 97%   BMI 37.42 kg/m   Estimated body mass index is 37.42 kg/m  as calculated from the following:    Height as of this encounter: 1.626 m (5' 4\").    Weight as of this encounter: 98.9 kg (218 lb).  Medication Reconciliation: complete    Bri Garcia LPN  " 1. Continue CPAP 13 cm.  2. Clean equipment weekly and replace supplies as allowed by insurance.

## 2019-07-19 NOTE — PROGRESS NOTES
"Routine OB Visit    S: Doing well.  Answered some questions re: delivery.  Planning epidural.  + FM.  No bleeding, discharge, fluid leaking.    O: /80 (BP Location: Right arm, Patient Position: Sitting, Cuff Size: Adult Large)   Pulse 71   Temp 98.1  F (36.7  C) (Tympanic)   Resp 20   Ht 1.626 m (5' 4\")   Wt 98.9 kg (218 lb)   LMP 10/31/2018 (Exact Date)   SpO2 97%   BMI 37.42 kg/m    Gen: Well-appearing, NAD  FH: 37  FHR: 138  Ext: No edema  Skin: No rash    A/P:  Amanda Mckeon is a 28 year old  at 37w2d by 7w2d US, here for return OB visit.  GDM, diet controlled.  NST today, reactive.  Had normal BPP 3 days ago, normal.    PNC: - Prenatal labs normal, Rh +, Rubella +, GCT - FAILED 3 hour glucose x4 readings.  Genetics: CF/SMA screening normal; [place] low probability  Imagin2019 BPP - normal  Immunizations: Tdap 2019  Anticipatory guidance discussed: epidural, planning on not doing any birth plan, likely mirena @ PPV, okay for all baby meds.  RTC 1 week - scheduled with Dr. Ordoñez.  Did not check cervix today, but would like to next visit.    Jesenia David, DO  Family Practice    "

## 2019-07-25 ENCOUNTER — HOSPITAL ENCOUNTER (OUTPATIENT)
Dept: ULTRASOUND IMAGING | Facility: OTHER | Age: 29
Discharge: HOME | End: 2019-07-25
Attending: FAMILY MEDICINE | Admitting: FAMILY MEDICINE
Payer: COMMERCIAL

## 2019-07-25 DIAGNOSIS — O24.419 GESTATIONAL DIABETES MELLITUS (GDM) IN THIRD TRIMESTER, GESTATIONAL DIABETES METHOD OF CONTROL UNSPECIFIED: ICD-10-CM

## 2019-07-25 PROCEDURE — 76819 FETAL BIOPHYS PROFIL W/O NST: CPT

## 2019-07-26 ENCOUNTER — HOSPITAL ENCOUNTER (OUTPATIENT)
Facility: OTHER | Age: 29
Discharge: HOME | End: 2019-07-26
Attending: FAMILY MEDICINE | Admitting: FAMILY MEDICINE
Payer: COMMERCIAL

## 2019-07-26 ENCOUNTER — PRENATAL OFFICE VISIT (OUTPATIENT)
Dept: FAMILY MEDICINE | Facility: OTHER | Age: 29
End: 2019-07-26
Attending: FAMILY MEDICINE
Payer: COMMERCIAL

## 2019-07-26 VITALS
DIASTOLIC BLOOD PRESSURE: 80 MMHG | TEMPERATURE: 98.3 F | WEIGHT: 218 LBS | HEIGHT: 64 IN | RESPIRATION RATE: 20 BRPM | HEART RATE: 74 BPM | SYSTOLIC BLOOD PRESSURE: 122 MMHG | BODY MASS INDEX: 37.22 KG/M2

## 2019-07-26 VITALS — HEART RATE: 71 BPM | DIASTOLIC BLOOD PRESSURE: 64 MMHG | SYSTOLIC BLOOD PRESSURE: 114 MMHG

## 2019-07-26 DIAGNOSIS — O24.419 GESTATIONAL DIABETES MELLITUS (GDM) IN THIRD TRIMESTER, GESTATIONAL DIABETES METHOD OF CONTROL UNSPECIFIED: Primary | ICD-10-CM

## 2019-07-26 DIAGNOSIS — Z3A.38 38 WEEKS GESTATION OF PREGNANCY: ICD-10-CM

## 2019-07-26 PROBLEM — Z36.89 ENCOUNTER FOR TRIAGE IN PREGNANT PATIENT: Status: ACTIVE | Noted: 2019-07-26

## 2019-07-26 LAB
ALBUMIN UR-MCNC: NEGATIVE MG/DL
APPEARANCE UR: CLEAR
BILIRUB UR QL STRIP: NEGATIVE
COLOR UR AUTO: YELLOW
GLUCOSE UR STRIP-MCNC: NEGATIVE MG/DL
HGB UR QL STRIP: NEGATIVE
KETONES UR STRIP-MCNC: NEGATIVE MG/DL
LEUKOCYTE ESTERASE UR QL STRIP: NEGATIVE
NITRATE UR QL: NEGATIVE
PH UR STRIP: 6 PH (ref 5–9)
SOURCE: NORMAL
SP GR UR STRIP: 1.02 (ref 1–1.03)
UROBILINOGEN UR STRIP-ACNC: 0.2 EU/DL (ref 0.2–1)

## 2019-07-26 PROCEDURE — 81003 URINALYSIS AUTO W/O SCOPE: CPT | Mod: ZL | Performed by: FAMILY MEDICINE

## 2019-07-26 PROCEDURE — 59025 FETAL NON-STRESS TEST: CPT | Performed by: FAMILY MEDICINE

## 2019-07-26 PROCEDURE — 99207 ZZC OB VISIT-NO CHARGE - GICH ONLY: CPT | Performed by: FAMILY MEDICINE

## 2019-07-26 PROCEDURE — G0463 HOSPITAL OUTPT CLINIC VISIT: HCPCS | Mod: 25

## 2019-07-26 RX ORDER — LIDOCAINE 40 MG/G
CREAM TOPICAL
Status: CANCELLED | OUTPATIENT
Start: 2019-07-26

## 2019-07-26 RX ORDER — METHYLERGONOVINE MALEATE 0.2 MG/ML
200 INJECTION INTRAVENOUS
Status: CANCELLED | OUTPATIENT
Start: 2019-07-26

## 2019-07-26 RX ORDER — NALOXONE HYDROCHLORIDE 0.4 MG/ML
.1-.4 INJECTION, SOLUTION INTRAMUSCULAR; INTRAVENOUS; SUBCUTANEOUS
Status: CANCELLED | OUTPATIENT
Start: 2019-07-26

## 2019-07-26 RX ORDER — OXYCODONE AND ACETAMINOPHEN 5; 325 MG/1; MG/1
1 TABLET ORAL
Status: CANCELLED | OUTPATIENT
Start: 2019-07-26

## 2019-07-26 RX ORDER — OXYTOCIN 10 [USP'U]/ML
10 INJECTION, SOLUTION INTRAMUSCULAR; INTRAVENOUS
Status: CANCELLED | OUTPATIENT
Start: 2019-07-26

## 2019-07-26 RX ORDER — MISOPROSTOL 100 UG/1
25 TABLET ORAL EVERY 4 HOURS PRN
Status: CANCELLED | OUTPATIENT
Start: 2019-07-26

## 2019-07-26 RX ORDER — ONDANSETRON 2 MG/ML
4 INJECTION INTRAMUSCULAR; INTRAVENOUS EVERY 6 HOURS PRN
Status: CANCELLED | OUTPATIENT
Start: 2019-07-26

## 2019-07-26 RX ORDER — ACETAMINOPHEN 325 MG/1
650 TABLET ORAL EVERY 4 HOURS PRN
Status: CANCELLED | OUTPATIENT
Start: 2019-07-26

## 2019-07-26 RX ORDER — SODIUM CHLORIDE, SODIUM LACTATE, POTASSIUM CHLORIDE, CALCIUM CHLORIDE 600; 310; 30; 20 MG/100ML; MG/100ML; MG/100ML; MG/100ML
INJECTION, SOLUTION INTRAVENOUS CONTINUOUS
Status: CANCELLED | OUTPATIENT
Start: 2019-07-26

## 2019-07-26 RX ORDER — IBUPROFEN 400 MG/1
800 TABLET, FILM COATED ORAL
Status: CANCELLED | OUTPATIENT
Start: 2019-07-26

## 2019-07-26 RX ORDER — CARBOPROST TROMETHAMINE 250 UG/ML
250 INJECTION, SOLUTION INTRAMUSCULAR
Status: CANCELLED | OUTPATIENT
Start: 2019-07-26

## 2019-07-26 ASSESSMENT — PATIENT HEALTH QUESTIONNAIRE - PHQ9: SUM OF ALL RESPONSES TO PHQ QUESTIONS 1-9: 0

## 2019-07-26 ASSESSMENT — PAIN SCALES - GENERAL: PAINLEVEL: NO PAIN (0)

## 2019-07-26 ASSESSMENT — MIFFLIN-ST. JEOR: SCORE: 1698.84

## 2019-07-26 NOTE — NURSING NOTE
"Chief Complaint   Patient presents with     Prenatal Care     38wk2d       Initial /80 (BP Location: Right arm, Patient Position: Sitting, Cuff Size: Adult Large)   Pulse 74   Temp 98.3  F (36.8  C) (Tympanic)   Resp 20   Ht 1.626 m (5' 4\")   Wt 98.9 kg (218 lb)   LMP 10/31/2018 (Exact Date)   BMI 37.42 kg/m   Estimated body mass index is 37.42 kg/m  as calculated from the following:    Height as of this encounter: 1.626 m (5' 4\").    Weight as of this encounter: 98.9 kg (218 lb).  Medication Reconciliation: complete    Bri Garcia LPN  "

## 2019-07-26 NOTE — PROGRESS NOTES
Admitted to room 402 from clinic per [facility] MD orders for   NST at 38.2 weeks gestation. FHM applied. Hydrating with water.

## 2019-07-26 NOTE — DISCHARGE INSTRUCTIONS
Appointment on Monday July 29 at 315 with Farooq Guerrero MD.  Return to [facility] for following-vaginal bleeding, leaking amniotic fluid, uterine contractions, decreased fetal movement.  Contact physician for questions and concerns.  Radiology will call and inform you regarding day and time of procedure.

## 2019-07-26 NOTE — PROGRESS NOTES
Discharged home with . Discharge instructions completed both written and verbal and questions answered. Discharged ambulatory.

## 2019-07-26 NOTE — PROGRESS NOTES
CCA MD here to see patient and view [facility] strip. Discharge instructions given at this time and answered patient questions.

## 2019-07-26 NOTE — PROGRESS NOTES
Fastin-100 (4/8 were at 95 or above)  2 hours after breakfast:    2 hours after lunch:   2 hours after supper:  119-161 (2/7 above 120)    Baby has still been active last evening.  Today was moving normally, more so this morning.  No contractions have been noted.  No bleeding or fluid leaking.  More mucousy discharge in the past few days.  Biophysical Profile yesterday was 6/8 (2 pts off for movement).      NST attempted in clinic, but could not keep baby on monitor.  Patient was sent over to [facility] for further monitoring.  Reactive NST was noted there on my review.  Will have her return to clinic on 19 for visit with me and repeat Biophysical Profile.  She was scheduled for induction on 19 due to GDM.  Liza Ordoñez MD on 2019 at 5:52 PM

## 2019-07-29 ENCOUNTER — PRENATAL OFFICE VISIT (OUTPATIENT)
Dept: FAMILY MEDICINE | Facility: OTHER | Age: 29
End: 2019-07-29
Attending: FAMILY MEDICINE
Payer: COMMERCIAL

## 2019-07-29 ENCOUNTER — HOSPITAL ENCOUNTER (OUTPATIENT)
Dept: ULTRASOUND IMAGING | Facility: OTHER | Age: 29
Discharge: HOME | End: 2019-07-29
Attending: FAMILY MEDICINE | Admitting: FAMILY MEDICINE
Payer: COMMERCIAL

## 2019-07-29 VITALS
RESPIRATION RATE: 20 BRPM | HEIGHT: 64 IN | BODY MASS INDEX: 36.88 KG/M2 | SYSTOLIC BLOOD PRESSURE: 120 MMHG | WEIGHT: 216 LBS | OXYGEN SATURATION: 97 % | HEART RATE: 88 BPM | DIASTOLIC BLOOD PRESSURE: 60 MMHG | TEMPERATURE: 98.2 F

## 2019-07-29 DIAGNOSIS — Z3A.38 38 WEEKS GESTATION OF PREGNANCY: Primary | ICD-10-CM

## 2019-07-29 DIAGNOSIS — O24.419 GESTATIONAL DIABETES MELLITUS (GDM) IN THIRD TRIMESTER, GESTATIONAL DIABETES METHOD OF CONTROL UNSPECIFIED: ICD-10-CM

## 2019-07-29 LAB
ALBUMIN UR-MCNC: NEGATIVE MG/DL
APPEARANCE UR: CLEAR
BILIRUB UR QL STRIP: NEGATIVE
COLOR UR AUTO: YELLOW
GLUCOSE UR STRIP-MCNC: NEGATIVE MG/DL
HGB UR QL STRIP: NEGATIVE
KETONES UR STRIP-MCNC: 15 MG/DL
LEUKOCYTE ESTERASE UR QL STRIP: NEGATIVE
NITRATE UR QL: NEGATIVE
PH UR STRIP: 5 PH (ref 5–9)
SOURCE: ABNORMAL
SP GR UR STRIP: 1.01 (ref 1–1.03)
UROBILINOGEN UR STRIP-ACNC: 0.2 EU/DL (ref 0.2–1)

## 2019-07-29 PROCEDURE — 81003 URINALYSIS AUTO W/O SCOPE: CPT | Mod: ZL | Performed by: FAMILY MEDICINE

## 2019-07-29 PROCEDURE — 76819 FETAL BIOPHYS PROFIL W/O NST: CPT

## 2019-07-29 PROCEDURE — 99207 ZZC OB VISIT-NO CHARGE - GICH ONLY: CPT | Performed by: FAMILY MEDICINE

## 2019-07-29 ASSESSMENT — MIFFLIN-ST. JEOR: SCORE: 1689.77

## 2019-07-29 ASSESSMENT — PAIN SCALES - GENERAL: PAINLEVEL: NO PAIN (0)

## 2019-07-29 ASSESSMENT — PATIENT HEALTH QUESTIONNAIRE - PHQ9: SUM OF ALL RESPONSES TO PHQ QUESTIONS 1-9: 0

## 2019-07-29 NOTE — NURSING NOTE
"Chief Complaint   Patient presents with     Prenatal Care     38wk5d       Initial /60 (BP Location: Right arm, Patient Position: Sitting, Cuff Size: Adult Large)   Pulse 88   Temp 98.2  F (36.8  C) (Tympanic)   Resp 20   Ht 1.626 m (5' 4\")   Wt 98 kg (216 lb)   LMP 10/31/2018 (Exact Date)   SpO2 97%   BMI 37.08 kg/m   Estimated body mass index is 37.08 kg/m  as calculated from the following:    Height as of this encounter: 1.626 m (5' 4\").    Weight as of this encounter: 98 kg (216 lb).  Medication Reconciliation: complete    Bri Garcia LPN  "

## 2019-07-29 NOTE — PROGRESS NOTES
Baby still moving today, a little less active yesterday.  No bleeding, fluid leaking or contractions.  Biophysical Profile  today.  Fastin-100  After breakfast   After lunch   After supper 100-102    Keep plans for induction on 19.  Membranes stripped today.  Follow up sooner if active labor.  Liza Ordoñez MD on 2019 at 5:17 PM

## 2019-07-30 ENCOUNTER — MYC MEDICAL ADVICE (OUTPATIENT)
Dept: FAMILY MEDICINE | Facility: OTHER | Age: 29
End: 2019-07-30

## 2019-08-02 ENCOUNTER — HOSPITAL ENCOUNTER (INPATIENT)
Facility: OTHER | Age: 29
LOS: 4 days | Discharge: HOME | End: 2019-08-06
Attending: FAMILY MEDICINE | Admitting: FAMILY MEDICINE
Payer: COMMERCIAL

## 2019-08-02 ENCOUNTER — ANESTHESIA (OUTPATIENT)
Dept: SURGERY | Facility: OTHER | Age: 29
End: 2019-08-02
Payer: COMMERCIAL

## 2019-08-02 ENCOUNTER — ANESTHESIA EVENT (OUTPATIENT)
Dept: SURGERY | Facility: OTHER | Age: 29
End: 2019-08-02
Payer: COMMERCIAL

## 2019-08-02 DIAGNOSIS — Z98.891 S/P CESAREAN SECTION: Primary | ICD-10-CM

## 2019-08-02 PROBLEM — Z34.90 PREGNANCY: Status: ACTIVE | Noted: 2019-08-02

## 2019-08-02 LAB
A1 MICROGLOB PLACENTAL VAG QL: POSITIVE
ABO + RH BLD: NORMAL
ABO + RH BLD: NORMAL
BASOPHILS # BLD AUTO: 0.1 10E9/L (ref 0–0.2)
BASOPHILS NFR BLD AUTO: 0.6 %
DIFFERENTIAL METHOD BLD: NORMAL
EOSINOPHIL # BLD AUTO: 0.1 10E9/L (ref 0–0.7)
EOSINOPHIL NFR BLD AUTO: 0.6 %
ERYTHROCYTE [DISTWIDTH] IN BLOOD BY AUTOMATED COUNT: 13.2 % (ref 10–15)
HCT VFR BLD AUTO: 36.8 % (ref 35–47)
HGB BLD-MCNC: 12.4 G/DL (ref 11.7–15.7)
IMM GRANULOCYTES # BLD: 0.1 10E9/L (ref 0–0.4)
IMM GRANULOCYTES NFR BLD: 1.4 %
LYMPHOCYTES # BLD AUTO: 1.5 10E9/L (ref 0.8–5.3)
LYMPHOCYTES NFR BLD AUTO: 17.7 %
MCH RBC QN AUTO: 29 PG (ref 26.5–33)
MCHC RBC AUTO-ENTMCNC: 33.7 G/DL (ref 31.5–36.5)
MCV RBC AUTO: 86 FL (ref 78–100)
MONOCYTES # BLD AUTO: 0.6 10E9/L (ref 0–1.3)
MONOCYTES NFR BLD AUTO: 7.4 %
NEUTROPHILS # BLD AUTO: 6.1 10E9/L (ref 1.6–8.3)
NEUTROPHILS NFR BLD AUTO: 72.3 %
PLATELET # BLD AUTO: 263 10E9/L (ref 150–450)
RBC # BLD AUTO: 4.27 10E12/L (ref 3.8–5.2)
SPECIMEN EXP DATE BLD: NORMAL
WBC # BLD AUTO: 8.5 10E9/L (ref 4–11)

## 2019-08-02 PROCEDURE — 86900 BLOOD TYPING SEROLOGIC ABO: CPT | Performed by: FAMILY MEDICINE

## 2019-08-02 PROCEDURE — 25000125 ZZHC RX 250: Performed by: FAMILY MEDICINE

## 2019-08-02 PROCEDURE — 36415 COLL VENOUS BLD VENIPUNCTURE: CPT | Performed by: FAMILY MEDICINE

## 2019-08-02 PROCEDURE — 85025 COMPLETE CBC W/AUTO DIFF WBC: CPT | Performed by: FAMILY MEDICINE

## 2019-08-02 PROCEDURE — 25000128 H RX IP 250 OP 636: Performed by: NURSE ANESTHETIST, CERTIFIED REGISTERED

## 2019-08-02 PROCEDURE — 86780 TREPONEMA PALLIDUM: CPT | Performed by: FAMILY MEDICINE

## 2019-08-02 PROCEDURE — 37000011 ZZH ANESTHESIA WARD SERVICE

## 2019-08-02 PROCEDURE — 25000125 ZZHC RX 250: Performed by: NURSE ANESTHETIST, CERTIFIED REGISTERED

## 2019-08-02 PROCEDURE — 3E0P7VZ INTRODUCTION OF HORMONE INTO FEMALE REPRODUCTIVE, VIA NATURAL OR ARTIFICIAL OPENING: ICD-10-PCS | Performed by: FAMILY MEDICINE

## 2019-08-02 PROCEDURE — 84112 EVAL AMNIOTIC FLUID PROTEIN: CPT | Performed by: FAMILY MEDICINE

## 2019-08-02 PROCEDURE — 12000000 ZZH R&B MED SURG/OB

## 2019-08-02 PROCEDURE — 86901 BLOOD TYPING SEROLOGIC RH(D): CPT | Performed by: FAMILY MEDICINE

## 2019-08-02 PROCEDURE — 25000132 ZZH RX MED GY IP 250 OP 250 PS 637: Performed by: FAMILY MEDICINE

## 2019-08-02 PROCEDURE — 25800030 ZZH RX IP 258 OP 636: Performed by: FAMILY MEDICINE

## 2019-08-02 RX ORDER — LIDOCAINE 40 MG/G
CREAM TOPICAL
Status: DISCONTINUED | OUTPATIENT
Start: 2019-08-02 | End: 2019-08-03

## 2019-08-02 RX ORDER — MISOPROSTOL 100 UG/1
25 TABLET ORAL EVERY 4 HOURS PRN
Status: DISCONTINUED | OUTPATIENT
Start: 2019-08-02 | End: 2019-08-02

## 2019-08-02 RX ORDER — CARBOPROST TROMETHAMINE 250 UG/ML
250 INJECTION, SOLUTION INTRAMUSCULAR
Status: DISCONTINUED | OUTPATIENT
Start: 2019-08-02 | End: 2019-08-03

## 2019-08-02 RX ORDER — ONDANSETRON 2 MG/ML
4 INJECTION INTRAMUSCULAR; INTRAVENOUS EVERY 6 HOURS PRN
Status: DISCONTINUED | OUTPATIENT
Start: 2019-08-02 | End: 2019-08-03

## 2019-08-02 RX ORDER — NALBUPHINE HYDROCHLORIDE 10 MG/ML
2.5-5 INJECTION, SOLUTION INTRAMUSCULAR; INTRAVENOUS; SUBCUTANEOUS EVERY 6 HOURS PRN
Status: DISCONTINUED | OUTPATIENT
Start: 2019-08-02 | End: 2019-08-03

## 2019-08-02 RX ORDER — NALOXONE HYDROCHLORIDE 0.4 MG/ML
.1-.4 INJECTION, SOLUTION INTRAMUSCULAR; INTRAVENOUS; SUBCUTANEOUS
Status: DISCONTINUED | OUTPATIENT
Start: 2019-08-02 | End: 2019-08-02

## 2019-08-02 RX ORDER — LIDOCAINE HYDROCHLORIDE 10 MG/ML
INJECTION, SOLUTION INFILTRATION; PERINEURAL PRN
Status: DISCONTINUED | OUTPATIENT
Start: 2019-08-02 | End: 2019-08-03

## 2019-08-02 RX ORDER — OXYCODONE AND ACETAMINOPHEN 5; 325 MG/1; MG/1
1 TABLET ORAL
Status: DISCONTINUED | OUTPATIENT
Start: 2019-08-02 | End: 2019-08-03

## 2019-08-02 RX ORDER — LIDOCAINE HYDROCHLORIDE AND EPINEPHRINE 15; 5 MG/ML; UG/ML
INJECTION, SOLUTION EPIDURAL PRN
Status: DISCONTINUED | OUTPATIENT
Start: 2019-08-02 | End: 2019-08-03

## 2019-08-02 RX ORDER — NALOXONE HYDROCHLORIDE 0.4 MG/ML
.1-.4 INJECTION, SOLUTION INTRAMUSCULAR; INTRAVENOUS; SUBCUTANEOUS
Status: DISCONTINUED | OUTPATIENT
Start: 2019-08-02 | End: 2019-08-03

## 2019-08-02 RX ORDER — ACETAMINOPHEN 325 MG/1
650 TABLET ORAL EVERY 4 HOURS PRN
Status: DISCONTINUED | OUTPATIENT
Start: 2019-08-02 | End: 2019-08-03

## 2019-08-02 RX ORDER — PHENYLEPHRINE HCL IN 0.9% NACL 1 MG/10 ML
100 SYRINGE (ML) INTRAVENOUS EVERY 5 MIN PRN
Status: COMPLETED | OUTPATIENT
Start: 2019-08-02 | End: 2019-08-03

## 2019-08-02 RX ORDER — OXYTOCIN 10 [USP'U]/ML
10 INJECTION, SOLUTION INTRAMUSCULAR; INTRAVENOUS
Status: COMPLETED | OUTPATIENT
Start: 2019-08-02 | End: 2019-08-03

## 2019-08-02 RX ORDER — SODIUM CHLORIDE, SODIUM LACTATE, POTASSIUM CHLORIDE, CALCIUM CHLORIDE 600; 310; 30; 20 MG/100ML; MG/100ML; MG/100ML; MG/100ML
INJECTION, SOLUTION INTRAVENOUS CONTINUOUS
Status: DISCONTINUED | OUTPATIENT
Start: 2019-08-02 | End: 2019-08-03

## 2019-08-02 RX ORDER — TERBUTALINE SULFATE 1 MG/ML
0.25 INJECTION, SOLUTION SUBCUTANEOUS
Status: DISCONTINUED | OUTPATIENT
Start: 2019-08-02 | End: 2019-08-03

## 2019-08-02 RX ORDER — IBUPROFEN 400 MG/1
800 TABLET, FILM COATED ORAL
Status: DISCONTINUED | OUTPATIENT
Start: 2019-08-02 | End: 2019-08-03

## 2019-08-02 RX ORDER — METHYLERGONOVINE MALEATE 0.2 MG/ML
200 INJECTION INTRAVENOUS
Status: DISCONTINUED | OUTPATIENT
Start: 2019-08-02 | End: 2019-08-03

## 2019-08-02 RX ORDER — ROPIVACAINE HYDROCHLORIDE 2 MG/ML
INJECTION, SOLUTION EPIDURAL; INFILTRATION; PERINEURAL PRN
Status: DISCONTINUED | OUTPATIENT
Start: 2019-08-02 | End: 2019-08-03

## 2019-08-02 RX ADMIN — ROPIVACAINE HYDROCHLORIDE 6 ML: 2 INJECTION, SOLUTION EPIDURAL; INFILTRATION; PERINEURAL at 23:33

## 2019-08-02 RX ADMIN — Medication 10 ML/HR: at 23:44

## 2019-08-02 RX ADMIN — SODIUM CHLORIDE, POTASSIUM CHLORIDE, SODIUM LACTATE AND CALCIUM CHLORIDE: 600; 310; 30; 20 INJECTION, SOLUTION INTRAVENOUS at 20:10

## 2019-08-02 RX ADMIN — MISOPROSTOL 25 MCG: 100 TABLET ORAL at 15:49

## 2019-08-02 RX ADMIN — ROPIVACAINE HYDROCHLORIDE 2 ML: 2 INJECTION, SOLUTION EPIDURAL; INFILTRATION; PERINEURAL at 23:44

## 2019-08-02 RX ADMIN — MISOPROSTOL 25 MCG: 100 TABLET ORAL at 06:43

## 2019-08-02 RX ADMIN — MISOPROSTOL 25 MCG: 100 TABLET ORAL at 11:10

## 2019-08-02 RX ADMIN — Medication 2 MILLI-UNITS/MIN: at 20:11

## 2019-08-02 RX ADMIN — LIDOCAINE HYDROCHLORIDE AND EPINEPHRINE 5 ML: 15; 5 INJECTION, SOLUTION EPIDURAL at 23:23

## 2019-08-02 RX ADMIN — LIDOCAINE HYDROCHLORIDE 40 MG: 10 INJECTION, SOLUTION INFILTRATION; PERINEURAL at 23:19

## 2019-08-02 ASSESSMENT — ACTIVITIES OF DAILY LIVING (ADL): FALL_HISTORY_WITHIN_LAST_SIX_MONTHS: NO

## 2019-08-02 NOTE — PROGRESS NOTES
Dr. SUAREZ notified of patients status. Plan of care discussed. Will place 2nd dose of Cytotec per Doctor order and continue to monitor.

## 2019-08-02 NOTE — PROGRESS NOTES
S)  Doing well.  Starting to feel a little crampy.  Had last dose of cytotec about 35 minutes ago.    O) per RN, cervix still 1 cm when cytotec dose was last placed.    FHTs category I  [place] - irregular contractions every 2-6 minutes.  A/P)  Continue with cytotec.  Artificial rupture of membranes when able.  Epidural for pain control when needed & labor established.  .Liza Ordoñez MD on 8/2/2019 at 12:03 PM

## 2019-08-02 NOTE — PROGRESS NOTES
Patient having an irregular contraction pattern. Cervix 1.5 50%  per Dr. SUAREZ, [facility]'s Category 1. #3 dose of Cytotec placed. Will continue to monitor.

## 2019-08-02 NOTE — PROGRESS NOTES
S) remains comfortable.  Only a little crampy.  No bloody show.  O) cervix 1.5/50/-3, ballotable.   FHTs remain category I.  [place] - irregular contractions every 2-6 minutes.  Glucose 60.  A/P) repeat dose of cytotec.  Discussed that if this dose does not produce active labor, would recommend stopping attempts at induction, discharge and try again another day.  Will continue to follow.  Liza Ordoñez MD on 8/2/2019 at 3:47 PM

## 2019-08-02 NOTE — H&P
[facility]    History and Physical  Obstetrics and Gynecology     Date of Admission:  2019    Liza Ordoñez    History of Present Illness   Amanda Mckeon is a 28 year old female  39w2d  Estimated Date of Delivery: Aug 7, 2019 is calculated from Patient's last menstrual period was 10/31/2018 (exact date). [facility]  for [facility] of [facility].  Indication diet controlled gestational diabetes.    HPI:  Amanda has had GDM this pregnancy, which has been diet controlled.  Other than the GDM, her pregnancy has been uncomplicated.  She also has hypothyroidism, her TSHs have been stable.  Last TSH was on 19.  Last growth ultrasound was on 19.  Baby was 55% for weight at that time (2673 grams).      PRENATAL COURSE  Prenatal course was complicated by GDM, hypothyroidism.      Recent Labs   Lab Test 19  0630   ABO O   RH Pos     Rhogam not indicated   Recent Labs   Lab Test 18  1206   HEPBANG Nonreactive   HIAGAB Nonreactive   RUQIGG 16     Treponema pallidum negative  VZV non-immune  GBS neg    Past Medical History    I have reviewed this patient's medical history and updated it with pertinent information if needed.   Past Medical History:   Diagnosis Date     Cyst of right ovary     2016     Herpesviral vesicular dermatitis     2016     Hypothyroidism     2015       Past Surgical History   I have reviewed this patient's surgical history and updated it with pertinent information if needed.  Past Surgical History:   Procedure Laterality Date     ADENOIDECTOMY      as a child     AS SHOULDER ARTHROSCOPY, DX Right     Dr. Bolivar - age 12     MAMMOPLASTY REDUCTION           PE TUBES      as a child       Prior to Admission Medications   Prior to Admission Medications   Prescriptions Last Dose Informant Patient Reported? Taking?   acyclovir (ZOVIRAX) 800 MG tablet More than a month at Unknown time  No No   Sig: Take 1 tablet (800 mg)  by mouth 5 times daily   alcohol swab prep pads Past Week at Unknown time  No Yes   Sig: Use to swab area of injection/martir as directed   blood glucose (ACCU-CHEK FASTCLIX) lancing device Past Week at Unknown time  No Yes   Sig: Device to be used with lancets.   blood glucose (NO BRAND SPECIFIED) test strip Past Week at Unknown time  No Yes   Sig: Use to test blood sugar 4 times daily or as directed. To accompany: Blood Glucose Monitor Brands: per insurance.   blood glucose calibration (NO BRAND SPECIFIED) solution Past Week at Unknown time  No Yes   Sig: Use to calibrate blood glucose monitor as needed as directed. To accompany: Blood Glucose Monitor Brands: per insurance.   blood glucose monitoring (ACCU-CHEK YESSICA PLUS) meter device kit Past Week at Unknown time  Yes Yes   Sig: USE TO TEST BLOOD SUGAR   fluticasone (FLONASE) 50 MCG/ACT spray More than a month at Unknown time  No No   Sig: Spray 2 sprays into both nostrils daily   folic acid (FOLVITE) 1 MG tablet 8/1/2019 at Unknown time  No Yes   Sig: Take 1 tablet (1 mg) by mouth daily   levothyroxine (SYNTHROID/LEVOTHROID) 100 MCG tablet 8/2/2019 at Unknown time  No Yes   Sig: Take 1 tablet (100 mcg) by mouth daily   thin (NO BRAND SPECIFIED) lancets Past Week at Unknown time  No Yes   Sig: Use to test blood sugar 4 times daily or as directed. To accompany: Blood Glucose Monitor Brands: per insurance.      Facility-Administered Medications: None     Allergies   Allergies   Allergen Reactions     Amoxicillin Hives     Childhood       Social History   I have reviewed this patient's social history and updated it with pertinent information if needed. Amanda PIAZ Mike  reports that she has never smoked. She has never used smokeless tobacco. She reports that she does not drink alcohol or use drugs.    Family History   I have reviewed this patient's family history and updated it with pertinent information if needed.   Family History   Problem Relation Age of Onset      Family History Negative Mother      Lupus Father      Family History Negative Brother      Kidney Disease Maternal Grandmother      Bladder Cancer Maternal Grandfather      Family History Negative Paternal Grandmother      Diabetes Paternal Grandfather        Immunization History   Immunization History   Administered Date(s) Administered     DTaP / Hep B / IPV 01/15/1990, 03/23/1990, 05/25/1990, 08/05/1991     HPV Quadrivalent 03/11/2008, 05/13/2008, 09/12/2008     Hep B, Peds or Adolescent 03/21/2002, 03/21/2002, 07/01/2002, 07/01/2002, 03/25/2003, 03/25/2003     HepA-Adult 11/27/2017, 08/10/2018     HepB, Unspecified 01/15/1990, 03/23/1990, 08/05/1991, 03/21/2002, 07/01/2002, 03/25/2003     MMR 04/26/1991, 03/21/2002     Meningococcal (Menactra ) 03/11/2008     Poliovirus, inactivated (IPV) 01/15/1990, 03/23/1990, 08/05/1991     TD (ADULT, 7+) 03/25/2003     TDAP Vaccine (Boostrix) 03/25/2003, 11/27/2017, 05/14/2019     Td (Adult), Adsorbed 03/25/2003     Varicella 03/25/2003       Physical Exam   Temp: 97.6  F (36.4  C) Temp src: Temporal BP: 121/78     Resp: 18 SpO2: 98 %      Vital Signs with Ranges  Temp:  [97.6  F (36.4  C)] 97.6  F (36.4  C)  Resp:  [18] 18  BP: (121)/(78) 121/78  SpO2:  [98 %] 98 %    Constitutional: alert and no distress   Respiratory: Clear to auscultation bilaterally.   Cardiovascular: Regular rate and rhythm. Normal S1S2.  No murmur, gallop or rub.  Abdomen: gravid, single vertex fetus, non-tender, EFW 8 lbs   Cervical Exam: 1 cm per RN when cytotec was placed this morning.  Skin/Extremites: no rashes and no lesions  Neurologic: CN II-XII grossly intact.  DTRs 2+ and symmetric at knees.  No clonus.  Neuropsychiatric: Affect: normal        Fetal Heart Tones: Category  I  TOCO:   frequency q 2-5 minutes      ASSESSMENT:     Assessment & Plan   Amanda Mckeon is a 28 year old female who presents for elective induction due to diet controlled GDM.  Also with hypothyroidism.  She is VZV  non-immune and will need booster after delivery.      IUP @ 39w2d for induction of labor.  Indication diet controlled GDM.  NST reactive.  Category  I    PLAN:   Admit - see IP orders  Anticipate   Labor induction with cytotec   Epidural for pain control when labor established    Liza Ordoñez MD

## 2019-08-03 PROBLEM — Z98.891 S/P CESAREAN SECTION: Status: ACTIVE | Noted: 2019-08-03

## 2019-08-03 LAB — T PALLIDUM AB SER QL: NONREACTIVE

## 2019-08-03 PROCEDURE — 25000125 ZZHC RX 250: Performed by: NURSE ANESTHETIST, CERTIFIED REGISTERED

## 2019-08-03 PROCEDURE — 25800030 ZZH RX IP 258 OP 636: Performed by: OBSTETRICS & GYNECOLOGY

## 2019-08-03 PROCEDURE — 25000132 ZZH RX MED GY IP 250 OP 250 PS 637: Performed by: OBSTETRICS & GYNECOLOGY

## 2019-08-03 PROCEDURE — 12000000 ZZH R&B MED SURG/OB

## 2019-08-03 PROCEDURE — 25000125 ZZHC RX 250: Performed by: OBSTETRICS & GYNECOLOGY

## 2019-08-03 PROCEDURE — 37000008 ZZH ANESTHESIA TECHNICAL FEE, 1ST 30 MIN: Performed by: OBSTETRICS & GYNECOLOGY

## 2019-08-03 PROCEDURE — 25000128 H RX IP 250 OP 636: Performed by: OBSTETRICS & GYNECOLOGY

## 2019-08-03 PROCEDURE — 25000128 H RX IP 250 OP 636: Performed by: NURSE ANESTHETIST, CERTIFIED REGISTERED

## 2019-08-03 PROCEDURE — 25000128 H RX IP 250 OP 636: Performed by: FAMILY MEDICINE

## 2019-08-03 PROCEDURE — 36000058 ZZH SURGERY LEVEL 3 EA 15 ADDTL MIN: Performed by: OBSTETRICS & GYNECOLOGY

## 2019-08-03 PROCEDURE — 25800030 ZZH RX IP 258 OP 636: Performed by: NURSE ANESTHETIST, CERTIFIED REGISTERED

## 2019-08-03 PROCEDURE — 27210794 ZZH OR GENERAL SUPPLY STERILE: Performed by: OBSTETRICS & GYNECOLOGY

## 2019-08-03 PROCEDURE — 25800030 ZZH RX IP 258 OP 636: Performed by: FAMILY MEDICINE

## 2019-08-03 PROCEDURE — 59510 CESAREAN DELIVERY: CPT | Performed by: NURSE ANESTHETIST, CERTIFIED REGISTERED

## 2019-08-03 PROCEDURE — 40000275 ZZH STATISTIC RCP TIME EA 10 MIN

## 2019-08-03 PROCEDURE — 71000014 ZZH RECOVERY PHASE 1 LEVEL 2 FIRST HR: Performed by: OBSTETRICS & GYNECOLOGY

## 2019-08-03 PROCEDURE — 59510 CESAREAN DELIVERY: CPT | Performed by: OBSTETRICS & GYNECOLOGY

## 2019-08-03 PROCEDURE — 36000056 ZZH SURGERY LEVEL 3 1ST 30 MIN: Performed by: OBSTETRICS & GYNECOLOGY

## 2019-08-03 PROCEDURE — 37000009 ZZH ANESTHESIA TECHNICAL FEE, EACH ADDTL 15 MIN: Performed by: OBSTETRICS & GYNECOLOGY

## 2019-08-03 RX ORDER — CARBOPROST TROMETHAMINE 250 UG/ML
250 INJECTION, SOLUTION INTRAMUSCULAR
Status: DISCONTINUED | OUTPATIENT
Start: 2019-08-03 | End: 2019-08-03

## 2019-08-03 RX ORDER — BISACODYL 10 MG
10 SUPPOSITORY, RECTAL RECTAL DAILY PRN
Status: DISCONTINUED | OUTPATIENT
Start: 2019-08-05 | End: 2019-08-03

## 2019-08-03 RX ORDER — METOCLOPRAMIDE HYDROCHLORIDE 5 MG/ML
10 INJECTION INTRAMUSCULAR; INTRAVENOUS EVERY 6 HOURS PRN
Status: DISCONTINUED | OUTPATIENT
Start: 2019-08-03 | End: 2019-08-06 | Stop reason: HOSPADM

## 2019-08-03 RX ORDER — NALOXONE HYDROCHLORIDE 0.4 MG/ML
.1-.4 INJECTION, SOLUTION INTRAMUSCULAR; INTRAVENOUS; SUBCUTANEOUS
Status: DISCONTINUED | OUTPATIENT
Start: 2019-08-03 | End: 2019-08-03

## 2019-08-03 RX ORDER — LANOLIN 100 %
OINTMENT (GRAM) TOPICAL
Status: DISCONTINUED | OUTPATIENT
Start: 2019-08-03 | End: 2019-08-06 | Stop reason: HOSPADM

## 2019-08-03 RX ORDER — KETOROLAC TROMETHAMINE 30 MG/ML
30 INJECTION, SOLUTION INTRAMUSCULAR; INTRAVENOUS ONCE
Status: COMPLETED | OUTPATIENT
Start: 2019-08-03 | End: 2019-08-03

## 2019-08-03 RX ORDER — DOCUSATE SODIUM 100 MG/1
100 CAPSULE, LIQUID FILLED ORAL 2 TIMES DAILY
Status: DISCONTINUED | OUTPATIENT
Start: 2019-08-03 | End: 2019-08-03

## 2019-08-03 RX ORDER — ONDANSETRON 2 MG/ML
4 INJECTION INTRAMUSCULAR; INTRAVENOUS EVERY 6 HOURS PRN
Status: DISCONTINUED | OUTPATIENT
Start: 2019-08-03 | End: 2019-08-03

## 2019-08-03 RX ORDER — METHYLERGONOVINE MALEATE 0.2 MG/ML
200 INJECTION INTRAVENOUS
Status: DISCONTINUED | OUTPATIENT
Start: 2019-08-03 | End: 2019-08-03

## 2019-08-03 RX ORDER — FENTANYL CITRATE 50 UG/ML
25-50 INJECTION, SOLUTION INTRAMUSCULAR; INTRAVENOUS
Status: DISCONTINUED | OUTPATIENT
Start: 2019-08-03 | End: 2019-08-04

## 2019-08-03 RX ORDER — LANOLIN 100 %
OINTMENT (GRAM) TOPICAL
Status: DISCONTINUED | OUTPATIENT
Start: 2019-08-03 | End: 2019-08-03

## 2019-08-03 RX ORDER — ONDANSETRON 4 MG/1
4 TABLET, ORALLY DISINTEGRATING ORAL EVERY 30 MIN PRN
Status: DISCONTINUED | OUTPATIENT
Start: 2019-08-03 | End: 2019-08-03

## 2019-08-03 RX ORDER — LIDOCAINE 40 MG/G
CREAM TOPICAL
Status: DISCONTINUED | OUTPATIENT
Start: 2019-08-03 | End: 2019-08-03

## 2019-08-03 RX ORDER — METOCLOPRAMIDE HYDROCHLORIDE 5 MG/ML
10 INJECTION INTRAMUSCULAR; INTRAVENOUS EVERY 6 HOURS PRN
Status: DISCONTINUED | OUTPATIENT
Start: 2019-08-03 | End: 2019-08-03

## 2019-08-03 RX ORDER — HYDROCODONE BITARTRATE AND ACETAMINOPHEN 5; 325 MG/1; MG/1
1-2 TABLET ORAL EVERY 4 HOURS PRN
Status: DISCONTINUED | OUTPATIENT
Start: 2019-08-03 | End: 2019-08-06 | Stop reason: HOSPADM

## 2019-08-03 RX ORDER — DEXTROSE, SODIUM CHLORIDE, SODIUM LACTATE, POTASSIUM CHLORIDE, AND CALCIUM CHLORIDE 5; .6; .31; .03; .02 G/100ML; G/100ML; G/100ML; G/100ML; G/100ML
INJECTION, SOLUTION INTRAVENOUS CONTINUOUS
Status: DISCONTINUED | OUTPATIENT
Start: 2019-08-03 | End: 2019-08-03

## 2019-08-03 RX ORDER — FENTANYL CITRATE 50 UG/ML
INJECTION, SOLUTION INTRAMUSCULAR; INTRAVENOUS PRN
Status: DISCONTINUED | OUTPATIENT
Start: 2019-08-03 | End: 2019-08-03

## 2019-08-03 RX ORDER — CEFAZOLIN SODIUM 2 G/100ML
2 INJECTION, SOLUTION INTRAVENOUS
Status: DISCONTINUED | OUTPATIENT
Start: 2019-08-03 | End: 2019-08-04

## 2019-08-03 RX ORDER — MORPHINE SULFATE 0.5 MG/ML
INJECTION, SOLUTION EPIDURAL; INTRATHECAL; INTRAVENOUS PRN
Status: DISCONTINUED | OUTPATIENT
Start: 2019-08-03 | End: 2019-08-03

## 2019-08-03 RX ORDER — DIPHENHYDRAMINE HCL 25 MG
25 CAPSULE ORAL EVERY 6 HOURS PRN
Status: DISCONTINUED | OUTPATIENT
Start: 2019-08-03 | End: 2019-08-03

## 2019-08-03 RX ORDER — HYDROMORPHONE HYDROCHLORIDE 1 MG/ML
.3-.5 INJECTION, SOLUTION INTRAMUSCULAR; INTRAVENOUS; SUBCUTANEOUS EVERY 5 MIN PRN
Status: DISCONTINUED | OUTPATIENT
Start: 2019-08-03 | End: 2019-08-03

## 2019-08-03 RX ORDER — ONDANSETRON 2 MG/ML
INJECTION INTRAMUSCULAR; INTRAVENOUS PRN
Status: DISCONTINUED | OUTPATIENT
Start: 2019-08-03 | End: 2019-08-03

## 2019-08-03 RX ORDER — SODIUM CHLORIDE, SODIUM LACTATE, POTASSIUM CHLORIDE, CALCIUM CHLORIDE 600; 310; 30; 20 MG/100ML; MG/100ML; MG/100ML; MG/100ML
INJECTION, SOLUTION INTRAVENOUS CONTINUOUS
Status: DISCONTINUED | OUTPATIENT
Start: 2019-08-03 | End: 2019-08-04

## 2019-08-03 RX ORDER — SIMETHICONE 80 MG
80 TABLET,CHEWABLE ORAL 4 TIMES DAILY PRN
Status: DISCONTINUED | OUTPATIENT
Start: 2019-08-03 | End: 2019-08-03

## 2019-08-03 RX ORDER — MAGNESIUM HYDROXIDE 1200 MG/15ML
LIQUID ORAL PRN
Status: DISCONTINUED | OUTPATIENT
Start: 2019-08-03 | End: 2019-08-03 | Stop reason: HOSPADM

## 2019-08-03 RX ORDER — HYDROMORPHONE HYDROCHLORIDE 1 MG/ML
.3-.5 INJECTION, SOLUTION INTRAMUSCULAR; INTRAVENOUS; SUBCUTANEOUS EVERY 30 MIN PRN
Status: DISCONTINUED | OUTPATIENT
Start: 2019-08-03 | End: 2019-08-03

## 2019-08-03 RX ORDER — OXYTOCIN 10 [USP'U]/ML
10 INJECTION, SOLUTION INTRAMUSCULAR; INTRAVENOUS
Status: DISCONTINUED | OUTPATIENT
Start: 2019-08-03 | End: 2019-08-03

## 2019-08-03 RX ORDER — IBUPROFEN 600 MG/1
600 TABLET, FILM COATED ORAL EVERY 6 HOURS PRN
Status: DISCONTINUED | OUTPATIENT
Start: 2019-08-03 | End: 2019-08-03

## 2019-08-03 RX ORDER — NALOXONE HYDROCHLORIDE 0.4 MG/ML
.1-.4 INJECTION, SOLUTION INTRAMUSCULAR; INTRAVENOUS; SUBCUTANEOUS
Status: DISCONTINUED | OUTPATIENT
Start: 2019-08-03 | End: 2019-08-04

## 2019-08-03 RX ORDER — EPHEDRINE SULFATE 50 MG/ML
INJECTION, SOLUTION INTRAVENOUS PRN
Status: DISCONTINUED | OUTPATIENT
Start: 2019-08-03 | End: 2019-08-03

## 2019-08-03 RX ORDER — CEFAZOLIN SODIUM 2 G/100ML
INJECTION, SOLUTION INTRAVENOUS PRN
Status: DISCONTINUED | OUTPATIENT
Start: 2019-08-03 | End: 2019-08-03

## 2019-08-03 RX ORDER — ONDANSETRON 4 MG/1
4 TABLET, ORALLY DISINTEGRATING ORAL EVERY 30 MIN PRN
Status: DISCONTINUED | OUTPATIENT
Start: 2019-08-03 | End: 2019-08-04

## 2019-08-03 RX ORDER — LIDOCAINE 40 MG/G
CREAM TOPICAL
Status: DISCONTINUED | OUTPATIENT
Start: 2019-08-03 | End: 2019-08-06 | Stop reason: HOSPADM

## 2019-08-03 RX ORDER — BISACODYL 10 MG
10 SUPPOSITORY, RECTAL RECTAL DAILY PRN
Status: DISCONTINUED | OUTPATIENT
Start: 2019-08-05 | End: 2019-08-06 | Stop reason: HOSPADM

## 2019-08-03 RX ORDER — OXYTOCIN 10 [USP'U]/ML
10 INJECTION, SOLUTION INTRAMUSCULAR; INTRAVENOUS
Status: DISCONTINUED | OUTPATIENT
Start: 2019-08-03 | End: 2019-08-04

## 2019-08-03 RX ORDER — KETOROLAC TROMETHAMINE 30 MG/ML
30 INJECTION, SOLUTION INTRAMUSCULAR; INTRAVENOUS EVERY 6 HOURS
Status: DISPENSED | OUTPATIENT
Start: 2019-08-03 | End: 2019-08-04

## 2019-08-03 RX ORDER — METHYLERGONOVINE MALEATE 0.2 MG/ML
200 INJECTION INTRAVENOUS
Status: DISCONTINUED | OUTPATIENT
Start: 2019-08-03 | End: 2019-08-06 | Stop reason: HOSPADM

## 2019-08-03 RX ORDER — DIPHENHYDRAMINE HCL 25 MG
25 CAPSULE ORAL EVERY 6 HOURS PRN
Status: DISCONTINUED | OUTPATIENT
Start: 2019-08-03 | End: 2019-08-06 | Stop reason: HOSPADM

## 2019-08-03 RX ORDER — IBUPROFEN 400 MG/1
800 TABLET, FILM COATED ORAL EVERY 6 HOURS PRN
Status: DISCONTINUED | OUTPATIENT
Start: 2019-08-03 | End: 2019-08-06 | Stop reason: HOSPADM

## 2019-08-03 RX ORDER — CEFAZOLIN SODIUM 1 G/50ML
1 INJECTION, SOLUTION INTRAVENOUS SEE ADMIN INSTRUCTIONS
Status: DISCONTINUED | OUTPATIENT
Start: 2019-08-03 | End: 2019-08-04

## 2019-08-03 RX ORDER — DIPHENHYDRAMINE HYDROCHLORIDE 50 MG/ML
25 INJECTION INTRAMUSCULAR; INTRAVENOUS EVERY 6 HOURS PRN
Status: DISCONTINUED | OUTPATIENT
Start: 2019-08-03 | End: 2019-08-06 | Stop reason: HOSPADM

## 2019-08-03 RX ORDER — HYDROCORTISONE 2.5 %
CREAM (GRAM) TOPICAL 3 TIMES DAILY PRN
Status: DISCONTINUED | OUTPATIENT
Start: 2019-08-03 | End: 2019-08-06 | Stop reason: HOSPADM

## 2019-08-03 RX ORDER — KETOROLAC TROMETHAMINE 30 MG/ML
30 INJECTION, SOLUTION INTRAMUSCULAR; INTRAVENOUS EVERY 6 HOURS
Status: DISCONTINUED | OUTPATIENT
Start: 2019-08-03 | End: 2019-08-03

## 2019-08-03 RX ORDER — CITRIC ACID/SODIUM CITRATE 334-500MG
30 SOLUTION, ORAL ORAL
Status: DISCONTINUED | OUTPATIENT
Start: 2019-08-03 | End: 2019-08-04

## 2019-08-03 RX ORDER — HYDROMORPHONE HYDROCHLORIDE 1 MG/ML
.3-.5 INJECTION, SOLUTION INTRAMUSCULAR; INTRAVENOUS; SUBCUTANEOUS EVERY 30 MIN PRN
Status: DISCONTINUED | OUTPATIENT
Start: 2019-08-03 | End: 2019-08-06 | Stop reason: HOSPADM

## 2019-08-03 RX ORDER — CARBOPROST TROMETHAMINE 250 UG/ML
250 INJECTION, SOLUTION INTRAMUSCULAR
Status: DISCONTINUED | OUTPATIENT
Start: 2019-08-03 | End: 2019-08-06 | Stop reason: HOSPADM

## 2019-08-03 RX ORDER — CHLOROPROCAINE HYDROCHLORIDE 20 MG/ML
INJECTION, SOLUTION EPIDURAL; INFILTRATION; INTRACAUDAL; PERINEURAL PRN
Status: DISCONTINUED | OUTPATIENT
Start: 2019-08-03 | End: 2019-08-03

## 2019-08-03 RX ORDER — SODIUM CHLORIDE, SODIUM LACTATE, POTASSIUM CHLORIDE, CALCIUM CHLORIDE 600; 310; 30; 20 MG/100ML; MG/100ML; MG/100ML; MG/100ML
INJECTION, SOLUTION INTRAVENOUS CONTINUOUS
Status: DISCONTINUED | OUTPATIENT
Start: 2019-08-03 | End: 2019-08-03

## 2019-08-03 RX ORDER — ONDANSETRON 2 MG/ML
4 INJECTION INTRAMUSCULAR; INTRAVENOUS EVERY 30 MIN PRN
Status: DISCONTINUED | OUTPATIENT
Start: 2019-08-03 | End: 2019-08-03

## 2019-08-03 RX ORDER — ONDANSETRON 2 MG/ML
4 INJECTION INTRAMUSCULAR; INTRAVENOUS EVERY 30 MIN PRN
Status: DISCONTINUED | OUTPATIENT
Start: 2019-08-03 | End: 2019-08-04

## 2019-08-03 RX ORDER — DIPHENHYDRAMINE HYDROCHLORIDE 50 MG/ML
25 INJECTION INTRAMUSCULAR; INTRAVENOUS EVERY 6 HOURS PRN
Status: DISCONTINUED | OUTPATIENT
Start: 2019-08-03 | End: 2019-08-03

## 2019-08-03 RX ORDER — HYDROCORTISONE 2.5 %
CREAM (GRAM) TOPICAL 3 TIMES DAILY PRN
Status: DISCONTINUED | OUTPATIENT
Start: 2019-08-03 | End: 2019-08-03

## 2019-08-03 RX ORDER — DOCUSATE SODIUM 100 MG/1
100 CAPSULE, LIQUID FILLED ORAL 2 TIMES DAILY
Status: DISCONTINUED | OUTPATIENT
Start: 2019-08-03 | End: 2019-08-06 | Stop reason: HOSPADM

## 2019-08-03 RX ORDER — DEXTROSE, SODIUM CHLORIDE, SODIUM LACTATE, POTASSIUM CHLORIDE, AND CALCIUM CHLORIDE 5; .6; .31; .03; .02 G/100ML; G/100ML; G/100ML; G/100ML; G/100ML
INJECTION, SOLUTION INTRAVENOUS CONTINUOUS
Status: DISCONTINUED | OUTPATIENT
Start: 2019-08-03 | End: 2019-08-04

## 2019-08-03 RX ORDER — SIMETHICONE 80 MG
80 TABLET,CHEWABLE ORAL 4 TIMES DAILY PRN
Status: DISCONTINUED | OUTPATIENT
Start: 2019-08-03 | End: 2019-08-06 | Stop reason: HOSPADM

## 2019-08-03 RX ADMIN — KETOROLAC TROMETHAMINE 30 MG: 30 INJECTION, SOLUTION INTRAMUSCULAR at 11:35

## 2019-08-03 RX ADMIN — KETOROLAC TROMETHAMINE 30 MG: 30 INJECTION, SOLUTION INTRAMUSCULAR at 18:00

## 2019-08-03 RX ADMIN — AZITHROMYCIN MONOHYDRATE 500 MG: 500 INJECTION, POWDER, LYOPHILIZED, FOR SOLUTION INTRAVENOUS at 04:55

## 2019-08-03 RX ADMIN — CHLOROPROCAINE HYDROCHLORIDE 100 MG: 20 INJECTION, SOLUTION EPIDURAL; INFILTRATION; INTRACAUDAL; PERINEURAL at 02:10

## 2019-08-03 RX ADMIN — SODIUM CHLORIDE, POTASSIUM CHLORIDE, SODIUM LACTATE AND CALCIUM CHLORIDE: 600; 310; 30; 20 INJECTION, SOLUTION INTRAVENOUS at 00:00

## 2019-08-03 RX ADMIN — OXYTOCIN 3 UNITS: 10 INJECTION, SOLUTION INTRAMUSCULAR; INTRAVENOUS at 02:30

## 2019-08-03 RX ADMIN — DOCUSATE SODIUM 100 MG: 100 CAPSULE, LIQUID FILLED ORAL at 21:05

## 2019-08-03 RX ADMIN — HYDROCODONE BITARTRATE AND ACETAMINOPHEN 2 TABLET: 5; 325 TABLET ORAL at 15:14

## 2019-08-03 RX ADMIN — CHLOROPROCAINE HYDROCHLORIDE 100 MG: 20 INJECTION, SOLUTION EPIDURAL; INFILTRATION; INTRACAUDAL; PERINEURAL at 02:45

## 2019-08-03 RX ADMIN — HYDROCODONE BITARTRATE AND ACETAMINOPHEN 2 TABLET: 5; 325 TABLET ORAL at 21:04

## 2019-08-03 RX ADMIN — FENTANYL CITRATE 50 MCG: 50 INJECTION, SOLUTION INTRAMUSCULAR; INTRAVENOUS at 03:57

## 2019-08-03 RX ADMIN — Medication 200 ML: at 02:30

## 2019-08-03 RX ADMIN — CHLOROPROCAINE HYDROCHLORIDE 100 MG: 20 INJECTION, SOLUTION EPIDURAL; INFILTRATION; INTRACAUDAL; PERINEURAL at 02:14

## 2019-08-03 RX ADMIN — CHLOROPROCAINE HYDROCHLORIDE 100 MG: 20 INJECTION, SOLUTION EPIDURAL; INFILTRATION; INTRACAUDAL; PERINEURAL at 02:02

## 2019-08-03 RX ADMIN — Medication 100 MCG: at 00:45

## 2019-08-03 RX ADMIN — HYDROMORPHONE HYDROCHLORIDE 0.5 MG: 1 INJECTION, SOLUTION INTRAMUSCULAR; INTRAVENOUS; SUBCUTANEOUS at 07:59

## 2019-08-03 RX ADMIN — CHLOROPROCAINE HYDROCHLORIDE 100 MG: 20 INJECTION, SOLUTION EPIDURAL; INFILTRATION; INTRACAUDAL; PERINEURAL at 02:06

## 2019-08-03 RX ADMIN — OXYTOCIN 3 UNITS: 10 INJECTION, SOLUTION INTRAMUSCULAR; INTRAVENOUS at 02:24

## 2019-08-03 RX ADMIN — Medication 100 MCG: at 00:30

## 2019-08-03 RX ADMIN — CHLOROPROCAINE HYDROCHLORIDE 100 MG: 20 INJECTION, SOLUTION EPIDURAL; INFILTRATION; INTRACAUDAL; PERINEURAL at 02:27

## 2019-08-03 RX ADMIN — MORPHINE SULFATE 3 MG: 0.5 INJECTION, SOLUTION EPIDURAL; INTRATHECAL; INTRAVENOUS at 02:48

## 2019-08-03 RX ADMIN — HYDROCODONE BITARTRATE AND ACETAMINOPHEN 2 TABLET: 5; 325 TABLET ORAL at 09:10

## 2019-08-03 RX ADMIN — Medication 100 MCG: at 00:20

## 2019-08-03 RX ADMIN — SIMETHICONE CHEW TAB 80 MG 80 MG: 80 TABLET ORAL at 21:05

## 2019-08-03 RX ADMIN — KETOROLAC TROMETHAMINE 30 MG: 30 INJECTION, SOLUTION INTRAMUSCULAR at 03:57

## 2019-08-03 RX ADMIN — ONDANSETRON 4 MG: 2 INJECTION INTRAMUSCULAR; INTRAVENOUS at 02:27

## 2019-08-03 RX ADMIN — FENTANYL CITRATE 100 MCG: 50 INJECTION, SOLUTION INTRAMUSCULAR; INTRAVENOUS at 02:02

## 2019-08-03 RX ADMIN — Medication 100 MCG: at 00:40

## 2019-08-03 RX ADMIN — OXYTOCIN 3 UNITS: 10 INJECTION, SOLUTION INTRAMUSCULAR; INTRAVENOUS at 02:27

## 2019-08-03 RX ADMIN — CEFAZOLIN SODIUM 2 G: 2 INJECTION, SOLUTION INTRAVENOUS at 02:03

## 2019-08-03 RX ADMIN — EPHEDRINE SULFATE 5 MG: 50 INJECTION, SOLUTION INTRAVENOUS at 02:15

## 2019-08-03 RX ADMIN — EPHEDRINE SULFATE 5 MG: 50 INJECTION, SOLUTION INTRAVENOUS at 02:19

## 2019-08-03 RX ADMIN — PHENYLEPHRINE HYDROCHLORIDE 1.4 MCG/KG/MIN: 10 INJECTION INTRAVENOUS at 02:03

## 2019-08-03 RX ADMIN — SODIUM CHLORIDE, POTASSIUM CHLORIDE, SODIUM LACTATE AND CALCIUM CHLORIDE: 600; 310; 30; 20 INJECTION, SOLUTION INTRAVENOUS at 02:00

## 2019-08-03 RX ADMIN — Medication 200 ML: at 03:02

## 2019-08-03 RX ADMIN — SODIUM CHLORIDE, POTASSIUM CHLORIDE, SODIUM LACTATE AND CALCIUM CHLORIDE: 600; 310; 30; 20 INJECTION, SOLUTION INTRAVENOUS at 01:27

## 2019-08-03 ASSESSMENT — MIFFLIN-ST. JEOR: SCORE: 1690.02

## 2019-08-03 NOTE — ANESTHESIA PROCEDURE NOTES
Peripheral nerve/Neuraxial procedure note : epidural catheter  Pre-Procedure  Performed by  Gael Kowalski APRN CRNA   Location: OB    Procedure Times:8/2/2019 11:10 PM and 8/2/2019 11:35 PM  Pre-Anesthestic Checklist: patient identified, IV checked, risks and benefits discussed, informed consent, monitors and equipment checked, pre-op evaluation, at physician/surgeon's request and post-op pain management    Timeout  Correct Patient: Yes   Correct Procedure: Yes   Correct Site: Yes   Correct Laterality: Yes   Correct Position: Yes     .   Procedure Documentation    Diagnosis:Labor Pain.    Procedure:    Epidural catheter.  Insertion Site:L3-4  (midline approach) Injection technique: LORT air   Local skin infiltrated with 4 mL of 1% lidocaine.  JONEL at 8 cm     Patient Prep;mask, sterile gloves, chlorhexidine gluconate and isopropyl alcohol, patient draped.  .  Needle: Touhy needle Needle Gauge: 17.    Needle Length (Inches) 3.5  # of attempts: 1 and # of redirects:  .   Catheter: 20 G . .  Catheter threaded easily  .  15 cm at skin.   .    Assessment/Narrative  Paresthesias: No.  .  .  Aspiration negative for heme or CSF  . Test dose of 5 mL lidocaine 1.5% w/ 1:200,000 epinephrine at 23:23.  Test dose negative for signs of intravascular, subdural or intrathecal injection.

## 2019-08-03 NOTE — OR NURSING
PACU Transfer Note    Amanda Mckeon was recovered in room 404 in [facility]  Hand off report given to Piedad RN  PACU Respiratory Event Documentation     1) Episodes of Apnea greater than or equal to 10 seconds: no    2) Bradypnea - less than 8 breaths per minute: no    3) Pain score on 0 to 10 scale: 8 (decreasing after meds given)    4) Pain-sedation mismatch (yes or no): no    5) Repeated 02 desaturation less than 90% (yes or no): no    Anesthesia notified? (yes or no): no    Any of the above events occuring repeatedly in separate 30 minute intervals may be considered recurrent PACU respiratory events.    Patient stable and meets phase 1 discharge criteria for transport from PACU.

## 2019-08-03 NOTE — PROGRESS NOTES
Asked to see patient for non-reassuring fetal status. Induction of labor was performed. She is 3 cm dilated and SROM this AM. She was having repetitive late decels. Now EFM is cat 2 with normal baseline, moderate variability, accels present with occasional variable decel    I/p: Fetal intolerance of labor  Proceed to  section. Patient agrees, after discussion of risks, benefits, and alternatives including risk of infection, bleeding, injury to bowel, bladder, blood vessels or even baby rarely.    Omar Flower MD FACOG  1:44 AM 8/3/2019

## 2019-08-03 NOTE — ANESTHESIA PREPROCEDURE EVALUATION
Anesthesia Pre-Procedure Evaluation    Patient: Amanda Mckeon   MRN: [id number] : 1990          Preoperative Diagnosis: * No pre-op diagnosis entered *    * No procedures listed *    Past Medical History:   Diagnosis Date     Cyst of right ovary     2016     Herpesviral vesicular dermatitis     2016     Hypothyroidism     2015     Past Surgical History:   Procedure Laterality Date     ADENOIDECTOMY      as a child     AS SHOULDER ARTHROSCOPY, DX Right     Dr. Bolivar - age 12     MAMMOPLASTY REDUCTION      2007     PE TUBES      as a child       Anesthesia Evaluation       history and physical reviewed . Pt has had prior anesthetic.     No history of anesthetic complications          ROS/MED HX    ENT/Pulmonary:  - neg pulmonary ROS     Neurologic:  - neg neurologic ROS     Cardiovascular:  - neg cardiovascular ROS       METS/Exercise Tolerance:  >4 METS   Hematologic:  - neg hematologic  ROS       Musculoskeletal:  - neg musculoskeletal ROS       GI/Hepatic:     (+) GERD Asymptomatic on medication,       Renal/Genitourinary:  - ROS Renal section negative       Endo:     (+) type II DM thyroid problem .      Psychiatric:  - neg psychiatric ROS       Infectious Disease:  - neg infectious disease ROS       Malignancy:      - no malignancy   Other:    (+) Possibly pregnant                      (+) gestational diabetes          Physical Exam  Normal systems: cardiovascular, pulmonary and dental    Airway   Mallampati: II  TM distance: > 3 FB  Neck ROM: full  Mouth opening: > 3 cm    Dental     Cardiovascular       Pulmonary             Lab Results   Component Value Date    WBC 8.5 2019    HGB 12.4 2019    HCT 36.8 2019     2019     2018    POTASSIUM 4.0 2018    CHLORIDE 105 2018    CO2 18 (L) 2018    BUN 13 2018    CR 0.66 (L) 2018    GLC 88 2019    HENRY 8.2 (L) 2018    ALBUMIN 3.7 2018    PROTTOTAL 6.8  "2018    ALKPHOS 44 2018    BILITOTAL 0.4 2018    LIPASE 16.0 2018    AMYLASE 23 (L) 2018    T4 0.96 08/10/2018    HCG Positive (A) 2018       Preop Vitals  BP Readings from Last 3 Encounters:   19 119/61   19 120/60   19 114/64    Pulse Readings from Last 3 Encounters:   19 59   19 88   19 71      Resp Readings from Last 3 Encounters:   19 16   19 20   19 20    SpO2 Readings from Last 3 Encounters:   19 98%   19 97%   19 97%      Temp Readings from Last 1 Encounters:   19 98.2  F (36.8  C) (Oral)    Ht Readings from Last 1 Encounters:   19 1.626 m (5' 4\")      Wt Readings from Last 1 Encounters:   19 98 kg (216 lb)    Estimated body mass index is 37.08 kg/m  as calculated from the following:    Height as of 19: 1.626 m (5' 4\").    Weight as of 19: 98 kg (216 lb).       Anesthesia Plan      History & Physical Review      ASA Status:  2 .  OB Epidural Asa: 2   NPO Status:  > 4 hours    Plan for Epidural Maintenance will be Balanced.    PONV prophylaxis:  Ondansetron (or other 5HT-3)  Risks, benefits and alternatives discussed and would like to proceed to use existing epidural for . General anesthesia if indicated.         Postoperative Care  Postoperative pain management:  IV analgesics, Neuraxial analgesia and Multi-modal analgesia.      Consents  Anesthetic plan, risks, benefits and alternatives discussed with:  Patient, Patient and Spouse.  Use of blood products discussed: No .   .                 BRIAN Preston CRNA  "

## 2019-08-03 NOTE — ANESTHESIA POSTPROCEDURE EVALUATION
Patient: Amanda Mckeon    Procedure(s):   SECTION    Diagnosis:fetal intolerance of labor  Diagnosis Additional Information: No value filed.    Anesthesia Type:  Epidural    Note:  Anesthesia Post Evaluation    Patient location during evaluation: Bedside  Patient participation: Able to fully participate in evaluation  Level of consciousness: awake and alert  Pain management: adequate  Airway patency: patent  Cardiovascular status: acceptable  Respiratory status: acceptable  PONV: none     Anesthetic complications: None    Comments: Patient happy with epidural. Up walking, no residual numbness or tingling, sewell catheter remains in place, denies fevers or chills.          Last vitals:  Vitals:    19 1017 19 1031 19 1046   BP: 108/59 109/59 105/58   Pulse:      Resp:      Temp:      SpO2:            Electronically Signed By: BRIAN Preston CRNA  August 3, 2019  2:45 PM

## 2019-08-03 NOTE — PROGRESS NOTES
Patient returned to bed.  Monitors reapplied.   Having an occ. Ctx .  Category I tracing.  IV LR and IV pitocin started.  See labor flow sheet for VS and assessments.

## 2019-08-03 NOTE — OP NOTE
[facility] Gynecology Operative Note    Pre-operative diagnosis: Fetal intolerance of labor   Post-operative diagnosis: Same  Liveborn male infant, Apgars 8/9   Procedure: LTCS (low transverse  section)   Surgeon: Omar Flower MD   Assistant(s): None   Anesthesia: Spinal anesthesia   Estimated blood loss: 1000ml   Total IV fluids: (See anesthesia record)   Blood transfusion: No transfusion was given during surgery   Total urine output: (See anesthesia record)   Drains: Sewell catheter   Specimens: None   Findings: Liveborn male infant, nuchal cord x 1.   Complications: None   Condition: Stable   Procedure details: DESCRIPTION OF PROCEDURE:     The patient was transferred to the OR where spinal anesthesia was accomplished.  A sewell catheter was inserted and clear urine was noted.  The abdomen was scrubbed prepped and draped inthe usual manner.  A hard stop timeout was accomplished.  A transverse Pfannenstiel incision was made and sharp and electrocautery dissection carried down to the fascial layer.  The Fascia was opened in the midline andextended bluntly bilaterally.  The rectus muscles were  in the midline bluntly.  The peritoneum was bluntly divided and the Corina ring retractor was placed.  A bladder flap was made sharply.  The lower uterinesegment was incised sharply in a low transverse fashion and the amniotic sac ruptured bluntly with a finger.  Clear fluid was noted and the incision extended bluntly.  The fetal head was deliverd in the OT position.  The infant received nasal and oropharyngeal suction with the bulb suction. Nuchal cord x1 was noted and reduced. The shoulders delivered atraumatically followed by the remainder of the baby.   The cord wasclamped and cut and the infant handed to the  nurse.   evaluation was accomplished by Dr. Ordoñez due to emergent  to resuscitate baby.  The placenta was manually removed and the uterus wiped  clear of clots and debris. Pitocin was added to the IVinfusion and the uterus was noted to firm-up nicely. The tubes and ovaries appeared normal. The uterine incision was closed in a double layer of #1 Chromic in a running fashion. Hemostasis was adequate.  The abdomenirrigated clear of clots and debris.  The peritoneum and the rectus  muscles were approximated with  3 0 vicryl.   The fascia was closed with 0 PDS.  The subcutaneous layer was irrigated and made hemastatic withelectrocautery.  It was closed in a single layer of 3 0 vicryl. The skin was closed with Insorb staples.  The wound was dressed with steri-strips and a pressure dressing.  Counts were correct times 2.  The patient andnewborn were transferred to the recovery room in stable condition.    Omar Flower MD FACOG  2:56 AM 8/3/2019

## 2019-08-03 NOTE — PROGRESS NOTES
Patient up walking in redd, ext. Monitors off. No leaking fluid or vaginal bleeding noted.   Category I tracing prior to removal of monitors.  Dr. Farooq Benitez here.

## 2019-08-03 NOTE — PROGRESS NOTES
Patient with excellent relief from labor pain with epidural on board.  Noted there are occ variable and late decels.  Mother BP's low ( see VS flow sheet )  IV pitocin off.  Will give phenylephrine to bring up  BP and see if effect on FHTs.

## 2019-08-03 NOTE — PROGRESS NOTES
Assessment done, VSS. See flowsheet for further info. Resting in bed at this time. IV infusing, Abd dressing D/I, small amount of vaginal drainage on merna pad noted, sewell draining clear kel urine, SCD's in place. Plan of care discussed. Will continue to monitor.

## 2019-08-03 NOTE — PROGRESS NOTES
S)  Patient continues to have irregular contractions and has not made any significant progress yet.  We had previously talked about discharging patient home to try induction again another day if she had not made significant progress by 8 pm.  RN called me to come a little early as patient was anxious to go.  Patient had complained of a very small trickle of fluid earlier this afternoon, but has had no other fluid leaking since.  To evaluate further prior to discharging patient, we did an amnisure.  This returned POSITIVE.  Therefore, will not be discharging patient.  O) cervix is 1.5/50/-3.  FHTs:  Category I currently.  Baby did have one decel to 60s-70s briefly earlier this evening around 1715, but returned to baseline quickly afterward and has not returned.    [place]:  Irregular contractions.  A/P)  28 year old  @ 39 2/7 weeks with elective induction for GDM, now ruptured, but without active labor yet.  At 8 pm, when next cytotec dose would be due, will switch to pitocin instead.  Epidural when ready.    Liza Ordoñez MD on 2019 at 7:17 PM

## 2019-08-03 NOTE — ANESTHESIA CARE TRANSFER NOTE
Patient: Amanda Mckeon    Procedure(s):   SECTION    Diagnosis: fetal intolerance of labor  Diagnosis Additional Information: No value filed.    Anesthesia Type:   Epidural     Note:  Airway :Room Air  Patient transferred to:Labor and Delivery  Handoff Report: Identifed the Patient, Identified the Reponsible Provider, Reviewed the pertinent medical history, Discussed the surgical course, Reviewed Intra-OP anesthesia mangement and issues during anesthesia, Set expectations for post-procedure period and Allowed opportunity for questions and acknowledgement of understanding      Vitals: (Last set prior to Anesthesia Care Transfer)    CRNA VITALS  8/3/2019 0232 - 8/3/2019 0314      8/3/2019             Resp Rate (set):  10                Electronically Signed By: BRIAN Preston CRNA  August 3, 2019  3:14 AM

## 2019-08-03 NOTE — PROGRESS NOTES
Pt. Increasingly uncomfortable , rates pain 7/10.  Requesting epidural.  CRNA present and notified of pt desire. Category I fetal monitor tracing.  Preparing pt for epidural.  Refer to flow sheet for assessments and VS.

## 2019-08-03 NOTE — PROGRESS NOTES
To OR via bed.  Hand off report given to Anju DURBIN IV patent, melodie patent,  FHT's prior to transfer 120's

## 2019-08-03 NOTE — PROGRESS NOTES
S)  I was asked to come to evaluate patient by RN.  Patient had her epidural placed around 2330.  About 30 minutes later, she was noted to have a late decel.  Blood pressure was low and 3 doses of phenylephrine were given.  She has been fluid resuscitated, oxygen placed, repositioned and pitocin turned off.  She continued to have repetitive late decels despite interventions, so I was called.  By the time of my arrival, decels have resolved and baby's heart rate has rebounded to the 120s-130s and is having accelerations.  Blood sugar several minutes ago was 66.  O) cervix checked several minutes ago by RN was 3 cm.  FHTs as noted above.  [place] - currently about every 2 minutes.  A/P)  28 year old  @ 39 3/7 weeks with GDM (diet controlled) with repetitive late decels, now resolved with above noted interventions.  I fear that if her pitocin is turned back on that she will have more late decelerations.  Discussed with patient and her  and they feel that they would like to move toward a .  Dr. Flower and OR crew have been notified.  Liza Ordoñez MD on 8/3/2019 at 1:21 AM

## 2019-08-03 NOTE — PROGRESS NOTES
Patient having repetative late decels. SVE dilated 3 cm/ -3 station  Patient turned side to side, oxygen on at 10 L via mask,  IV fluid bolus given .  Patient has received 4 doses of phenylephrine for low BP's which have now become more stable. ( See labor flow sheet for assessments and VS ).  Pitocin has been off and contractions are 1.5 - 3 minutes apart.   Dr. Farooq Benitez notified to come to evaluate patient.

## 2019-08-04 LAB — HGB BLD-MCNC: 11.3 G/DL (ref 11.7–15.7)

## 2019-08-04 PROCEDURE — 85018 HEMOGLOBIN: CPT | Performed by: OBSTETRICS & GYNECOLOGY

## 2019-08-04 PROCEDURE — 99024 POSTOP FOLLOW-UP VISIT: CPT | Performed by: OBSTETRICS & GYNECOLOGY

## 2019-08-04 PROCEDURE — 12000000 ZZH R&B MED SURG/OB

## 2019-08-04 PROCEDURE — 40000275 ZZH STATISTIC RCP TIME EA 10 MIN

## 2019-08-04 PROCEDURE — 25000132 ZZH RX MED GY IP 250 OP 250 PS 637: Performed by: OBSTETRICS & GYNECOLOGY

## 2019-08-04 PROCEDURE — 25000128 H RX IP 250 OP 636: Performed by: OBSTETRICS & GYNECOLOGY

## 2019-08-04 PROCEDURE — 36415 COLL VENOUS BLD VENIPUNCTURE: CPT | Performed by: OBSTETRICS & GYNECOLOGY

## 2019-08-04 RX ADMIN — IBUPROFEN 800 MG: 400 TABLET ORAL at 18:01

## 2019-08-04 RX ADMIN — DOCUSATE SODIUM 100 MG: 100 CAPSULE, LIQUID FILLED ORAL at 20:53

## 2019-08-04 RX ADMIN — HYDROCODONE BITARTRATE AND ACETAMINOPHEN 2 TABLET: 5; 325 TABLET ORAL at 20:52

## 2019-08-04 RX ADMIN — HYDROCODONE BITARTRATE AND ACETAMINOPHEN 2 TABLET: 5; 325 TABLET ORAL at 03:20

## 2019-08-04 RX ADMIN — SIMETHICONE CHEW TAB 80 MG 80 MG: 80 TABLET ORAL at 20:54

## 2019-08-04 RX ADMIN — DOCUSATE SODIUM 100 MG: 100 CAPSULE, LIQUID FILLED ORAL at 08:39

## 2019-08-04 RX ADMIN — IBUPROFEN 800 MG: 400 TABLET ORAL at 23:29

## 2019-08-04 RX ADMIN — HYDROCODONE BITARTRATE AND ACETAMINOPHEN 2 TABLET: 5; 325 TABLET ORAL at 08:38

## 2019-08-04 RX ADMIN — KETOROLAC TROMETHAMINE 30 MG: 30 INJECTION, SOLUTION INTRAMUSCULAR at 00:21

## 2019-08-04 RX ADMIN — IBUPROFEN 800 MG: 400 TABLET ORAL at 12:19

## 2019-08-04 RX ADMIN — HYDROCODONE BITARTRATE AND ACETAMINOPHEN 2 TABLET: 5; 325 TABLET ORAL at 14:35

## 2019-08-04 RX ADMIN — IBUPROFEN 800 MG: 400 TABLET ORAL at 06:15

## 2019-08-04 NOTE — PROGRESS NOTES
Patient showered this shift, voided W/O difficulty. Pain is being controlled with oral meds. She offers no complaints at this time.

## 2019-08-04 NOTE — PROGRESS NOTES
Patient discharged from PACU.  Patient awake although sleepy  and responding to questions.  Fundus firm  U/ U, flow is light rubra without clots , IV patent and infusing, Armendariz catheter is patent and draining clear yellow urine.  Patient is beginning to get pain relief.  Refer to flow sheets for assessments and VS and breast feeding experiences.

## 2019-08-04 NOTE — PROGRESS NOTES
[facility]    Obstetrics Post-Op / Progress Note    Assessment & Plan   Assessment:  -1 Day Post-Op  Procedure(s):   SECTION    Doing well.  No excessive bleeding  Pain well-controlled.    Plan:  Ambulation encouraged  Monitor wound for signs of infection  Pain control measures as needed    Omar LEVYMaranda Ching     Interval History   Doing well.  Pain is well-controlled.  No fevers.  No history of wound drainage, warmth or significant erythema.  Good appetite.  Denies chest pain, shortness of breath, nausea or vomiting.  Ambulatory.  Breastfeeding well.    Medications     - MEDICATION INSTRUCTIONS -       NO Rho (D) immune globulin (RhoGam) needed - mother Rh POSITIVE       - MEDICATION INSTRUCTIONS -       oxytocin in 0.9% NaCl         docusate sodium  100 mg Oral BID     ketorolac  30 mg Intravenous Q6H       Physical Exam   Temp: 98.3  F (36.8  C) Temp src: Oral BP: 106/60 Pulse: 82   Resp: 16 SpO2: 95 % O2 Device: None (Room air)    Vitals:    19 0132   Weight: 98 kg (216 lb)     Vital Signs with Ranges  Temp:  [97.5  F (36.4  C)-98.4  F (36.9  C)] 98.3  F (36.8  C)  Pulse:  [72-90] 82  Resp:  [16-18] 16  BP: ()/(51-71) 106/60  SpO2:  [95 %] 95 %  I/O last 3 completed shifts:  In: 1050 [P.O.:1050]  Out: 2600 [Urine:2600]    Uterine fundus is firm, non-tender and at the level of the umbilicus  Incision C/D/I  Extremities Non-tender    Data   Recent Labs   Lab Test 19  0630   ABO O   RH Pos     Recent Labs   Lab Test 19  0730 19  0630   HGB 11.3* 12.4     Recent Labs   Lab Test 18  1206   RUQIGG 16

## 2019-08-04 NOTE — PROGRESS NOTES
Attempted to increase IV pitocin from 6 mu to 8 mu at 2200  but patient began having tachysystole so IV pitocin downed to 4 mu.   Patient repositioned.  Contraction pattern appeared to resolve with ctx now every 1.5 - 5 minutes,  and Category I tracing

## 2019-08-04 NOTE — PLAN OF CARE
Assessments as charted. B/P: 106/60, T: 98.3, P: 82, R: 16. See flow sheet for pain assessments and interventions .  Incision: no drainage and covered by dressing which is clean/dry and intact .  Abdomen distended , soft, with  faint bowel sounds.  Mother reports she is passing flatus. Armendariz catheter in place and draining kel colored urine Fundus firm U/U . Lochia: Moderate. Activity: moving with difficulty due to post op surgical pain. Infant feeding: Breast feeding going fairly well, mother using nipple shield with success .   Refer to flow sheets for documentation on breast feeding status.   Postpartum care and breast feeding education provided, see Patient Education activity. Patient has many questions regarding baby care and breast feeding. Will continue to educate and assist as needed.   Piedad Mccrary RN

## 2019-08-04 NOTE — PROGRESS NOTES
Epidural placed per CRNA, see his note.  Time out was conducted per protocol with CRNA and this RN.  See flow sheets for VS and assessments. Patient with pain relief.

## 2019-08-05 PROCEDURE — 99024 POSTOP FOLLOW-UP VISIT: CPT | Performed by: OBSTETRICS & GYNECOLOGY

## 2019-08-05 PROCEDURE — 25000132 ZZH RX MED GY IP 250 OP 250 PS 637: Performed by: OBSTETRICS & GYNECOLOGY

## 2019-08-05 PROCEDURE — 12000000 ZZH R&B MED SURG/OB

## 2019-08-05 RX ADMIN — HYDROCODONE BITARTRATE AND ACETAMINOPHEN 1 TABLET: 5; 325 TABLET ORAL at 19:50

## 2019-08-05 RX ADMIN — DOCUSATE SODIUM 100 MG: 100 CAPSULE, LIQUID FILLED ORAL at 19:50

## 2019-08-05 RX ADMIN — DOCUSATE SODIUM 100 MG: 100 CAPSULE, LIQUID FILLED ORAL at 10:19

## 2019-08-05 RX ADMIN — HYDROCODONE BITARTRATE AND ACETAMINOPHEN 2 TABLET: 5; 325 TABLET ORAL at 03:11

## 2019-08-05 RX ADMIN — IBUPROFEN 800 MG: 400 TABLET ORAL at 12:35

## 2019-08-05 RX ADMIN — HYDROCODONE BITARTRATE AND ACETAMINOPHEN 2 TABLET: 5; 325 TABLET ORAL at 10:19

## 2019-08-05 RX ADMIN — HYDROCODONE BITARTRATE AND ACETAMINOPHEN 1 TABLET: 5; 325 TABLET ORAL at 14:39

## 2019-08-05 RX ADMIN — IBUPROFEN 800 MG: 400 TABLET ORAL at 18:28

## 2019-08-05 RX ADMIN — IBUPROFEN 800 MG: 400 TABLET ORAL at 06:05

## 2019-08-05 RX ADMIN — HYDROCODONE BITARTRATE AND ACETAMINOPHEN 1 TABLET: 5; 325 TABLET ORAL at 23:34

## 2019-08-05 NOTE — PLAN OF CARE
Assessments as charted. B/P: 118/70, T: 98.5, P: 74, R: 16. Managing pain by alternating ibuprofen and Norco , refer to flow sheets for pain assessments and pt. Responses . Incision: Healing well, without signs of infection and no drainage. Voiding without difficulty. Fundus firm U/U. Lochia: Light. Activity: moving with difficulty due to post op surgical pain. Infant feeding: Breast feeding going fairly well with mother using a nipple shield.   Mother is teary this shift.  Verbalizes frustration with slow movements since surgery and not being able to resume activity as before C/Section .                                                                   Postpartum care education provided, see Patient Education activity.  Will monitor and continue preparing for discharge.   Piedad Mccrary RN

## 2019-08-05 NOTE — PROGRESS NOTES
[facility]    Obstetrics Post-Op / Progress Note    Assessment & Plan   Assessment:  -2 Days Post-Op  Procedure(s):   SECTION    Doing well.  Clean wound without signs of infection.  No immediate surgical complications identified.  Pain well-controlled.    Plan:  Ambulation encouraged  Monitor wound for signs of infection  Pain control measures as needed  Anticipate discharge tomorrow    Omar Flower     Interval History   Doing well.  Pain is well-controlled.  No fevers.  No history of wound drainage, warmth or significant erythema.  Good appetite.  Denies chest pain, shortness of breath, nausea or vomiting.  Ambulatory.  Breastfeeding well.    Medications     - MEDICATION INSTRUCTIONS -       NO Rho (D) immune globulin (RhoGam) needed - mother Rh POSITIVE       - MEDICATION INSTRUCTIONS -       oxytocin in 0.9% NaCl         docusate sodium  100 mg Oral BID       Physical Exam   Temp: 98.5  F (36.9  C) Temp src: Oral BP: 118/70 Pulse: 74   Resp: 16 SpO2: 96 % O2 Device: None (Room air)    Vitals:    19 0132   Weight: 98 kg (216 lb)     Vital Signs with Ranges  Temp:  [97.8  F (36.6  C)-98.5  F (36.9  C)] 98.5  F (36.9  C)  Pulse:  [74-95] 74  Resp:  [16] 16  BP: (118-124)/(70-81) 118/70  SpO2:  [96 %] 96 %  No intake/output data recorded.    Uterine fundus is firm, non-tender and at the level of the umbilicus  Incision C/D/I  Extremities Non-tender    Data   Recent Labs   Lab Test 19  0630   ABO O   RH Pos     Recent Labs   Lab Test 19  0730 19  0630   HGB 11.3* 12.4     Recent Labs   Lab Test 18  1206   RUQIGG 16

## 2019-08-06 VITALS
RESPIRATION RATE: 20 BRPM | WEIGHT: 216 LBS | BODY MASS INDEX: 36.88 KG/M2 | HEIGHT: 64 IN | TEMPERATURE: 97.9 F | OXYGEN SATURATION: 96 % | SYSTOLIC BLOOD PRESSURE: 105 MMHG | HEART RATE: 80 BPM | DIASTOLIC BLOOD PRESSURE: 59 MMHG

## 2019-08-06 PROCEDURE — 99024 POSTOP FOLLOW-UP VISIT: CPT | Performed by: OBSTETRICS & GYNECOLOGY

## 2019-08-06 PROCEDURE — 25000132 ZZH RX MED GY IP 250 OP 250 PS 637: Performed by: OBSTETRICS & GYNECOLOGY

## 2019-08-06 RX ORDER — IBUPROFEN 600 MG/1
600 TABLET, FILM COATED ORAL EVERY 6 HOURS PRN
Qty: 30 TABLET | Refills: 0 | Status: SHIPPED | OUTPATIENT
Start: 2019-08-06 | End: 2019-08-15

## 2019-08-06 RX ORDER — DOCUSATE SODIUM 100 MG/1
100 CAPSULE, LIQUID FILLED ORAL 2 TIMES DAILY PRN
Qty: 20 CAPSULE | Refills: 0 | Status: SHIPPED | OUTPATIENT
Start: 2019-08-06 | End: 2019-08-15

## 2019-08-06 RX ORDER — HYDROCODONE BITARTRATE AND ACETAMINOPHEN 5; 325 MG/1; MG/1
1-2 TABLET ORAL EVERY 6 HOURS PRN
Qty: 20 TABLET | Refills: 0 | Status: SHIPPED | OUTPATIENT
Start: 2019-08-06 | End: 2019-08-09

## 2019-08-06 RX ADMIN — HYDROCODONE BITARTRATE AND ACETAMINOPHEN 2 TABLET: 5; 325 TABLET ORAL at 13:55

## 2019-08-06 RX ADMIN — HYDROCODONE BITARTRATE AND ACETAMINOPHEN 2 TABLET: 5; 325 TABLET ORAL at 03:41

## 2019-08-06 RX ADMIN — HYDROCODONE BITARTRATE AND ACETAMINOPHEN 2 TABLET: 5; 325 TABLET ORAL at 07:30

## 2019-08-06 RX ADMIN — DOCUSATE SODIUM 100 MG: 100 CAPSULE, LIQUID FILLED ORAL at 10:56

## 2019-08-06 RX ADMIN — IBUPROFEN 800 MG: 400 TABLET ORAL at 10:56

## 2019-08-06 RX ADMIN — BISACODYL 10 MG: 10 SUPPOSITORY RECTAL at 09:13

## 2019-08-06 RX ADMIN — IBUPROFEN 800 MG: 400 TABLET ORAL at 01:34

## 2019-08-06 NOTE — PLAN OF CARE
Problem: Pain (Postpartum  Delivery)  Goal: Acceptable Pain Control  Intervention: Prevent or Manage Pain  Flowsheets (Taken 2019 0623)  Pain Management Interventions: medication (see MAR); around-the-clock dosing utilized; cold applied; quiet environment facilitated; rest; splinting; pain management plan reviewed with patient/caregiver  Note:   Patient has been receiving PRN Ibuprofen and Norco for pain control every 3-4 hrs. Pain did increase overnight when patient attempted to take less Norco, rates 5/10 incisional pain. Encouraged to continue round the clock dosing and use ice packs for comfort.     Carey Luciano RN

## 2019-08-06 NOTE — DISCHARGE INSTRUCTIONS
If BREASTFEEDING;  Have a nodule in your breast (Blocked ducts)  *use moist heat and gentle breast massage before feedings (try this in the shower)  *Nurse the baby frequently on the plugged duct side  *Nurse with the baby's chin pointing in the direction of the blocked duct to help drain the affected area  *Massage breast down toward the nipple to help your milk flow when infant nursing  *Rest as much as possible     MASTITIS  Breast infection. Symptoms include a painful, firm red area on your breast, a fever and flu-like symptoms. Nipple damage, infrequent or poor breastfeeding, blocked ducts can cause mastitis.   *Call your healthcare provider  *Take your full coarse of antibiotics  *Rest and drink fluids  *Continue feeding baby from both breasts  *If too painful pump and cup feed infant  *Use heat for comfort  *Get advice from your lactation consultant    ENGORGEMENT  Milk production increases. Your breast become cooper, heavier, and firmer. Nursing often and draining your breasts regularly can help prevent excessive swelling. Ice may feel comfortable. Wear a bra with good support and easy to open cups.Take a refridgerated cabbage, break the veins of a leaf and place to breast for swelling.  May need to pump slightly to relieve your pain for comfort and cup feeding it to your infant.    SORE NIPPLES  *Wash your hands before touching breast    *Start feeding on the breast that is the least sore, move to the other side once your milk has let down  *Make sure infant is tummy to tummy with you and she is hugging your breat with a deep latch (fish lipped)  *May need to pump and cup feed infant  *Nipple shield   *Warm pack or gel packs for comfort  *Rub breast milk into sore nipple-air dry  *Place a wet peppermint/spearmint tea bag to nipple    Storage Rule of 3's  Fresh breast milk can stay out 3-6 hours, then refrigerate unused breastmilk.   Milk may stay in a refrigerator for 3 days  -then 3-6 months in  refrigerator freezer  -then 6-12 months in a deep freeze  Once baby drinks from milk, left overs should be used with in 1-2 hours or throw away.      If BREASTFEEDING;  Have a nodule in your breast (Blocked ducts)  *use moist heat and gentle breast massage before feedings (try this in the shower)  *Nurse the baby frequently on the plugged duct side  *Nurse with the baby's chin pointing in the direction of the blocked duct to help drain the affected area  *Massage breast down toward the nipple to help your milk flow when infant nursing  *Rest as much as possible

## 2019-08-06 NOTE — DISCHARGE SUMMARY
[facility]    Discharge Summary  Obstetrics    Date of Admission:  2019  Date of Discharge:  2019  Discharging Provider: Omar Flower    Discharge Diagnoses   fetal intolerance of labor    Procedure/Surgery Information   Procedure: Procedure(s):   SECTION   Surgeon(s): Surgeon(s) and Role:     * Omar Flower MD - Primary     History of Present Illness   Amanda Mckeon is a 28 year old female who presented with fetal intolerance of labor and had a  section.    Hospital Course   The patient's hospital course was unremarkable.  She recovered as anticipated and experienced no post-operative complications.  On discharge, her pain was well controlled. Vaginal bleeding is similar to peak menstrual flow.  Voiding without difficulty.  Ambulating well and tolerating a normal diet.  No fever or significant wound drainage.  Breastfeeding well.  Infant is stable.  She was discharged on post-partum day #3.    Post-partum hemoglobin:   Hemoglobin   Date Value Ref Range Status   2019 11.3 (L) 11.7 - 15.7 g/dL Final       Omar Flower    Discharge Disposition   Discharged to home   Condition at discharge: Stable    Pending Results   Final pathology results: No pathology submitted    Unresulted Labs Ordered in the Past 30 Days of this Admission     No orders found from 7/3/2019 to 8/3/2019.          Primary Care Physician   Physician No Ref-Primary    Consultations This Hospital Stay   ANESTHESIOLOGY IP CONSULT  HOME CARE POST PARTUM/ IP CONSULT  LACTATION IP CONSULT  HOME CARE POST PARTUM/ IP CONSULT  LACTATION IP CONSULT    Discharge Orders      Activity    Review discharge instructions     Reason for your hospital stay    Maternity care     Follow Up and recommended labs and tests    Follow up with me,  Omar Flower, within 6 weeks. for hospital follow- up.  No follow up labs or test are needed.     Discharge Instructions - Postpartum visit    Schedule  postpartum visit with your provider and return to clinic in 6 weeks. Also 2 weeks if a  section was done.     Diet    Resume previous diet     Discharge Medications   Current Discharge Medication List      START taking these medications    Details   docusate sodium (COLACE) 100 MG capsule Take 1 capsule (100 mg) by mouth 2 times daily as needed for constipation  Qty: 20 capsule, Refills: 0    Associated Diagnoses: S/P  section      HYDROcodone-acetaminophen (NORCO) 5-325 MG tablet Take 1-2 tablets by mouth every 6 hours as needed for moderate to severe pain  Qty: 20 tablet, Refills: 0    Associated Diagnoses: S/P  section      ibuprofen (ADVIL/MOTRIN) 600 MG tablet Take 1 tablet (600 mg) by mouth every 6 hours as needed for other (cramping)  Qty: 30 tablet, Refills: 0    Associated Diagnoses: S/P  section         CONTINUE these medications which have NOT CHANGED    Details   alcohol swab prep pads Use to swab area of injection/martir as directed  Qty: 100 each, Refills: 3    Associated Diagnoses: Gestational diabetes mellitus (GDM) in third trimester, gestational diabetes method of control unspecified      blood glucose (ACCU-CHEK FASTCLIX) lancing device Device to be used with lancets.  Qty: 1 each, Refills: 0    Associated Diagnoses: Gestational diabetes mellitus (GDM) in third trimester, gestational diabetes method of control unspecified      blood glucose (NO BRAND SPECIFIED) test strip Use to test blood sugar 4 times daily or as directed. To accompany: Blood Glucose Monitor Brands: per insurance.  Qty: 100 strip, Refills: 6    Comments: accu-chek erich  Associated Diagnoses: Gestational diabetes mellitus (GDM) in third trimester, gestational diabetes method of control unspecified      blood glucose calibration (NO BRAND SPECIFIED) solution Use to calibrate blood glucose monitor as needed as directed. To accompany: Blood Glucose Monitor Brands: per insurance.  Qty: 1 Bottle,  Refills: 3    Associated Diagnoses: Gestational diabetes mellitus (GDM) in third trimester, gestational diabetes method of control unspecified      blood glucose monitoring (ACCU-CHEK YESSICA PLUS) meter device kit USE TO TEST BLOOD SUGAR  Refills: 0      folic acid (FOLVITE) 1 MG tablet Take 1 tablet (1 mg) by mouth daily  Qty: 100 tablet, Refills: 3    Associated Diagnoses: Encounter for contraceptive management, unspecified type      levothyroxine (SYNTHROID/LEVOTHROID) 100 MCG tablet Take 1 tablet (100 mcg) by mouth daily  Qty: 30 tablet, Refills: 11    Associated Diagnoses: Hypothyroidism, unspecified type      thin (NO BRAND SPECIFIED) lancets Use to test blood sugar 4 times daily or as directed. To accompany: Blood Glucose Monitor Brands: per insurance.  Qty: 100 each, Refills: 6    Associated Diagnoses: Gestational diabetes mellitus (GDM) in third trimester, gestational diabetes method of control unspecified      acyclovir (ZOVIRAX) 800 MG tablet Take 1 tablet (800 mg) by mouth 5 times daily  Qty: 50 tablet, Refills: 3    Associated Diagnoses: Recurrent cold sores      fluticasone (FLONASE) 50 MCG/ACT spray Spray 2 sprays into both nostrils daily  Qty: 1 Bottle, Refills: 11    Associated Diagnoses: URI with cough and congestion           Allergies   Allergies   Allergen Reactions     Amoxicillin Hives     Childhood

## 2019-08-06 NOTE — PLAN OF CARE
NS[facility] DISCHARGE NOTE    Patient discharged to home at 1657 PM via ambulation. Accompanied by spouse and staff. Discharge instructions reviewed with patient and spouse, opportunity offered to ask questions. Prescriptions sent with patient to fill . All belongings sent with patient.    Lizzette Martin

## 2019-08-06 NOTE — PROGRESS NOTES
[facility]    Obstetrics Post-Op / Progress Note    Assessment & Plan   Assessment:  -3 Days Post-Op  Procedure(s):   SECTION    Doing well.  Clean wound without signs of infection.  No immediate surgical complications identified.  No excessive bleeding  Pain well-controlled.    Plan:  Ambulation encouraged  Monitor wound for signs of infection  Pain control measures as needed  Discharge later today    Omar Flower     Interval History   Doing well.  Pain is well-controlled.  No fevers.  No history of wound drainage, warmth or significant erythema.  Good appetite.  Denies chest pain, shortness of breath, nausea or vomiting.  Ambulatory.  Breastfeeding well.    Medications     - MEDICATION INSTRUCTIONS -       NO Rho (D) immune globulin (RhoGam) needed - mother Rh POSITIVE       - MEDICATION INSTRUCTIONS -       oxytocin in 0.9% NaCl         docusate sodium  100 mg Oral BID       Physical Exam   Temp: 97.2  F (36.2  C) Temp src: Temporal BP: 129/65 Pulse: 80   Resp: 18 SpO2: 97 %      Vitals:    19 0132   Weight: 98 kg (216 lb)     Vital Signs with Ranges  Temp:  [97.2  F (36.2  C)-98  F (36.7  C)] 97.2  F (36.2  C)  Pulse:  [80] 80  Resp:  [16-20] 18  BP: (123-132)/(65-77) 129/65  SpO2:  [97 %] 97 %  I/O last 3 completed shifts:  In: 850 [P.O.:850]  Out: -     Uterine fundus is firm, non-tender and at the level of the umbilicus  Incision C/D/I  Extremities Non-tender    Data   Recent Labs   Lab Test 19  0630   ABO O   RH Pos     Recent Labs   Lab Test 19  0730 19  0630   HGB 11.3* 12.4     Recent Labs   Lab Test 18  1206   RUQIGG 16

## 2019-08-07 ENCOUNTER — OFFICE VISIT (OUTPATIENT)
Dept: FAMILY MEDICINE | Facility: OTHER | Age: 29
End: 2019-08-07
Attending: NURSE PRACTITIONER
Payer: COMMERCIAL

## 2019-08-07 VITALS
HEART RATE: 80 BPM | RESPIRATION RATE: 15 BRPM | DIASTOLIC BLOOD PRESSURE: 90 MMHG | HEIGHT: 64 IN | BODY MASS INDEX: 35.99 KG/M2 | SYSTOLIC BLOOD PRESSURE: 130 MMHG | WEIGHT: 210.8 LBS | TEMPERATURE: 98.6 F

## 2019-08-07 PROCEDURE — 99213 OFFICE O/P EST LOW 20 MIN: CPT | Performed by: NURSE PRACTITIONER

## 2019-08-07 ASSESSMENT — ANXIETY QUESTIONNAIRES
6. BECOMING EASILY ANNOYED OR IRRITABLE: NOT AT ALL
7. FEELING AFRAID AS IF SOMETHING AWFUL MIGHT HAPPEN: NOT AT ALL
1. FEELING NERVOUS, ANXIOUS, OR ON EDGE: NOT AT ALL
3. WORRYING TOO MUCH ABOUT DIFFERENT THINGS: NOT AT ALL
2. NOT BEING ABLE TO STOP OR CONTROL WORRYING: NOT AT ALL
GAD7 TOTAL SCORE: 0
4. TROUBLE RELAXING: NOT AT ALL
5. BEING SO RESTLESS THAT IT IS HARD TO SIT STILL: NOT AT ALL

## 2019-08-07 ASSESSMENT — MIFFLIN-ST. JEOR: SCORE: 1666.18

## 2019-08-07 ASSESSMENT — PAIN SCALES - GENERAL: PAINLEVEL: NO PAIN (1)

## 2019-08-07 ASSESSMENT — PATIENT HEALTH QUESTIONNAIRE - PHQ9: SUM OF ALL RESPONSES TO PHQ QUESTIONS 1-9: 0

## 2019-08-07 NOTE — NURSING NOTE
Patient presents to the clinic for a lactaition appointment. Patient denies any concerns.  Medication Reconciliation Completed.    Bill Augustine LPN  8/7/2019 11:22 AM

## 2019-08-08 ENCOUNTER — MYC MEDICAL ADVICE (OUTPATIENT)
Dept: OBGYN | Facility: OTHER | Age: 29
End: 2019-08-08

## 2019-08-08 ASSESSMENT — ANXIETY QUESTIONNAIRES: GAD7 TOTAL SCORE: 0

## 2019-08-09 ENCOUNTER — OFFICE VISIT (OUTPATIENT)
Dept: OBGYN | Facility: OTHER | Age: 29
End: 2019-08-09
Attending: OBSTETRICS & GYNECOLOGY
Payer: COMMERCIAL

## 2019-08-09 VITALS
SYSTOLIC BLOOD PRESSURE: 130 MMHG | RESPIRATION RATE: 20 BRPM | DIASTOLIC BLOOD PRESSURE: 88 MMHG | TEMPERATURE: 98.7 F | BODY MASS INDEX: 35.84 KG/M2 | HEART RATE: 78 BPM | WEIGHT: 208.8 LBS

## 2019-08-09 DIAGNOSIS — Z98.891 S/P CESAREAN SECTION: ICD-10-CM

## 2019-08-09 DIAGNOSIS — G89.18 POSTOPERATIVE PAIN: Primary | ICD-10-CM

## 2019-08-09 PROCEDURE — 99024 POSTOP FOLLOW-UP VISIT: CPT | Performed by: OBSTETRICS & GYNECOLOGY

## 2019-08-09 RX ORDER — IBUPROFEN 600 MG/1
600 TABLET, FILM COATED ORAL EVERY 6 HOURS PRN
Qty: 50 TABLET | Refills: 0 | Status: SHIPPED | OUTPATIENT
Start: 2019-08-09 | End: 2021-02-15

## 2019-08-09 RX ORDER — HYDROCODONE BITARTRATE AND ACETAMINOPHEN 5; 325 MG/1; MG/1
1-2 TABLET ORAL EVERY 6 HOURS PRN
Qty: 12 TABLET | Refills: 0 | Status: SHIPPED | OUTPATIENT
Start: 2019-08-09 | End: 2019-08-15

## 2019-08-09 ASSESSMENT — PAIN SCALES - GENERAL: PAINLEVEL: MILD PAIN (3)

## 2019-08-09 NOTE — PROGRESS NOTES
Amanda is seen 6 days postop from a  section for fetal intolerance of labor.  Overall is doing well but continues with issues of incisional pain.  Her pain is controlled well with every 6 hour ibuprofen and one Norco.  She is tried to wean off the Norco but becomes too uncomfortable.  Denies fever chills sweats.  Bladder and bowel function normal.  Breast-feeding.    Physical exam:  Patient is alert orientated x3.  Moves cautiously as expected.  Blood pressure 130/88, pulse 78, afebrile  Abdomen obese soft, incision looks good with no sign of infection or seroma.    Assessment:  1.  6 days status post  section  2.  Postoperative pain.    Plan:  Continue with every 6 hour ibuprofen.  Over the next 2 days encouraged to wean off the use of Norco during the day and substitute with acetaminophen thousand milligrams every 6 hours.  May use the Norco then at night for an additional couple of days.  Prescription for Norco 5 mg renewed quantity 12.

## 2019-08-09 NOTE — NURSING NOTE
Patient is here for post-op concerns, states is still having pain. Patient would like a refill of pain medication,  only has 1 pain pill left.   Rosalina Funk LPN .............8/9/2019     2:47 PM      Patient's last menstrual period was 10/31/2018 (exact date).  Medication Reconciliation: complete    Rosalina Funk LPN  8/9/2019 2:52 PM

## 2019-08-12 ENCOUNTER — MYC MEDICAL ADVICE (OUTPATIENT)
Dept: FAMILY MEDICINE | Facility: OTHER | Age: 29
End: 2019-08-12

## 2019-08-12 NOTE — PROGRESS NOTES
Outpatient Lactation Visit    Amanda Mckeon  [id number]    Consultation Date: 2019     Reason for Lactation Referral: Initial Lactation Consult    Baby's : 1990    Baby's Current Age: 29 year old  Baby's Gestational Age: Gestational Age: <None>    Primary Care Provider: No Ref-Primary, Physician    Presenting Problem (concerns as stated by parent): none    MATERNAL HISTORY   History of Breast Surgery: Bilateral breast reduction  Breast Feeding History: This is first child  Maternal Meds: daily prenatal vitamin  Pregnancy Complications: none. Delivery via primary  section    MATERNAL ASSESSMENT    Breast Size: average, symmetrical, soft after feeding and filling prior to feeding  Nipple Appearance - Left: red, blistered, education on further healing techniques provided  Nipple Appearance - Right: red, blistered, with signs of healing, education on further healing techniques provided  Nipple Erectility - Left: erect with stimulation  Nipple Erectility - Right: erect with stimulation  Areolas Compressibility: soft  Nipple Size: average  Special Equipment Used: Nipple shield  Day mother reports milk came in:  Day 3-4    INFANT ASSESSMENT    Oral Anatomy  Mouth: normal  Palate: normal  Jaw: normal  Tongue: normal  Frenulum: normal   Digital Suck Exam: root    FEEDING     Position:  Cross cradle  Effort to Latch: awake and alert, latched easily with nipple shield- able to latch without shield but cannot hold breast tissue in pulled back in mouth  Duration of Breast Feeding: Right Breast: 30; Left Breast: 30    Volume of Intake:    Birth Weight: 6 pounds 11 ounces    Hospital discharge weight: 6 pounds 1 ounce    Today's Weight 6 pounds 1.5 ounces    Total Intake: 0.5 ounces  Output: 2-3 soil diapers in last 24 hours, 3-4 wet diapers in last 24 hours    LATCH Score:   Latch: 2 - Good Latch  Audible Swallowin - Few  Type of Nipple: (Breast/Nipple) 1 - Flat  Comfort: 1 - Filling, small  blisters, mild/mod pain  Hold: 2 - No Assist   Total LATCH Score:  7    FEEDING PLAN    Home Feeding Plan: Continue to feed on demand when  elicits feeding cues with deep latch.  Babe should be eating 8-12 times in a 24 hour period.  Exclusivity explained and encouraged in the early weeks to establish breastfeeding and order in milk supply.  Rooming-in encouraged with explanation of the benefits.  Continue to apply expressed breast milk and Lanolin cream to nipples after feedings for healing and comfort.  Postpartum breastfeeding assessment completed and education provided.  Items included in the education are:     proper positioning and latch    effectiveness of feeding    manual expression    handling and storing breastmilk    maintenance of breastfeeding for the first 6 months    sign/symptoms of infant feeding issues requiring referral to qualified health care provider    LACTATION COMMENTS   Deep latch explained for proper positioning of breast in infant's mouth, maximizing milk transfer and comfort.  Reassurance and encouragement provided in regard to mom's concerns about milk supply.  Follow-up support information provided.  Weight check in Clinic 2019  Parents plan to keep [place] Well-Child Check with Dr. Farooq Benitez as scheduled for 2 week well child check.      Face-to-face Time: 30 minutes with assessment and education.    Di Gamez  2019  1:12 PM

## 2019-08-15 ENCOUNTER — OFFICE VISIT (OUTPATIENT)
Dept: OBGYN | Facility: OTHER | Age: 29
End: 2019-08-15
Attending: OBSTETRICS & GYNECOLOGY
Payer: COMMERCIAL

## 2019-08-15 VITALS
WEIGHT: 200 LBS | SYSTOLIC BLOOD PRESSURE: 114 MMHG | BODY MASS INDEX: 34.33 KG/M2 | DIASTOLIC BLOOD PRESSURE: 82 MMHG | TEMPERATURE: 97.6 F

## 2019-08-15 DIAGNOSIS — R30.0 DYSURIA: Primary | ICD-10-CM

## 2019-08-15 DIAGNOSIS — Z98.890 POSTOPERATIVE STATE: ICD-10-CM

## 2019-08-15 LAB
ALBUMIN UR-MCNC: NEGATIVE MG/DL
APPEARANCE UR: CLEAR
BACTERIA #/AREA URNS HPF: ABNORMAL /HPF
BILIRUB UR QL STRIP: NEGATIVE
COLOR UR AUTO: YELLOW
GLUCOSE UR STRIP-MCNC: NEGATIVE MG/DL
HGB UR QL STRIP: ABNORMAL
KETONES UR STRIP-MCNC: NEGATIVE MG/DL
LEUKOCYTE ESTERASE UR QL STRIP: ABNORMAL
NITRATE UR QL: NEGATIVE
NON-SQ EPI CELLS #/AREA URNS LPF: ABNORMAL /LPF
PH UR STRIP: 6 PH (ref 5–9)
RBC #/AREA URNS AUTO: ABNORMAL /HPF
SOURCE: ABNORMAL
SP GR UR STRIP: 1.02 (ref 1–1.03)
UROBILINOGEN UR STRIP-ACNC: 0.2 EU/DL (ref 0.2–1)
WBC #/AREA URNS AUTO: ABNORMAL /HPF

## 2019-08-15 PROCEDURE — 99024 POSTOP FOLLOW-UP VISIT: CPT | Performed by: OBSTETRICS & GYNECOLOGY

## 2019-08-15 PROCEDURE — 81001 URINALYSIS AUTO W/SCOPE: CPT | Mod: ZL | Performed by: OBSTETRICS & GYNECOLOGY

## 2019-08-15 ASSESSMENT — PATIENT HEALTH QUESTIONNAIRE - PHQ9: SUM OF ALL RESPONSES TO PHQ QUESTIONS 1-9: 0

## 2019-08-15 ASSESSMENT — PAIN SCALES - GENERAL: PAINLEVEL: MILD PAIN (2)

## 2019-08-15 NOTE — PROGRESS NOTES
Amanda Mckeon presents todayfor a postop checkup at 2 weeks after  section    S: Bowels and bladder are functioning normally, no abnormal bleeding or pain. No concerns about the incision(s).    Current Outpatient Medications   Medication     acyclovir (ZOVIRAX) 800 MG tablet     fluticasone (FLONASE) 50 MCG/ACT spray     folic acid (FOLVITE) 1 MG tablet     ibuprofen (ADVIL/MOTRIN) 600 MG tablet     levothyroxine (SYNTHROID/LEVOTHROID) 100 MCG tablet     No current facility-administered medications for this visit.      Allergies   Allergen Reactions     Amoxicillin Hives     Childhood     Past Medical History:   Diagnosis Date     Cyst of right ovary     2016     Herpesviral vesicular dermatitis     2016     Hypothyroidism     2015     Past Surgical History:   Procedure Laterality Date     ADENOIDECTOMY      as a child     AS SHOULDER ARTHROSCOPY, DX Right     Dr. Bolivar - age 12      SECTION N/A 8/3/2019    Procedure:  SECTION;  Surgeon: Omar Flower MD;  Location: [facility] OR     MAMMOPLASTY REDUCTION      2007     PE TUBES      as a child       COMPLETE REVIEW OF SYSTEMS: some dysuria        O: /82 (BP Location: Right arm)   Temp 97.6  F (36.4  C)   Wt 90.7 kg (200 lb)   LMP 10/31/2018 (Exact Date)   Breastfeeding? Yes   BMI 34.33 kg/m   Body mass index is 34.33 kg/m .    EXAM:  NAD  Incisions is CDI, one staple removed from left lateral aspect.      I/P:  Stable postop  Return in four weeks for postpartum exam to her primary.  Considering Mirena IUD for birth control.    Omar Flower MD FACOG  11:17 AM 8/15/2019

## 2019-08-16 ENCOUNTER — MYC MEDICAL ADVICE (OUTPATIENT)
Dept: FAMILY MEDICINE | Facility: OTHER | Age: 29
End: 2019-08-16

## 2019-08-20 ENCOUNTER — MYC MEDICAL ADVICE (OUTPATIENT)
Dept: FAMILY MEDICINE | Facility: OTHER | Age: 29
End: 2019-08-20

## 2019-08-20 NOTE — TELEPHONE ENCOUNTER
Patient will bring FMLA form on Thursday to be filled out. Clover Cordon LPN on 8/20/2019 at 3:37 PM'

## 2019-09-16 ENCOUNTER — PRENATAL OFFICE VISIT (OUTPATIENT)
Dept: FAMILY MEDICINE | Facility: OTHER | Age: 29
End: 2019-09-16
Attending: FAMILY MEDICINE
Payer: COMMERCIAL

## 2019-09-16 ENCOUNTER — HOSPITAL ENCOUNTER (OUTPATIENT)
Dept: GENERAL RADIOLOGY | Facility: OTHER | Age: 29
Discharge: HOME | End: 2019-09-16
Attending: FAMILY MEDICINE | Admitting: FAMILY MEDICINE
Payer: COMMERCIAL

## 2019-09-16 VITALS
RESPIRATION RATE: 20 BRPM | TEMPERATURE: 98.6 F | DIASTOLIC BLOOD PRESSURE: 80 MMHG | WEIGHT: 199 LBS | OXYGEN SATURATION: 97 % | HEIGHT: 64 IN | BODY MASS INDEX: 33.97 KG/M2 | SYSTOLIC BLOOD PRESSURE: 126 MMHG | HEART RATE: 80 BPM

## 2019-09-16 DIAGNOSIS — R22.41 FOOT MASS, RIGHT: ICD-10-CM

## 2019-09-16 DIAGNOSIS — B00.1 RECURRENT COLD SORES: ICD-10-CM

## 2019-09-16 DIAGNOSIS — E03.9 HYPOTHYROIDISM, UNSPECIFIED TYPE: ICD-10-CM

## 2019-09-16 DIAGNOSIS — O24.419 GESTATIONAL DIABETES MELLITUS (GDM) IN THIRD TRIMESTER, GESTATIONAL DIABETES METHOD OF CONTROL UNSPECIFIED: ICD-10-CM

## 2019-09-16 PROCEDURE — 73630 X-RAY EXAM OF FOOT: CPT | Mod: RT

## 2019-09-16 PROCEDURE — 99207 ZZC POST-PARTUM 6 WK VISIT - GICH ONLY: CPT | Performed by: FAMILY MEDICINE

## 2019-09-16 RX ORDER — FOLIC ACID 1 MG/1
1 TABLET ORAL DAILY
Qty: 100 TABLET | Refills: 3 | Status: SHIPPED | OUTPATIENT
Start: 2019-09-16 | End: 2020-12-08

## 2019-09-16 RX ORDER — ACYCLOVIR 800 MG/1
800 TABLET ORAL
Qty: 50 TABLET | Refills: 3 | Status: SHIPPED | OUTPATIENT
Start: 2019-09-16 | End: 2021-01-21

## 2019-09-16 RX ORDER — LEVOTHYROXINE SODIUM 100 UG/1
100 TABLET ORAL DAILY
Qty: 90 TABLET | Refills: 3 | Status: SHIPPED | OUTPATIENT
Start: 2019-09-16 | End: 2020-01-02

## 2019-09-16 RX ORDER — ACETAMINOPHEN AND CODEINE PHOSPHATE 120; 12 MG/5ML; MG/5ML
0.35 SOLUTION ORAL DAILY
Qty: 84 TABLET | Refills: 3 | Status: SHIPPED | OUTPATIENT
Start: 2019-09-16 | End: 2019-09-23

## 2019-09-16 ASSESSMENT — ANXIETY QUESTIONNAIRES
6. BECOMING EASILY ANNOYED OR IRRITABLE: NOT AT ALL
GAD7 TOTAL SCORE: 0
7. FEELING AFRAID AS IF SOMETHING AWFUL MIGHT HAPPEN: NOT AT ALL
2. NOT BEING ABLE TO STOP OR CONTROL WORRYING: NOT AT ALL
IF YOU CHECKED OFF ANY PROBLEMS ON THIS QUESTIONNAIRE, HOW DIFFICULT HAVE THESE PROBLEMS MADE IT FOR YOU TO DO YOUR WORK, TAKE CARE OF THINGS AT HOME, OR GET ALONG WITH OTHER PEOPLE: NOT DIFFICULT AT ALL
1. FEELING NERVOUS, ANXIOUS, OR ON EDGE: NOT AT ALL
3. WORRYING TOO MUCH ABOUT DIFFERENT THINGS: NOT AT ALL
5. BEING SO RESTLESS THAT IT IS HARD TO SIT STILL: NOT AT ALL

## 2019-09-16 ASSESSMENT — PAIN SCALES - GENERAL: PAINLEVEL: NO PAIN (0)

## 2019-09-16 ASSESSMENT — PATIENT HEALTH QUESTIONNAIRE - PHQ9
5. POOR APPETITE OR OVEREATING: NOT AT ALL
SUM OF ALL RESPONSES TO PHQ QUESTIONS 1-9: 0

## 2019-09-16 ASSESSMENT — MIFFLIN-ST. JEOR: SCORE: 1612.66

## 2019-09-16 NOTE — LETTER
September 16, 2019      Amanda PAIZ Mike  [id number] [address] [id number]        To Whom It May Concern,      Amanda is healthy and may use the gym at work.      Sincerely,        Liza Ordoñez MD

## 2019-09-16 NOTE — PROGRESS NOTES
"6 week Postpartum Visit Note    S:  Ms. Amanda Mckeon is a 28 year old  here for her 6-week postpartum checkup.   - Had a  on 8/3/19.  - Infant gender:  boy, weight 6 pounds 11 oz.  - Feeding Method:   and Bottlefed.  Complications reported with feeding:  poor weight gain.  Baby was recently diagnosed with pyloric stenosis and required surgery.  He had lost weight and now is doing better, regaining what he had lost.  She has also had a prior breast reduction and has had milk supply issues.  They are breast feeding and supplementing with formula.    - Bleeding:  None.  Duration:  4 weeks.  Menses resumed:  No  - Bowel/Urinary problems:  No     Has a lump on the top of her right foot.  It does get a little uncomfortable when shoes rub on it.    She has a history of recurrent cold sores and would like a refill of the acyclovir she uses for these.      She did have GDM during this pregnancy, diet controlled.  She also has a history of hypothyroidism.    PHQ-9 score:   PHQ-9 SCORE 2019 8/15/2019 2019   PHQ-9 Total Score 0 0 0       PHQ-2 Score:     PHQ-2 (  [employer]) 2019   Q1: Little interest or pleasure in doing things 0 0   Q2: Feeling down, depressed or hopeless 0 0   PHQ-2 Score 0 0           - Contraception Planned:  oral contraceptive  - She  has not had intercourse since delivery..    - Current tobacco use:  No  - Hx of Abuse:  No  ================================================================  ROS: 10 point ROS neg other than the symptoms noted above in the HPI.     O:  /80 (BP Location: Right arm, Patient Position: Sitting, Cuff Size: Adult Large)   Pulse 80   Temp 98.6  F (37  C) (Tympanic)   Resp 20   Ht 1.626 m (5' 4\")   Wt 90.3 kg (199 lb)   LMP 10/31/2018 (Exact Date)   SpO2 97%   BMI 34.16 kg/m    Gen: Well-appearing, NAD  Psych:  NEGATIVE  Breast: Lactating, Non-tender and No S/S of yeast or mastitis  Abd:  Benign, Soft, flat, non-tender, " No masses, organomegaly and Diastasis less than 1-2 FB  Inc:   well healed   Pelvic: Perineum intact.  Bimanual exam showed uterus normal in size, non-tender.  No adnexal masses.   Musculoskeletal:  On the dorsum of her right foot is a firm 1 cm lump.    XR FOOT RT G/E 3 VW     HISTORY: 28 years Female Foot mass, right     COMPARISON: None     TECHNIQUE: 3 views right foot     FINDINGS: A marker points to the lateral aspect of the foot and dorsum  of the foot at the tarsal metatarsal region. There is no osseous  change. Joint spaces are congruent. There is no evidence of an  osteolytic or osteosclerotic process.     No gross soft tissue abnormality is evident.                                                                      IMPRESSION: Negative study.     MYAH STRANGE MD    A/P:  Ms. Amanda Mckeon is a 28 year old  here for 6 week postpartum visit after . Doing well.    (Z39.2) Encounter for postpartum visit  (primary encounter diagnosis)  Comment:   Plan:   - Contraception: Norethindrone prescription provided.  - Feeding: breast & formula  - Follow-up: as needed.    (O24.419) Gestational diabetes mellitus (GDM) in third trimester, gestational diabetes method of control unspecified  Comment:   Plan: Glucose        Random glucose today.    (Z78.9) Contraception  Comment:   Plan: norethindrone (MICRONOR) 0.35 MG tablet, folic         acid (FOLVITE) 1 MG tablet        Prescription for micronor given.  Folic acid also refilled.  Discussed that at some point if no longer breast feeding, may contact us for a prescription for a traditional oral contraceptives.    (E03.9) Hypothyroidism, unspecified type  Comment: stable.  Plan: Thyrotropin [facility], levothyroxine         (SYNTHROID/LEVOTHROID) 100 MCG tablet        Repeat TSH today.  Levothyroxine refilled.    (R22.41) Foot mass, right  Comment: x-ray normal.  Likely a ganglion cyst.    Plan: CANCELED: XR Foot 3 Views Standing Right        Offered  referral to Orthopedics to consider excision of mass.  She declines at this time, but may notify us if she would like a referral at a later date.    (B00.1) Recurrent cold sores  Comment:   Plan: acyclovir (ZOVIRAX) 800 MG tablet        Acyclovir refilled.        Liza Ordoñez MD , MD    9/16/2019 3:40 PM

## 2019-09-17 ENCOUNTER — MYC MEDICAL ADVICE (OUTPATIENT)
Dept: FAMILY MEDICINE | Facility: OTHER | Age: 29
End: 2019-09-17

## 2019-09-17 ASSESSMENT — ANXIETY QUESTIONNAIRES: GAD7 TOTAL SCORE: 0

## 2019-09-19 ENCOUNTER — MYC MEDICAL ADVICE (OUTPATIENT)
Dept: FAMILY MEDICINE | Facility: OTHER | Age: 29
End: 2019-09-19

## 2019-09-19 NOTE — TELEPHONE ENCOUNTER
New message made under patients chart not mothers. Closing note  Carly Hopper LPN ....................  9/19/2019   1:29 PM

## 2019-09-20 ENCOUNTER — MYC MEDICAL ADVICE (OUTPATIENT)
Dept: FAMILY MEDICINE | Facility: OTHER | Age: 29
End: 2019-09-20

## 2019-09-20 NOTE — TELEPHONE ENCOUNTER
Patient would like to know if she may start birth control. See [employer] message. Please advise if patient needs to have a follow up appointment. Shaista Bliss LPN .............9/20/2019  10:16 AM

## 2019-10-22 ENCOUNTER — MYC MEDICAL ADVICE (OUTPATIENT)
Dept: FAMILY MEDICINE | Facility: OTHER | Age: 29
End: 2019-10-22

## 2019-10-22 DIAGNOSIS — M79.671 FOOT PAIN, BILATERAL: Primary | ICD-10-CM

## 2019-10-22 DIAGNOSIS — M79.672 FOOT PAIN, BILATERAL: Primary | ICD-10-CM

## 2019-11-06 NOTE — PROGRESS NOTES
Assessment done, VSS. See flowsheet for further info. Resting in bed,  at bedside. Irregular contractions, FHT's Category 1. Will continue to monitor.    Straight, Heterosexual

## 2019-12-31 ENCOUNTER — ALLIED HEALTH/NURSE VISIT (OUTPATIENT)
Dept: FAMILY MEDICINE | Facility: OTHER | Age: 29
End: 2019-12-31
Attending: FAMILY MEDICINE
Payer: COMMERCIAL

## 2019-12-31 DIAGNOSIS — E03.9 HYPOTHYROIDISM, UNSPECIFIED TYPE: ICD-10-CM

## 2019-12-31 DIAGNOSIS — Z23 NEED FOR VACCINATION: Primary | ICD-10-CM

## 2019-12-31 DIAGNOSIS — O24.419 GESTATIONAL DIABETES MELLITUS (GDM) IN THIRD TRIMESTER, GESTATIONAL DIABETES METHOD OF CONTROL UNSPECIFIED: ICD-10-CM

## 2019-12-31 LAB
GLUCOSE SERPL-MCNC: 115 MG/DL (ref 70–105)
TSH SERPL DL<=0.05 MIU/L-ACNC: 0.3 IU/ML (ref 0.34–5.6)

## 2019-12-31 PROCEDURE — 36415 COLL VENOUS BLD VENIPUNCTURE: CPT | Mod: ZL | Performed by: FAMILY MEDICINE

## 2019-12-31 PROCEDURE — 90471 IMMUNIZATION ADMIN: CPT

## 2019-12-31 PROCEDURE — 84443 ASSAY THYROID STIM HORMONE: CPT | Mod: ZL | Performed by: FAMILY MEDICINE

## 2019-12-31 PROCEDURE — 82947 ASSAY GLUCOSE BLOOD QUANT: CPT | Mod: ZL | Performed by: FAMILY MEDICINE

## 2019-12-31 PROCEDURE — 90716 VAR VACCINE LIVE SUBQ: CPT

## 2019-12-31 NOTE — PROGRESS NOTES
Immunization Documentation  Verified patient's first and last name, and . Stated reason for visit today is to receive Varicella vaccine(s). Denied any concerns with previous immunizations. Allergies reviewed.  Screening Questionnaire Adult Immunization completed (see below). VIS handout(s) reviewed and given to take home. Varicella prepared and administered subcutaneous per standing order. Administration documented in IMMUNIZATIONS (see flowsheet and order for further information). Instructed to wait in lobby for 15 minutes post-injection and notify RN immediately of any adverse reaction.     Screening Questionnaire for Adult Immunization    Are you sick today?   No   Do you have allergies to medications, food, a vaccine component, or latex?   No   Have you ever had a serious reaction after receiving a vaccination?   No   Have you received any vaccinations in the past 4 weeks?   No     Immunization questionnaire answers were all negative.      Marina WELLERN, RN on 2019 at 8:44 AM

## 2020-01-02 ENCOUNTER — MYC MEDICAL ADVICE (OUTPATIENT)
Dept: FAMILY MEDICINE | Facility: OTHER | Age: 30
End: 2020-01-02

## 2020-01-02 DIAGNOSIS — E03.9 HYPOTHYROIDISM, UNSPECIFIED TYPE: ICD-10-CM

## 2020-01-02 RX ORDER — LEVOTHYROXINE SODIUM 88 UG/1
88 TABLET ORAL DAILY
Qty: 90 TABLET | Refills: 0 | Status: SHIPPED | OUTPATIENT
Start: 2020-01-02 | End: 2020-03-04

## 2020-01-02 NOTE — PROGRESS NOTES
TSH returned low; adjusting levothyroxine and order placed for future labs in 4-6 weeks.  Jesenia David, DO

## 2020-03-03 DIAGNOSIS — E03.9 HYPOTHYROIDISM, UNSPECIFIED TYPE: ICD-10-CM

## 2020-03-03 LAB
T4 FREE SERPL-MCNC: 1.04 NG/DL (ref 0.6–1.6)
TSH SERPL DL<=0.05 MIU/L-ACNC: 10.91 IU/ML (ref 0.34–5.6)

## 2020-03-03 PROCEDURE — 36415 COLL VENOUS BLD VENIPUNCTURE: CPT | Mod: ZL | Performed by: FAMILY MEDICINE

## 2020-03-03 PROCEDURE — 84439 ASSAY OF FREE THYROXINE: CPT | Mod: ZL | Performed by: FAMILY MEDICINE

## 2020-03-03 PROCEDURE — 84443 ASSAY THYROID STIM HORMONE: CPT | Mod: ZL | Performed by: FAMILY MEDICINE

## 2020-03-04 DIAGNOSIS — E03.9 HYPOTHYROIDISM, UNSPECIFIED TYPE: ICD-10-CM

## 2020-03-04 RX ORDER — LEVOTHYROXINE SODIUM 100 UG/1
100 TABLET ORAL DAILY
Qty: 90 TABLET | Refills: 1 | Status: SHIPPED | OUTPATIENT
Start: 2020-03-04 | End: 2020-05-22

## 2020-03-04 NOTE — PROGRESS NOTES
Medication adjusted back to 100mcg daily due to TSH level - continues to normalize postpartum.  I would check again in 4-6 weeks time.  If continues to bounce, may need to alternate 88mcg and 100mcg every other day.  Jesenia David, DO on 3/4/2020 at 8:59 AM

## 2020-03-31 ENCOUNTER — MYC MEDICAL ADVICE (OUTPATIENT)
Dept: FAMILY MEDICINE | Facility: OTHER | Age: 30
End: 2020-03-31

## 2020-05-05 ENCOUNTER — MYC MEDICAL ADVICE (OUTPATIENT)
Dept: FAMILY MEDICINE | Facility: OTHER | Age: 30
End: 2020-05-05

## 2020-05-22 ENCOUNTER — OFFICE VISIT (OUTPATIENT)
Dept: FAMILY MEDICINE | Facility: OTHER | Age: 30
End: 2020-05-22
Attending: FAMILY MEDICINE
Payer: COMMERCIAL

## 2020-05-22 DIAGNOSIS — E03.9 HYPOTHYROIDISM, UNSPECIFIED TYPE: ICD-10-CM

## 2020-05-22 DIAGNOSIS — R22.0 MANDIBULAR MASS: Primary | ICD-10-CM

## 2020-05-22 LAB
T4 FREE SERPL-MCNC: 0.78 NG/DL (ref 0.6–1.6)
TSH SERPL DL<=0.05 MIU/L-ACNC: 13.51 IU/ML (ref 0.34–5.6)

## 2020-05-22 PROCEDURE — 99213 OFFICE O/P EST LOW 20 MIN: CPT | Performed by: FAMILY MEDICINE

## 2020-05-22 PROCEDURE — 84439 ASSAY OF FREE THYROXINE: CPT | Mod: ZL | Performed by: FAMILY MEDICINE

## 2020-05-22 PROCEDURE — 84443 ASSAY THYROID STIM HORMONE: CPT | Mod: ZL | Performed by: FAMILY MEDICINE

## 2020-05-22 PROCEDURE — 36415 COLL VENOUS BLD VENIPUNCTURE: CPT | Mod: ZL | Performed by: FAMILY MEDICINE

## 2020-05-22 RX ORDER — LEVOTHYROXINE SODIUM 112 UG/1
112 TABLET ORAL DAILY
Qty: 30 TABLET | Refills: 11 | Status: SHIPPED | OUTPATIENT
Start: 2020-05-22 | End: 2020-08-14

## 2020-05-22 ASSESSMENT — ENCOUNTER SYMPTOMS
SHORTNESS OF BREATH: 0
COUGH: 0
FEVER: 0
FATIGUE: 0

## 2020-05-22 ASSESSMENT — PAIN SCALES - GENERAL: PAINLEVEL: NO PAIN (0)

## 2020-05-22 ASSESSMENT — MIFFLIN-ST. JEOR: SCORE: 1570.46

## 2020-05-22 NOTE — PROGRESS NOTES
SUBJECTIVE:   Nursing Notes:   Carly Hopper LPN  2020  4:00 PM  Sign at exiting of workspace  Patient presents to clinic with lump on right side of jaw.  Carly Hopper LPN ....................  2020   4:00 PM        Amanda Mckeon is a 29 year old female who presents to clinic today for a lump on the right side of her jaw.  Has been there for a month.  Is able to move it around.  No issues with teeth.  No pain.  No rashes or skin irritation.  No lymphadenopathy has been noted elsewhere.  No night sweats.  No unexplained weight loss.      HPI    I personally reviewed medications/allergies/history listed below:    Patient Active Problem List    Diagnosis Date Noted     S/P  section 2019     Priority: Medium     Pregnancy 2019     Priority: Medium     Encounter for triage in pregnant patient 2019     Priority: Medium     Gestational diabetes mellitus (GDM) in third trimester, gestational diabetes method of control unspecified 2019     Priority: Medium     Pregnancy, unspecified gestational age 2018     Priority: Medium     Hypothyroidism, unspecified type 08/10/2018     Priority: Medium     Chronic seasonal allergic rhinitis, unspecified trigger 08/10/2018     Priority: Medium     Encounter for contraceptive management, unspecified type 08/10/2018     Priority: Medium     Ganglion cyst 2017     Priority: Medium     Recurrent cold sores 2016     Priority: Medium     Past Medical History:   Diagnosis Date     Cyst of right ovary     2016     Herpesviral vesicular dermatitis     2016     Hypothyroidism     2015      Past Surgical History:   Procedure Laterality Date     ADENOIDECTOMY      as a child     AS SHOULDER ARTHROSCOPY, DX Right     Dr. Bolivar - age 12      SECTION N/A 8/3/2019    Procedure:  SECTION;  Surgeon: Omar Flower MD;  Location: [facility] OR     MAMMOPLASTY REDUCTION           PE TUBES      as a  "child     Family History   Problem Relation Age of Onset     Family History Negative Mother      Lupus Father      Family History Negative Brother      Kidney Disease Maternal Grandmother      Bladder Cancer Maternal Grandfather      Family History Negative Paternal Grandmother      Diabetes Paternal Grandfather      Social History     Tobacco Use     Smoking status: Never Smoker     Smokeless tobacco: Never Used   Substance Use Topics     Alcohol use: No     Social History     Social History Narrative    .  Works as an .         - Andrew - works for [employer] -  at [employer].     Current Outpatient Medications   Medication Sig Dispense Refill     acyclovir (ZOVIRAX) 800 MG tablet Take 1 tablet (800 mg) by mouth 5 times daily 50 tablet 3     fluticasone (FLONASE) 50 MCG/ACT spray Spray 2 sprays into both nostrils daily 1 Bottle 11     folic acid (FOLVITE) 1 MG tablet Take 1 tablet (1 mg) by mouth daily 100 tablet 3     ibuprofen (ADVIL/MOTRIN) 600 MG tablet Take 1 tablet (600 mg) by mouth every 6 hours as needed for moderate pain 50 tablet 0     levothyroxine (SYNTHROID/LEVOTHROID) 100 MCG tablet Take 1 tablet (100 mcg) by mouth daily 90 tablet 1     norgestrel-ethinyl estradiol (LO/OVRAL) 0.3-30 MG-MCG tablet Take 1 tablet by mouth daily 84 tablet 3     order for DME Equipment being ordered: Custom foot orthotics - arch supports.  [facility]. 2 each 0     Allergies   Allergen Reactions     Amoxicillin Hives     Childhood       Review of Systems   Constitutional: Negative for fatigue and fever.   Respiratory: Negative for cough and shortness of breath.    Endocrine: Negative for cold intolerance and heat intolerance.   Psychiatric/Behavioral: Negative for mood changes.        OBJECTIVE:     BP (P) 126/88   Pulse (P) 88   Temp (P) 98.7  F (37.1  C)   Resp (P) 12   Ht (P) 1.626 m (5' 4\")   Wt (P) 86.5 kg (190 lb 12.8 oz)   LMP 04/27/2020   Breastfeeding " No   BMI (P) 32.75 kg/m    Body mass index is 32.75 kg/m  (pended).  Physical Exam  Constitutional:       Appearance: Normal appearance.   HENT:      Head: Normocephalic.      Comments: Overlying her right mandibular/parotid region is a small, slightly larger than pea-sized mobile mass noted.  Eyes:      Pupils: Pupils are equal, round, and reactive to light.   Neck:      Musculoskeletal: Normal range of motion and neck supple. No neck rigidity or muscular tenderness.   Cardiovascular:      Rate and Rhythm: Normal rate and regular rhythm.      Pulses: Normal pulses.      Heart sounds: No murmur.   Pulmonary:      Effort: Pulmonary effort is normal.      Breath sounds: Normal breath sounds. No wheezing, rhonchi or rales.   Lymphadenopathy:      Cervical: No cervical adenopathy (no supraclavicular or axillary lymphadenopathy noted.).   Skin:     Capillary Refill: Capillary refill takes less than 2 seconds.   Neurological:      Mental Status: She is alert.   Psychiatric:         Mood and Affect: Mood normal.         Behavior: Behavior normal.           PHQ-9 SCORE 8/7/2019 8/15/2019 9/16/2019   PHQ-9 Total Score 0 0 0       PHQ-2 Score:     PHQ-2 ( 1999 Pfizer) 5/22/2020 9/16/2019   Q1: Little interest or pleasure in doing things 0 0   Q2: Feeling down, depressed or hopeless 0 0   PHQ-2 Score 0 0       SANDY-7 SCORE 5/14/2019 8/7/2019 9/16/2019   Total Score 0 0 0       I personally reviewed results withpatient as listed below:   Diagnostic Test Results:  none     ASSESSMENT/PLAN:       ICD-10-CM    1. Mandibular mass  R22.0 US Head Neck Soft Tissue       1.  Will evaluate further with ultrasound of her head/neck first.  Discussed if these images are not adequate, may need CT for further imaging.  May need referral to ENT based on results.    Liza Ordoñez MD  [facility] AND [facility]    Portions of this dictation were created using the Dragon Nuance voice recognition system. Proofreading was completed  but there may be errors in text.

## 2020-05-22 NOTE — NURSING NOTE
Patient presents to clinic with lump on right side of jaw.  Carly Hopper LPN ....................  5/22/2020   4:00 PM

## 2020-05-28 ENCOUNTER — HOSPITAL ENCOUNTER (OUTPATIENT)
Dept: ULTRASOUND IMAGING | Facility: OTHER | Age: 30
Discharge: HOME | End: 2020-05-28
Attending: FAMILY MEDICINE | Admitting: FAMILY MEDICINE
Payer: COMMERCIAL

## 2020-05-28 DIAGNOSIS — R22.0 MANDIBULAR MASS: ICD-10-CM

## 2020-05-28 PROCEDURE — 76536 US EXAM OF HEAD AND NECK: CPT

## 2020-08-03 DIAGNOSIS — Z78.9 USES BIRTH CONTROL: ICD-10-CM

## 2020-08-05 ENCOUNTER — MYC MEDICAL ADVICE (OUTPATIENT)
Dept: FAMILY MEDICINE | Facility: OTHER | Age: 30
End: 2020-08-05

## 2020-08-05 DIAGNOSIS — Z30.41 ENCOUNTER FOR BIRTH CONTROL PILLS MAINTENANCE: Primary | ICD-10-CM

## 2020-08-05 RX ORDER — NORGESTREL AND ETHINYL ESTRADIOL 0.3-0.03MG
KIT ORAL
Qty: 84 TABLET | Refills: 3 | OUTPATIENT
Start: 2020-08-05

## 2020-08-05 NOTE — TELEPHONE ENCOUNTER
Jocelyn/Makeda refilled on 9/23/2019 #84 x 3 reffills to [facility]  LOV: 5/22/2020  Randi Madrigal RN on 8/5/2020 at 11:30 AM

## 2020-08-14 DIAGNOSIS — E03.9 HYPOTHYROIDISM, UNSPECIFIED TYPE: ICD-10-CM

## 2020-08-14 LAB
T4 FREE SERPL-MCNC: 0.96 NG/DL (ref 0.6–1.6)
TSH SERPL DL<=0.05 MIU/L-ACNC: 5.69 IU/ML (ref 0.34–5.6)

## 2020-08-14 PROCEDURE — 84439 ASSAY OF FREE THYROXINE: CPT | Mod: ZL | Performed by: FAMILY MEDICINE

## 2020-08-14 PROCEDURE — 36415 COLL VENOUS BLD VENIPUNCTURE: CPT | Mod: ZL | Performed by: FAMILY MEDICINE

## 2020-08-14 PROCEDURE — 84443 ASSAY THYROID STIM HORMONE: CPT | Mod: ZL | Performed by: FAMILY MEDICINE

## 2020-08-14 RX ORDER — LEVOTHYROXINE SODIUM 125 UG/1
125 TABLET ORAL DAILY
Qty: 30 TABLET | Refills: 11 | Status: SHIPPED | OUTPATIENT
Start: 2020-08-14 | End: 2021-01-21

## 2020-12-07 DIAGNOSIS — Z00.00 HEALTH CARE MAINTENANCE: Primary | ICD-10-CM

## 2020-12-08 RX ORDER — FOLIC ACID 1 MG/1
1 TABLET ORAL DAILY
Qty: 100 TABLET | Refills: 3 | Status: SHIPPED | OUTPATIENT
Start: 2020-12-08 | End: 2023-02-02

## 2020-12-08 NOTE — TELEPHONE ENCOUNTER
Folic acid (FOLVITE) 1 MG tablet  Last Written Prescription Date: 09/16/2019.  Last Fill Quantity: 90,   # refills: 3  Last Office Visit: 05/22/2020  Future Office visit:       Routing refill request to provider for review/approval because:  A break in medication.     Unable to complete prescription refill per RNMedication Refill Policy.................... Carmina Peterson RN ....................  12/8/2020   8:28 AM

## 2020-12-12 ENCOUNTER — MYC MEDICAL ADVICE (OUTPATIENT)
Dept: FAMILY MEDICINE | Facility: OTHER | Age: 30
End: 2020-12-12

## 2021-01-21 ENCOUNTER — OFFICE VISIT (OUTPATIENT)
Dept: FAMILY MEDICINE | Facility: OTHER | Age: 31
End: 2021-01-21
Attending: FAMILY MEDICINE
Payer: COMMERCIAL

## 2021-01-21 VITALS
TEMPERATURE: 97 F | OXYGEN SATURATION: 98 % | DIASTOLIC BLOOD PRESSURE: 74 MMHG | RESPIRATION RATE: 18 BRPM | HEIGHT: 65 IN | BODY MASS INDEX: 32.03 KG/M2 | SYSTOLIC BLOOD PRESSURE: 128 MMHG | WEIGHT: 192.25 LBS | HEART RATE: 81 BPM

## 2021-01-21 DIAGNOSIS — J30.2 SEASONAL ALLERGIC RHINITIS, UNSPECIFIED TRIGGER: ICD-10-CM

## 2021-01-21 DIAGNOSIS — B00.1 RECURRENT COLD SORES: ICD-10-CM

## 2021-01-21 DIAGNOSIS — Z13.0 SCREENING FOR DEFICIENCY ANEMIA: ICD-10-CM

## 2021-01-21 DIAGNOSIS — E03.9 HYPOTHYROIDISM, UNSPECIFIED TYPE: ICD-10-CM

## 2021-01-21 DIAGNOSIS — R22.0 LOCALIZED SWELLING, MASS, AND LUMP OF HEAD: ICD-10-CM

## 2021-01-21 DIAGNOSIS — Z30.41 ENCOUNTER FOR BIRTH CONTROL PILLS MAINTENANCE: ICD-10-CM

## 2021-01-21 DIAGNOSIS — Z00.00 HEALTH CARE MAINTENANCE: Primary | ICD-10-CM

## 2021-01-21 DIAGNOSIS — Z12.4 SCREENING FOR CERVICAL CANCER: ICD-10-CM

## 2021-01-21 DIAGNOSIS — Z13.1 SCREENING FOR DIABETES MELLITUS: ICD-10-CM

## 2021-01-21 DIAGNOSIS — D22.9 MULTIPLE ATYPICAL SKIN MOLES: ICD-10-CM

## 2021-01-21 DIAGNOSIS — K13.79 LESION OF PALATE: ICD-10-CM

## 2021-01-21 DIAGNOSIS — Z13.220 SCREENING FOR LIPID DISORDERS: ICD-10-CM

## 2021-01-21 LAB
ALBUMIN SERPL-MCNC: 4.8 G/DL (ref 3.5–5.7)
ALP SERPL-CCNC: 58 U/L (ref 34–104)
ALT SERPL W P-5'-P-CCNC: 22 U/L (ref 7–52)
ANION GAP SERPL CALCULATED.3IONS-SCNC: 9 MMOL/L (ref 3–14)
AST SERPL W P-5'-P-CCNC: 14 U/L (ref 13–39)
BASOPHILS # BLD AUTO: 0.1 10E9/L (ref 0–0.2)
BASOPHILS NFR BLD AUTO: 1 %
BILIRUB SERPL-MCNC: 0.5 MG/DL (ref 0.3–1)
BUN SERPL-MCNC: 15 MG/DL (ref 7–25)
CALCIUM SERPL-MCNC: 9.6 MG/DL (ref 8.6–10.3)
CHLORIDE SERPL-SCNC: 104 MMOL/L (ref 98–107)
CHOLEST SERPL-MCNC: 191 MG/DL
CO2 SERPL-SCNC: 24 MMOL/L (ref 21–31)
CREAT SERPL-MCNC: 0.73 MG/DL (ref 0.6–1.2)
DIFFERENTIAL METHOD BLD: NORMAL
EOSINOPHIL # BLD AUTO: 0.2 10E9/L (ref 0–0.7)
EOSINOPHIL NFR BLD AUTO: 2.2 %
ERYTHROCYTE [DISTWIDTH] IN BLOOD BY AUTOMATED COUNT: 11.9 % (ref 10–15)
GFR SERPL CREATININE-BSD FRML MDRD: >90 ML/MIN/{1.73_M2}
GLUCOSE SERPL-MCNC: 101 MG/DL (ref 70–105)
HBA1C MFR BLD: 5.1 % (ref 4–6)
HCT VFR BLD AUTO: 41.1 % (ref 35–47)
HDLC SERPL-MCNC: 43 MG/DL (ref 23–92)
HGB BLD-MCNC: 14 G/DL (ref 11.7–15.7)
IMM GRANULOCYTES # BLD: 0.1 10E9/L (ref 0–0.4)
IMM GRANULOCYTES NFR BLD: 0.7 %
LDLC SERPL CALC-MCNC: 122 MG/DL
LYMPHOCYTES # BLD AUTO: 2.2 10E9/L (ref 0.8–5.3)
LYMPHOCYTES NFR BLD AUTO: 32.4 %
MCH RBC QN AUTO: 29.5 PG (ref 26.5–33)
MCHC RBC AUTO-ENTMCNC: 34.1 G/DL (ref 31.5–36.5)
MCV RBC AUTO: 87 FL (ref 78–100)
MONOCYTES # BLD AUTO: 0.5 10E9/L (ref 0–1.3)
MONOCYTES NFR BLD AUTO: 8.1 %
NEUTROPHILS # BLD AUTO: 3.7 10E9/L (ref 1.6–8.3)
NEUTROPHILS NFR BLD AUTO: 55.6 %
NONHDLC SERPL-MCNC: 148 MG/DL
PLATELET # BLD AUTO: 374 10E9/L (ref 150–450)
POTASSIUM SERPL-SCNC: 4.1 MMOL/L (ref 3.5–5.1)
PROT SERPL-MCNC: 7.7 G/DL (ref 6.4–8.9)
RBC # BLD AUTO: 4.75 10E12/L (ref 3.8–5.2)
SODIUM SERPL-SCNC: 137 MMOL/L (ref 134–144)
TRIGL SERPL-MCNC: 128 MG/DL
TSH SERPL DL<=0.05 MIU/L-ACNC: 1.7 IU/ML (ref 0.34–5.6)
WBC # BLD AUTO: 6.7 10E9/L (ref 4–11)

## 2021-01-21 PROCEDURE — 85025 COMPLETE CBC W/AUTO DIFF WBC: CPT | Mod: ZL | Performed by: FAMILY MEDICINE

## 2021-01-21 PROCEDURE — 88142 CYTOPATH C/V THIN LAYER: CPT | Performed by: FAMILY MEDICINE

## 2021-01-21 PROCEDURE — 80053 COMPREHEN METABOLIC PANEL: CPT | Mod: ZL | Performed by: FAMILY MEDICINE

## 2021-01-21 PROCEDURE — G0123 SCREEN CERV/VAG THIN LAYER: HCPCS | Performed by: FAMILY MEDICINE

## 2021-01-21 PROCEDURE — 87624 HPV HI-RISK TYP POOLED RSLT: CPT | Mod: ZL | Performed by: FAMILY MEDICINE

## 2021-01-21 PROCEDURE — 84443 ASSAY THYROID STIM HORMONE: CPT | Mod: ZL | Performed by: FAMILY MEDICINE

## 2021-01-21 PROCEDURE — 99395 PREV VISIT EST AGE 18-39: CPT | Performed by: FAMILY MEDICINE

## 2021-01-21 PROCEDURE — 80061 LIPID PANEL: CPT | Mod: ZL | Performed by: FAMILY MEDICINE

## 2021-01-21 PROCEDURE — 36415 COLL VENOUS BLD VENIPUNCTURE: CPT | Mod: ZL | Performed by: FAMILY MEDICINE

## 2021-01-21 PROCEDURE — 83036 HEMOGLOBIN GLYCOSYLATED A1C: CPT | Mod: ZL | Performed by: FAMILY MEDICINE

## 2021-01-21 RX ORDER — ACYCLOVIR 800 MG/1
800 TABLET ORAL
Qty: 50 TABLET | Refills: 3 | Status: SHIPPED | OUTPATIENT
Start: 2021-01-21 | End: 2023-12-01

## 2021-01-21 RX ORDER — LEVOTHYROXINE SODIUM 125 UG/1
125 TABLET ORAL DAILY
Qty: 90 TABLET | Refills: 3 | Status: SHIPPED | OUTPATIENT
Start: 2021-01-21 | End: 2022-02-01

## 2021-01-21 RX ORDER — FLUTICASONE PROPIONATE 50 MCG
2 SPRAY, SUSPENSION (ML) NASAL DAILY
Qty: 18.2 ML | Refills: 11 | Status: SHIPPED | OUTPATIENT
Start: 2021-01-21 | End: 2023-10-27

## 2021-01-21 ASSESSMENT — PAIN SCALES - GENERAL: PAINLEVEL: NO PAIN (0)

## 2021-01-21 ASSESSMENT — ENCOUNTER SYMPTOMS
SHORTNESS OF BREATH: 0
COUGH: 0
FEVER: 0
CHILLS: 0

## 2021-01-21 ASSESSMENT — MIFFLIN-ST. JEOR: SCORE: 1579.98

## 2021-01-21 NOTE — PROGRESS NOTES
SUBJECTIVE:   Nursing Notes:   Kavitha Avila LPN  2021  8:27 AM  Sign at exiting of workspace  Patient presents to clinic for physical exam.  Medication Reconciliation: complete    Kavitha Avila LPN        Amanda Mckeon is a 30 year old female who presents to clinic today for a physical.    Has a bump on the back of her head.  It has been present for some time.  It has not changed significantly.  It does not cause her any pain.  She has had no other palpable lymph nodes in her neck.    Would like her moles checked.    Has a bump in the back of her mouth that comes and goes.  No bleeding or irritation.  It doesn't cause her any pain.        HPI    I personally reviewed medications/allergies/history listed below:    Patient Active Problem List    Diagnosis Date Noted     S/P  section 2019     Priority: Medium     Pregnancy 2019     Priority: Medium     Encounter for triage in pregnant patient 2019     Priority: Medium     Gestational diabetes mellitus (GDM) in third trimester, gestational diabetes method of control unspecified 2019     Priority: Medium     Pregnancy, unspecified gestational age 2018     Priority: Medium     Hypothyroidism, unspecified type 08/10/2018     Priority: Medium     Chronic seasonal allergic rhinitis, unspecified trigger 08/10/2018     Priority: Medium     Encounter for contraceptive management, unspecified type 08/10/2018     Priority: Medium     Ganglion cyst 2017     Priority: Medium     Recurrent cold sores 2016     Priority: Medium     Past Medical History:   Diagnosis Date     Cyst of right ovary     2016     Herpesviral vesicular dermatitis     2016     Hypothyroidism     2015      Past Surgical History:   Procedure Laterality Date     ADENOIDECTOMY      as a child     AS SHOULDER ARTHROSCOPY, DX Right     Dr. Bolivar - age 12      SECTION N/A 8/3/2019    Procedure:  SECTION;  Surgeon:  "Omar Flower MD;  Location: [facility] OR     MAMMOPLASTY REDUCTION      2007     PE TUBES      as a child     Family History   Problem Relation Age of Onset     Family History Negative Mother      Lupus Father      Family History Negative Brother      Kidney Disease Maternal Grandmother      Bladder Cancer Maternal Grandfather      Family History Negative Paternal Grandmother      Diabetes Paternal Grandfather      Social History     Tobacco Use     Smoking status: Never Smoker     Smokeless tobacco: Never Used   Substance Use Topics     Alcohol use: No     Social History     Social History Narrative    .  Works as an .         - Andrew - works for [employer] -  at [employer].     Current Outpatient Medications   Medication Sig Dispense Refill     acyclovir (ZOVIRAX) 800 MG tablet Take 1 tablet (800 mg) by mouth 5 times daily 50 tablet 3     fluticasone (FLONASE) 50 MCG/ACT nasal spray Spray 2 sprays into both nostrils daily 18.2 mL 11     folic acid (FOLVITE) 1 MG tablet Take 1 tablet (1 mg) by mouth daily 100 tablet 3     ibuprofen (ADVIL/MOTRIN) 600 MG tablet Take 1 tablet (600 mg) by mouth every 6 hours as needed for moderate pain 50 tablet 0     levothyroxine (SYNTHROID/LEVOTHROID) 125 MCG tablet Take 1 tablet (125 mcg) by mouth daily 90 tablet 3     norgestrel-ethinyl estradiol (LO/OVRAL) 0.3-30 MG-MCG tablet Take 1 tablet by mouth daily 84 tablet 3     Allergies   Allergen Reactions     Amoxicillin Hives     Childhood       Review of Systems   Constitutional: Negative for chills and fever.   Respiratory: Negative for cough and shortness of breath.    Cardiovascular: Negative for peripheral edema.   Psychiatric/Behavioral: Negative for mood changes.        OBJECTIVE:     /74 (BP Location: Right arm, Patient Position: Sitting, Cuff Size: Adult Large)   Pulse 81   Temp 97  F (36.1  C) (Tympanic)   Resp 18   Ht 1.638 m (5' 4.5\")   Wt 87.2 kg (192 lb 4 oz)   LMP " 01/10/2021 (Exact Date)   SpO2 98%   Breastfeeding No   BMI 32.49 kg/m    Body mass index is 32.49 kg/m .  Physical Exam  Constitutional:       General: She is not in acute distress.     Appearance: She is well-developed.   HENT:      Head: Normocephalic.      Comments: Small, pea-sized, rubbery feeling mobile mass on the back of her head noted.     Right Ear: Tympanic membrane and external ear normal.      Left Ear: Tympanic membrane and external ear normal.      Nose: Nose normal.      Mouth/Throat:      Mouth: Mucous membranes are moist.      Pharynx: No oropharyngeal exudate.      Comments: On the center of her palate, there is a 3 mm pink slightly raised papule.  Eyes:      General:         Right eye: No discharge.         Left eye: No discharge.      Conjunctiva/sclera: Conjunctivae normal.      Pupils: Pupils are equal, round, and reactive to light.   Neck:      Musculoskeletal: Neck supple.      Thyroid: No thyromegaly.      Trachea: No tracheal deviation.   Cardiovascular:      Rate and Rhythm: Normal rate and regular rhythm.      Pulses: Normal pulses.      Heart sounds: Normal heart sounds, S1 normal and S2 normal. No murmur. No friction rub. No gallop. No S3 or S4 sounds.    Pulmonary:      Effort: Pulmonary effort is normal. No respiratory distress.      Breath sounds: Normal breath sounds. No wheezing or rales.      Comments: Breast exam:  No masses palpable bilaterally.  No skin changes, tethering or axillary lymphadenopathy bilaterally.    Abdominal:      General: Bowel sounds are normal. There is no distension.      Palpations: Abdomen is soft. There is no mass.      Tenderness: There is no abdominal tenderness.   Genitourinary:     Comments: Pelvic Exam:  Vulva: No external lesions, normal hair distribution, no adenopathy  Vagina: Moist, pink, no abnormal discharge, well rugated, no lesions  Cervix: Pap smear obtained.  Cervix is parous, smooth, pink, no visible lesions  Uterus: Normal size,  anteverted, non-tender, mobile  Ovaries: No mass, non-tender, mobile  Musculoskeletal: Normal range of motion.   Lymphadenopathy:      Cervical: No cervical adenopathy.   Skin:     General: Skin is warm and dry.      Findings: No rash.      Comments: On her skin, there are numerous skin lesions of up to 7-8 mm in diameter that are brown.  Some have smooth borders, some more irregular.   Neurological:      Mental Status: She is alert and oriented to person, place, and time.      Motor: No abnormal muscle tone.      Deep Tendon Reflexes: Reflexes are normal and symmetric.   Psychiatric:         Thought Content: Thought content normal.         Judgment: Judgment normal.           PHQ-9 SCORE 8/7/2019 8/15/2019 9/16/2019   PHQ-9 Total Score 0 0 0       PHQ-2 Score:     PHQ-2 ( 1999 Pfizer) 1/21/2021 5/22/2020   Q1: Little interest or pleasure in doing things 0 0   Q2: Feeling down, depressed or hopeless 0 0   PHQ-2 Score 0 0       SANDY-7 SCORE 5/14/2019 8/7/2019 9/16/2019   Total Score 0 0 0             I personally reviewed results withpatient as listed below:   Diagnostic Test Results:  none     ASSESSMENT/PLAN:       ICD-10-CM    1. Health care maintenance  Z00.00    2. Localized swelling, mass, and lump of head  R22.0    3. Multiple atypical skin moles  D22.9 DERMATOLOGY ADULT REFERRAL   4. Lesion of palate  K13.79    5. Recurrent cold sores  B00.1 acyclovir (ZOVIRAX) 800 MG tablet   6. Seasonal allergic rhinitis, unspecified trigger  J30.2 fluticasone (FLONASE) 50 MCG/ACT nasal spray   7. Hypothyroidism, unspecified type  E03.9 levothyroxine (SYNTHROID/LEVOTHROID) 125 MCG tablet     TSH Reflex [facility]     TSH Reflex [facility]   8. Encounter for birth control pills maintenance  Z30.41 norgestrel-ethinyl estradiol (LO/OVRAL) 0.3-30 MG-MCG tablet   9. Screening for cervical cancer  Z12.4 HPV High Risk Types DNA Cervical     Pap Screen Thin Prep with HPV - recommended age 30 - 65 years (select HPV order below)   10. Screening for  diabetes mellitus  Z13.1 Comprehensive metabolic panel     Hemoglobin A1c     Hemoglobin A1c     Comprehensive metabolic panel   11. Screening for deficiency anemia  Z13.0 CBC with platelets differential     CBC with platelets differential   12. Screening for lipid disorders  Z13.220 Lipid Profile     Lipid Profile       1.  Pap Smear/HPV completed today.  Vaccines are up to date.  2.  These feels like it may be a small cyst.  She doesn't feel that she would want it to be removed at this time.  Follow up as needed.  There are no other masses palpable to suggest a more worrisome etiology.  3.  Since she has so many moles, some are large and multiple have irregular borders, she is referred to Dermatology to help determine which, if any, should be biopsied.  4.  This has the appearance of a fibroma.  Discussed if changing significantly, would recommend ENT consultation.  5.  Acyclovir refilled.  6.  flonase refilled.  7.  Levothyroxine refilled.  TSH as above.  8.  Oral contraceptives refilled.  She and her  are contemplating getting pregnant again in the next few months.  She has already been taking oral contraceptives.  9.  Pap Smear/HPV completed as above.  10.  Comprehensive Metabolic Profile as above as well as A1c with her past history of gestational diabetes.  11.  Complete Blood Count as above.  12.  Fasting lipid profile today as above.    Liza Ordoñez MD  [facility]    Portions of this dictation were created using the Dragon Nuance voice recognition system. Proofreading was completed but there may be errors in text.

## 2021-01-21 NOTE — NURSING NOTE
Patient presents to clinic for physical exam.  Medication Reconciliation: complete    Kavitha Avila, SARAHYN

## 2021-01-27 LAB
COPATH REPORT: NORMAL
PAP: NORMAL

## 2021-01-28 LAB
FINAL DIAGNOSIS: NORMAL
HPV HR 12 DNA CVX QL NAA+PROBE: NEGATIVE
HPV16 DNA SPEC QL NAA+PROBE: NEGATIVE
HPV18 DNA SPEC QL NAA+PROBE: NEGATIVE
SPECIMEN DESCRIPTION: NORMAL
SPECIMEN SOURCE CVX/VAG CYTO: NORMAL

## 2021-01-29 ENCOUNTER — MYC MEDICAL ADVICE (OUTPATIENT)
Dept: FAMILY MEDICINE | Facility: OTHER | Age: 31
End: 2021-01-29

## 2021-02-01 ENCOUNTER — MYC MEDICAL ADVICE (OUTPATIENT)
Dept: FAMILY MEDICINE | Facility: OTHER | Age: 31
End: 2021-02-01

## 2021-02-01 NOTE — TELEPHONE ENCOUNTER
Routing to [address] Schedulers to address referral to Dermatologist.  Kavitha Avila LPN............2/1/2021 10:05 AM

## 2021-02-04 ENCOUNTER — TRANSFERRED RECORDS (OUTPATIENT)
Dept: HEALTH INFORMATION MANAGEMENT | Facility: OTHER | Age: 31
End: 2021-02-04

## 2021-02-08 ENCOUNTER — MYC MEDICAL ADVICE (OUTPATIENT)
Dept: FAMILY MEDICINE | Facility: OTHER | Age: 31
End: 2021-02-08

## 2021-02-08 ENCOUNTER — OFFICE VISIT (OUTPATIENT)
Dept: FAMILY MEDICINE | Facility: OTHER | Age: 31
End: 2021-02-08
Attending: FAMILY MEDICINE
Payer: COMMERCIAL

## 2021-02-08 VITALS
BODY MASS INDEX: 33.05 KG/M2 | DIASTOLIC BLOOD PRESSURE: 74 MMHG | TEMPERATURE: 97.7 F | WEIGHT: 198.38 LBS | HEART RATE: 82 BPM | HEIGHT: 65 IN | OXYGEN SATURATION: 98 % | RESPIRATION RATE: 20 BRPM | SYSTOLIC BLOOD PRESSURE: 130 MMHG

## 2021-02-08 DIAGNOSIS — Z34.81 PRENATAL CARE, SUBSEQUENT PREGNANCY IN FIRST TRIMESTER: Primary | ICD-10-CM

## 2021-02-08 LAB — HCG UR QL: POSITIVE

## 2021-02-08 PROCEDURE — 81025 URINE PREGNANCY TEST: CPT | Mod: ZL | Performed by: FAMILY MEDICINE

## 2021-02-08 PROCEDURE — 99213 OFFICE O/P EST LOW 20 MIN: CPT | Performed by: FAMILY MEDICINE

## 2021-02-08 RX ORDER — PRENATAL VIT/IRON FUM/FOLIC AC 27MG-0.8MG
1 TABLET ORAL DAILY
Qty: 90 TABLET | Refills: 3 | COMMUNITY
Start: 2021-02-08 | End: 2021-07-26

## 2021-02-08 ASSESSMENT — ENCOUNTER SYMPTOMS
NERVOUS/ANXIOUS: 0
FEVER: 0
CHILLS: 0
SHORTNESS OF BREATH: 0
COUGH: 0

## 2021-02-08 ASSESSMENT — PAIN SCALES - GENERAL: PAINLEVEL: NO PAIN (0)

## 2021-02-08 ASSESSMENT — MIFFLIN-ST. JEOR: SCORE: 1607.76

## 2021-02-08 NOTE — NURSING NOTE
Patient presents to clinic after 3 positive pregnancy home tests.  First day of last menstrual cycle was 01/10/21.  Medication Reconciliation: complete    Kavitha Avila LPN

## 2021-02-08 NOTE — PROGRESS NOTES
SUBJECTIVE:   Nursing Notes:   Kavitha Avila LPN  2021  3:37 PM  Sign at exiting of workspace  Patient presents to clinic after 3 positive pregnancy home tests.  First day of last menstrual cycle was 01/10/21.  Medication Reconciliation: complete    Kavitha Avila LPN        Amanda Mckeon is a 30 year old female who presents to clinic today for pregnancy confirmation.  Her last menstrual period was 1/10/2021.  She has had 3 home pregnancy tests, which were positive 2 days ago.  Feels more gaggy than she normally would be.  Has had more discharge than usual as well.  Has already been taking a prenatal vitamin prior to conception.    HPI    I personally reviewed medications/allergies/history listed below:    Patient Active Problem List    Diagnosis Date Noted     S/P  section 2019     Priority: Medium     Pregnancy 2019     Priority: Medium     Encounter for triage in pregnant patient 2019     Priority: Medium     Gestational diabetes mellitus (GDM) in third trimester, gestational diabetes method of control unspecified 2019     Priority: Medium     Pregnancy, unspecified gestational age 2018     Priority: Medium     Hypothyroidism, unspecified type 08/10/2018     Priority: Medium     Chronic seasonal allergic rhinitis, unspecified trigger 08/10/2018     Priority: Medium     Encounter for contraceptive management, unspecified type 08/10/2018     Priority: Medium     Ganglion cyst 2017     Priority: Medium     Recurrent cold sores 2016     Priority: Medium     Past Medical History:   Diagnosis Date     Cyst of right ovary     2016     Herpesviral vesicular dermatitis     2016     Hypothyroidism     2015      Past Surgical History:   Procedure Laterality Date     ADENOIDECTOMY      as a child     AS SHOULDER ARTHROSCOPY, DX Right     Dr. Bolivar - age 12      SECTION N/A 8/3/2019    Procedure:  SECTION;  Surgeon: Omar Flower  MD LEVY;  Location: [facility] OR     MAMMOPLASTY REDUCTION      2007     PE TUBES      as a child     Family History   Problem Relation Age of Onset     Family History Negative Mother      Lupus Father      Family History Negative Brother      Kidney Disease Maternal Grandmother      Bladder Cancer Maternal Grandfather      Family History Negative Paternal Grandmother      Diabetes Paternal Grandfather      Social History     Tobacco Use     Smoking status: Never Smoker     Smokeless tobacco: Never Used   Substance Use Topics     Alcohol use: No     Social History     Social History Narrative    .  Works as an .         - Andrew - works for [employer] -  at [employer].     Current Outpatient Medications   Medication Sig Dispense Refill     Prenatal Vit-Fe Fumarate-FA (PRENATAL MULTIVITAMIN W/IRON) 27-0.8 MG tablet Take 1 tablet by mouth daily 90 tablet 3     acyclovir (ZOVIRAX) 800 MG tablet Take 1 tablet (800 mg) by mouth 5 times daily 50 tablet 3     fluticasone (FLONASE) 50 MCG/ACT nasal spray Spray 2 sprays into both nostrils daily 18.2 mL 11     folic acid (FOLVITE) 1 MG tablet Take 1 tablet (1 mg) by mouth daily 100 tablet 3     ibuprofen (ADVIL/MOTRIN) 600 MG tablet Take 1 tablet (600 mg) by mouth every 6 hours as needed for moderate pain 50 tablet 0     levothyroxine (SYNTHROID/LEVOTHROID) 125 MCG tablet Take 1 tablet (125 mcg) by mouth daily 90 tablet 3     Allergies   Allergen Reactions     Amoxicillin Hives     Childhood       Review of Systems   Constitutional: Negative for chills and fever.   Respiratory: Negative for cough and shortness of breath.    Cardiovascular: Negative for peripheral edema.   Psychiatric/Behavioral: Negative for mood changes. The patient is not nervous/anxious.         OBJECTIVE:     /74 (BP Location: Right arm, Patient Position: Sitting, Cuff Size: Adult Large)   Pulse 82   Temp 97.7  F (36.5  C) (Tympanic)   Resp 20   Ht 1.638 m (5'  "4.5\")   Wt 90 kg (198 lb 6 oz)   LMP 01/10/2021 (Exact Date)   SpO2 98%   Breastfeeding No   BMI 33.53 kg/m    Body mass index is 33.53 kg/m .  Physical Exam  Constitutional:       Appearance: Normal appearance.   HENT:      Head: Normocephalic.   Eyes:      Extraocular Movements: Extraocular movements intact.      Pupils: Pupils are equal, round, and reactive to light.   Neck:      Musculoskeletal: Normal range of motion and neck supple.   Cardiovascular:      Rate and Rhythm: Normal rate and regular rhythm.      Pulses: Normal pulses.      Heart sounds: No murmur.   Pulmonary:      Effort: Pulmonary effort is normal.      Breath sounds: Normal breath sounds. No wheezing.   Musculoskeletal:      Right lower leg: No edema.      Left lower leg: No edema.   Neurological:      Mental Status: She is alert.   Psychiatric:         Mood and Affect: Mood normal.         Behavior: Behavior normal.           PHQ-9 SCORE 8/7/2019 8/15/2019 9/16/2019   PHQ-9 Total Score 0 0 0       PHQ-2 Score:     PHQ-2 ( 1999 Pfizer) 2/8/2021 1/21/2021   Q1: Little interest or pleasure in doing things 0 0   Q2: Feeling down, depressed or hopeless 0 0   PHQ-2 Score 0 0       SANDY-7 SCORE 5/14/2019 8/7/2019 9/16/2019   Total Score 0 0 0           I personally reviewed results withpatient as listed below:   Diagnostic Test Results:  Results for orders placed or performed in visit on 02/08/21 (from the past 24 hour(s))   Pregnancy, Urine (HCG)   Result Value Ref Range    HCG Qual Urine Positive (A) NEG^Negative       ASSESSMENT/PLAN:       ICD-10-CM    1. Prenatal care, subsequent pregnancy in first trimester  Z34.81 Pregnancy, Urine (HCG)     US OB < 14 Weeks Single     Prenatal Vit-Fe Fumarate-FA (PRENATAL MULTIVITAMIN W/IRON) 27-0.8 MG tablet       1.  Pregnancy confirmation as above.  Ultrasound scheduled to confirm dates in approximately 2 weeks.  Referred for OB intake visit and also helped to schedule OB physical.    Liza MOTA" MD Smita  [facility]    Portions of this dictation were created using the Dragon Nuance voice recognition system. Proofreading was completed but there may be errors in text.

## 2021-02-09 ENCOUNTER — MYC MEDICAL ADVICE (OUTPATIENT)
Dept: FAMILY MEDICINE | Facility: OTHER | Age: 31
End: 2021-02-09

## 2021-02-09 DIAGNOSIS — D03.72 MELANOMA IN SITU OF LEFT LOWER LEG (H): ICD-10-CM

## 2021-02-11 ENCOUNTER — MYC MEDICAL ADVICE (OUTPATIENT)
Dept: FAMILY MEDICINE | Facility: OTHER | Age: 31
End: 2021-02-11

## 2021-02-11 ENCOUNTER — TELEPHONE (OUTPATIENT)
Dept: FAMILY MEDICINE | Facility: OTHER | Age: 31
End: 2021-02-11

## 2021-02-11 DIAGNOSIS — D03.72 MELANOMA IN SITU OF LEFT LOWER EXTREMITY INCLUDING HIP (H): Primary | ICD-10-CM

## 2021-02-11 NOTE — TELEPHONE ENCOUNTER
"Pt is very distressed and wants to talk to the doctor directly rather than a nurse. When trying to get more information regarding Pt concerns she stated she has a \"long list\" and started with she wants to know its safe for the baby. She also stated she was referred to an NP and is worried it would be better to see an MD in [place], which would require an urgent referral.      Please call patient back at: [phone]    Maame Mattson LPN on 2/11/2021 at 4:01 PM    "

## 2021-02-11 NOTE — TELEPHONE ENCOUNTER
I called and spoke to patient, answering her questions.  She would like to see Dr. Lanier at [facility] for ongoing Dermatology care for her history of melanoma in situ.  I tried calling the Dermatology clinic to see about getting her an appointment, but they had just closed for the day.  Will try to call tomorrow - [phone].  Liza Ordoñez MD

## 2021-02-12 ENCOUNTER — TELEPHONE (OUTPATIENT)
Dept: FAMILY MEDICINE | Facility: OTHER | Age: 31
End: 2021-02-12

## 2021-02-12 ENCOUNTER — MYC MEDICAL ADVICE (OUTPATIENT)
Dept: FAMILY MEDICINE | Facility: OTHER | Age: 31
End: 2021-02-12

## 2021-02-12 NOTE — TELEPHONE ENCOUNTER
States she is needing to discuss a recent mole removal. She doesn't want to talk to any nurse, only to [facility]. Wants a call back today

## 2021-02-12 NOTE — TELEPHONE ENCOUNTER
Please see telephone note from today regarding same issue.    Kavitha Avila LPN............2/12/2021 4:33 PM

## 2021-02-12 NOTE — TELEPHONE ENCOUNTER
I called Dr. Lanier's office today, but she is out of clinic today.  Will try to get a hold of her next week.  Liza Ordoñez MD

## 2021-02-15 ENCOUNTER — VIRTUAL VISIT (OUTPATIENT)
Dept: OBGYN | Facility: OTHER | Age: 31
End: 2021-02-15
Attending: FAMILY MEDICINE
Payer: COMMERCIAL

## 2021-02-15 DIAGNOSIS — Z32.01 PREGNANCY TEST POSITIVE: Primary | ICD-10-CM

## 2021-02-15 PROCEDURE — 99207 PR OB VISIT-NO CHARGE - GICH ONLY: CPT | Mod: TEL

## 2021-02-15 ASSESSMENT — PATIENT HEALTH QUESTIONNAIRE - PHQ9: SUM OF ALL RESPONSES TO PHQ QUESTIONS 1-9: 0

## 2021-02-15 NOTE — PROGRESS NOTES
HPI:    This is a 30 year old female patient,  who was called today for OB Intake visit. Patient reports positive pregnancy test at home.     Obstetrical history and OB Demographics updated to the best of this nurse's ability based on patient report. PHQ-9 depression screening and routine Domestic Abuse screening completed. All immediate questions and concerns answered.    Last menstrual period is reported as Patient's last menstrual period was 01/10/2021 (exact date). BONI based on LMP is 10/17/2021.  Her cycles are regular.  Her last menstrual period was normal.   Since her LMP, she has experienced  none.       Personal OB history includes: G  2 and P  1  Previous OB Provider: MIKA Flower for C section   Previous Delivering Clinic: [facility]  Release of Records: NA    Current delivery plan: C section  Preferred OB Provider: MIKA Flower for    Current Primary Care Provider: [facility]   Pediatrician: [facility]    Additional History: melanoma mole hx     Have you travelled during the pregnancy?No  Have your sexual partner(s) travelled during the pregnancy?No      HISTORY:   Planned Pregnancy: Yes  Marital Status:   Occupation: Assistant    Living in Household: Spouse and Children    Father of the baby is involved.   Family and father of baby is supportive of current pregnancy.  Past Medical History of Father of Baby:No significant medical history    Past History:  Her past medical history   Past Medical History:   Diagnosis Date     Cyst of right ovary     2016     Herpesviral vesicular dermatitis     2016     Hypothyroidism     2015   .      She has a history of  gestational diabetes, diet controlled    Since her last LMP she denies use of alcohol, tobacco and street drugs.    Pap smear history:     STD/STI history: No STD history    STD/STI symptoms: no noticeable symptoms     Past medical, surgical, social and family history were reviewed and updated in EPIC.    Medications reviewed  by this nurse. Current medication list:  Current Outpatient Medications   Medication Sig Dispense Refill     acyclovir (ZOVIRAX) 800 MG tablet Take 1 tablet (800 mg) by mouth 5 times daily 50 tablet 3     fluticasone (FLONASE) 50 MCG/ACT nasal spray Spray 2 sprays into both nostrils daily 18.2 mL 11     folic acid (FOLVITE) 1 MG tablet Take 1 tablet (1 mg) by mouth daily 100 tablet 3     levothyroxine (SYNTHROID/LEVOTHROID) 125 MCG tablet Take 1 tablet (125 mcg) by mouth daily 90 tablet 3     Prenatal Vit-Fe Fumarate-FA (PRENATAL MULTIVITAMIN W/IRON) 27-0.8 MG tablet Take 1 tablet by mouth daily 90 tablet 3     The following medications were recommended to be discontinued due to Pregnancy Category D status: Will follow up with Provider for any concerns   Patient informed to contact her primary care provider as soon as possible to discuss a safer alternative.    Risk factors:  Moderate and moderately severe risks (consult with OB/Gyn)  Previous fetal or  demise: No  History of  delivery: No  History of heart disease Class I: No  Severe anemia, unresponsive to iron therapy: No  Pelvic mass or neoplasm: No  Previous : Yes  Hyper/hypothyroidism: Yes  History of postpartum hemorrhage requiring transfusion:No  History of Placenta Accreta: No    High Risk (Pregnancy managed by OB/Gyn)  Multiple pregnancy: No  Pre-gestational diabetes: No  Chronic Hypertension: No  Renal Failure: No  Heart disease, class II or greater: No  Rh Isoimmunization: No  Chronic active hepatitis: No  Convulsive disorder, poorly controlled: No  Isoimmune thrombocytopenia: No  Pre-term premature rupture of membranes: No  Lupus or other autoimmune disorder: No  Human Immunodeficiency Virus: No      ASSESSMENT/PLAN:     No diagnosis found.    30 year old , 5w1d of pregnancy with BONI of 10/17/2021, by Last Menstrual Period    Per standing orders and scope of practice of this nurse, patient will have the following orders  placed and completed prior to initial OB visit with the appropriate provider:    --early ultrasound for dating and viability ordered for 6+ weeks gestation based on LMP    --Quantitative Beta HCG and progesterone monitoring if indicated    Counseling given:     - Recommended weight gain for pregnancy: < 15 lbs.   BMI < 18.5  28-40 lbs   18.5 - 24.9 25-35   25 - 29.9 15-25   > 30  < 15       PLAN/PATIENT INSTRUCTIONS:    Normal exercise.  Normal sexual activity.  Prenatal vitamins.  Anticipated weight gain.    follow-up appointment with Dr. SUAREZ for pre- care and take multivitamin or pre-destinee vitamins    Erika Ureña RN.................................................. 2/15/2021 10:33 AM

## 2021-02-15 NOTE — TELEPHONE ENCOUNTER
I called Dr. Lanier's office again today.  She is again out today and is expected to be back in the office tomorrow.  I will plan to call again then.  MD Dr. Yannick Mcclellan called me back this afternoon.  She will have her office contact patient to arrange an appointment for her.  Liza Ordoñez MD

## 2021-02-19 ENCOUNTER — TRANSFERRED RECORDS (OUTPATIENT)
Dept: HEALTH INFORMATION MANAGEMENT | Facility: OTHER | Age: 31
End: 2021-02-19

## 2021-02-26 ENCOUNTER — OFFICE VISIT (OUTPATIENT)
Dept: FAMILY MEDICINE | Facility: OTHER | Age: 31
End: 2021-02-26
Attending: FAMILY MEDICINE
Payer: COMMERCIAL

## 2021-02-26 DIAGNOSIS — Z48.02 VISIT FOR SUTURE REMOVAL: Primary | ICD-10-CM

## 2021-02-26 DIAGNOSIS — Z33.1 PREGNANCY, INCIDENTAL: ICD-10-CM

## 2021-02-26 DIAGNOSIS — D03.72 MELANOMA IN SITU OF LEFT LOWER EXTREMITY INCLUDING HIP (H): ICD-10-CM

## 2021-02-26 PROCEDURE — 99213 OFFICE O/P EST LOW 20 MIN: CPT | Performed by: FAMILY MEDICINE

## 2021-02-26 NOTE — PROGRESS NOTES
SUBJECTIVE:   CC:  Amanda Mckeon is a 30 year old female who presents to clinic today for the following health issues:  Suture removal     HPI  Amanda Mckeon is a 30 year old female who presents for suture removal.    She had melanoma in situ removed from her left hip, date of procedure 2 weeks ago.  Since then, she has received pathology results.  She has also had a follow-up with dermatology and hand another mole removed from her right lateral chest wall.  She has some tenderness at the biopsy site.  Patient is 6-1/2 weeks pregnant, she has some questions/concerns about monitoring during pregnancy.       Allergies   Allergen Reactions     Amoxicillin Hives     Childhood     Current Outpatient Medications   Medication     acyclovir (ZOVIRAX) 800 MG tablet     fluticasone (FLONASE) 50 MCG/ACT nasal spray     folic acid (FOLVITE) 1 MG tablet     levothyroxine (SYNTHROID/LEVOTHROID) 125 MCG tablet     Prenatal Vit-Fe Fumarate-FA (PRENATAL MULTIVITAMIN W/IRON) 27-0.8 MG tablet     No current facility-administered medications for this visit.           Review of Systems     PHQ-2 Score:     PHQ-2 ( 1999 Pf[employer]) 2/8/2021 1/21/2021   Q1: Little interest or pleasure in doing things 0 0   Q2: Feeling down, depressed or hopeless 0 0   PHQ-2 Score 0 0         PHQ-9 SCORE 8/15/2019 9/16/2019 2/15/2021   PHQ-9 Total Score 0 0 0     SANDY-7 SCORE 5/14/2019 8/7/2019 9/16/2019   Total Score 0 0 0             OBJECTIVE:     LMP 01/10/2021 (Exact Date)   Wt Readings from Last 3 Encounters:   02/08/21 90 kg (198 lb 6 oz)   01/21/21 87.2 kg (192 lb 4 oz)   05/22/20 (P) 86.5 kg (190 lb 12.8 oz)       There is no height or weight on file to calculate BMI.  Physical Exam  Vitals signs and nursing note reviewed.   Constitutional:       Appearance: Normal appearance.   Skin:     Comments: Left lateral thigh with 4cm in length healing incision.  In the area where the tape was, she has some erythema, mild tenderness.  No drainage from  the incision.  Sutures were removed and Steri-Strips placed.    Right lateral chest wall: 2 cm in length healing incision   Neurological:      Mental Status: She is alert.          No results found for any visits on 02/26/21.      ASSESSMENT/PLAN:     1. Visit for suture removal    2. 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 Pregnancy, incidental        Assessment & Plan   Problem List Items Addressed This Visit     None      Visit Diagnoses     Visit for suture removal    -  Primary    Melanoma in situ of left lower extremity including hip (H)        Pregnancy, incidental            1.  Follow up skin care discussed.  I think her other sutures can be removed on the 3rd.  2.  Discussed pregnancy related effects on moles, new moles and changes.  Low threshold to biopsy.  She does have every 3 month follow-up skin exams scheduled with dermatology as well as she knows she can ask her primary care provider to perform skin check at her OB follow-up visits.  With current findings having been melanoma in situ, this does not pose a risk to her pregnancy.    {Provider  Link to MDM Help Grid :15      ENEIDA GONZALEZ MD  [facility]    This note was created using voice recognition software and was screened for errors in transcription.

## 2021-03-05 ENCOUNTER — HOSPITAL ENCOUNTER (OUTPATIENT)
Dept: ULTRASOUND IMAGING | Facility: OTHER | Age: 31
End: 2021-03-05
Attending: FAMILY MEDICINE
Payer: COMMERCIAL

## 2021-03-05 ENCOUNTER — PRENATAL OFFICE VISIT (OUTPATIENT)
Dept: FAMILY MEDICINE | Facility: OTHER | Age: 31
End: 2021-03-05
Attending: FAMILY MEDICINE
Payer: COMMERCIAL

## 2021-03-05 VITALS
OXYGEN SATURATION: 98 % | TEMPERATURE: 98.2 F | BODY MASS INDEX: 33.55 KG/M2 | SYSTOLIC BLOOD PRESSURE: 120 MMHG | DIASTOLIC BLOOD PRESSURE: 68 MMHG | RESPIRATION RATE: 20 BRPM | WEIGHT: 201.38 LBS | HEART RATE: 96 BPM | HEIGHT: 65 IN

## 2021-03-05 DIAGNOSIS — Z34.81 PRENATAL CARE, SUBSEQUENT PREGNANCY IN FIRST TRIMESTER: ICD-10-CM

## 2021-03-05 DIAGNOSIS — E03.9 HYPOTHYROIDISM, UNSPECIFIED TYPE: ICD-10-CM

## 2021-03-05 DIAGNOSIS — Z3A.01 LESS THAN 8 WEEKS GESTATION OF PREGNANCY: Primary | ICD-10-CM

## 2021-03-05 LAB
ABO + RH BLD: NORMAL
ABO + RH BLD: NORMAL
ALBUMIN UR-MCNC: NEGATIVE MG/DL
APPEARANCE UR: ABNORMAL
BACTERIA #/AREA URNS HPF: ABNORMAL /HPF
BASOPHILS # BLD AUTO: 0.1 10E9/L (ref 0–0.2)
BASOPHILS NFR BLD AUTO: 0.4 %
BILIRUB UR QL STRIP: NEGATIVE
BLD GP AB SCN SERPL QL: NORMAL
COLOR UR AUTO: ABNORMAL
DIFFERENTIAL METHOD BLD: ABNORMAL
EOSINOPHIL # BLD AUTO: 0.1 10E9/L (ref 0–0.7)
EOSINOPHIL NFR BLD AUTO: 0.8 %
ERYTHROCYTE [DISTWIDTH] IN BLOOD BY AUTOMATED COUNT: 11.9 % (ref 10–15)
GLUCOSE UR STRIP-MCNC: NEGATIVE MG/DL
HCT VFR BLD AUTO: 38.4 % (ref 35–47)
HGB BLD-MCNC: 13.3 G/DL (ref 11.7–15.7)
HGB UR QL STRIP: NEGATIVE
IMM GRANULOCYTES # BLD: 0.2 10E9/L (ref 0–0.4)
IMM GRANULOCYTES NFR BLD: 1.3 %
KETONES UR STRIP-MCNC: NEGATIVE MG/DL
LEUKOCYTE ESTERASE UR QL STRIP: ABNORMAL
LYMPHOCYTES # BLD AUTO: 2.3 10E9/L (ref 0.8–5.3)
LYMPHOCYTES NFR BLD AUTO: 19.7 %
MCH RBC QN AUTO: 29.9 PG (ref 26.5–33)
MCHC RBC AUTO-ENTMCNC: 34.6 G/DL (ref 31.5–36.5)
MCV RBC AUTO: 86 FL (ref 78–100)
MONOCYTES # BLD AUTO: 0.7 10E9/L (ref 0–1.3)
MONOCYTES NFR BLD AUTO: 6 %
MUCOUS THREADS #/AREA URNS LPF: PRESENT /LPF
NEUTROPHILS # BLD AUTO: 8.6 10E9/L (ref 1.6–8.3)
NEUTROPHILS NFR BLD AUTO: 71.8 %
NITRATE UR QL: NEGATIVE
PH UR STRIP: 5.5 PH (ref 5–7)
PLATELET # BLD AUTO: 366 10E9/L (ref 150–450)
RBC # BLD AUTO: 4.45 10E12/L (ref 3.8–5.2)
RBC #/AREA URNS AUTO: 2 /HPF (ref 0–2)
SOURCE: ABNORMAL
SP GR UR STRIP: 1 (ref 1–1.03)
SPECIMEN EXP DATE BLD: NORMAL
SQUAMOUS #/AREA URNS AUTO: 4 /HPF (ref 0–1)
TSH SERPL DL<=0.05 MIU/L-ACNC: 3.59 IU/ML (ref 0.34–5.6)
UROBILINOGEN UR STRIP-MCNC: NORMAL MG/DL (ref 0–2)
WBC # BLD AUTO: 11.9 10E9/L (ref 4–11)
WBC #/AREA URNS AUTO: 7 /HPF (ref 0–5)

## 2021-03-05 PROCEDURE — 99207 PR OB VISIT-NO CHARGE - GICH ONLY: CPT | Performed by: FAMILY MEDICINE

## 2021-03-05 PROCEDURE — 84443 ASSAY THYROID STIM HORMONE: CPT | Mod: ZL | Performed by: FAMILY MEDICINE

## 2021-03-05 PROCEDURE — 81001 URINALYSIS AUTO W/SCOPE: CPT | Mod: ZL | Performed by: FAMILY MEDICINE

## 2021-03-05 PROCEDURE — 76801 OB US < 14 WKS SINGLE FETUS: CPT

## 2021-03-05 PROCEDURE — 86850 RBC ANTIBODY SCREEN: CPT | Mod: ZL | Performed by: FAMILY MEDICINE

## 2021-03-05 PROCEDURE — 87340 HEPATITIS B SURFACE AG IA: CPT | Mod: ZL | Performed by: FAMILY MEDICINE

## 2021-03-05 PROCEDURE — 36415 COLL VENOUS BLD VENIPUNCTURE: CPT | Mod: ZL | Performed by: FAMILY MEDICINE

## 2021-03-05 PROCEDURE — 86900 BLOOD TYPING SEROLOGIC ABO: CPT | Mod: ZL | Performed by: FAMILY MEDICINE

## 2021-03-05 PROCEDURE — 86780 TREPONEMA PALLIDUM: CPT | Mod: ZL | Performed by: FAMILY MEDICINE

## 2021-03-05 PROCEDURE — 86762 RUBELLA ANTIBODY: CPT | Mod: ZL | Performed by: FAMILY MEDICINE

## 2021-03-05 PROCEDURE — 87389 HIV-1 AG W/HIV-1&-2 AB AG IA: CPT | Mod: ZL | Performed by: FAMILY MEDICINE

## 2021-03-05 PROCEDURE — 85025 COMPLETE CBC W/AUTO DIFF WBC: CPT | Mod: ZL | Performed by: FAMILY MEDICINE

## 2021-03-05 PROCEDURE — 86901 BLOOD TYPING SEROLOGIC RH(D): CPT | Mod: ZL | Performed by: FAMILY MEDICINE

## 2021-03-05 ASSESSMENT — PAIN SCALES - GENERAL: PAINLEVEL: NO PAIN (0)

## 2021-03-05 ASSESSMENT — MIFFLIN-ST. JEOR: SCORE: 1621.37

## 2021-03-05 NOTE — PATIENT INSTRUCTIONS
Call the [employer]ist phone number to help determine coverage/cost for the [employer] test.  Phone# [phone] (Option 1).  Line is open Monday-Friday 9am-7pm Eastern Time.

## 2021-03-05 NOTE — PROGRESS NOTES
New Obstetrics Visit    HPI: 30 year old  at 7w5d by last menstrual period here today for initial OB visit. Her LMP was 1/10/2021. No vomiting, but feels nauseous unless she is eating. Smells bother her.      OBHx  OB History    Para Term  AB Living   2 1 1 0 0 1   SAB TAB Ectopic Multiple Live Births   0 0 0 0 1      # Outcome Date GA Lbr Rob/2nd Weight Sex Delivery Anes PTL Lv   2 Current            1 Term 19 39w3d  3.033 kg (6 lb 11 oz) M CS-LTranv EPI N RADHA      Complications: Fetal Intolerance      Name: TIMAMALE-PHUONG      Apgar1: 8  Apgar5: 9       GYN History  - Menses: Patient's last menstrual period was 01/10/2021 (exact date)..   - Pap Smears: 2021 nil      Lab Results   Component Value Date    PAP NIL 2021     - Sexual Activity/Concerns: no  - Hx STIs/UTIs: no    PMHx:   Past Medical History:   Diagnosis Date     Cyst of right ovary     2016     Herpesviral vesicular dermatitis     2016     Hypothyroidism     2015      PSHx:   Past Surgical History:   Procedure Laterality Date     ADENOIDECTOMY      as a child     AS SHOULDER ARTHROSCOPY, DX Right     Dr. Bolivar - age 12      SECTION N/A 8/3/2019    Procedure:  SECTION;  Surgeon: Omar Flower MD;  Location: [facility] OR     MAMMOPLASTY REDUCTION           PE TUBES      as a child      Meds:   Current Outpatient Medications   Medication     acyclovir (ZOVIRAX) 800 MG tablet     fluticasone (FLONASE) 50 MCG/ACT nasal spray     folic acid (FOLVITE) 1 MG tablet     levothyroxine (SYNTHROID/LEVOTHROID) 125 MCG tablet     Prenatal Vit-Fe Fumarate-FA (PRENATAL MULTIVITAMIN W/IRON) 27-0.8 MG tablet     No current facility-administered medications for this visit.      Allergies:     Allergies   Allergen Reactions     Amoxicillin Hives     Childhood       SocHx:   Social History     Tobacco Use     Smoking status: Never Smoker     Smokeless tobacco: Never Used   Substance Use Topics      "Alcohol use: No     Drug use: No     Comment: Drug use: No       FamHx:   Family History   Problem Relation Age of Onset     Family History Negative Mother      Lupus Father      Family History Negative Brother      Kidney Disease Maternal Grandmother      Bladder Cancer Maternal Grandfather      Family History Negative Paternal Grandmother      Diabetes Paternal Grandfather         Review of Systems      Physical Exam  /68 (BP Location: Right arm, Patient Position: Sitting, Cuff Size: Adult Large)   Pulse 96   Temp 98.2  F (36.8  C) (Tympanic)   Resp 20   Ht 1.638 m (5' 4.5\")   Wt 91.3 kg (201 lb 6 oz)   LMP 01/10/2021 (Exact Date)   SpO2 98%   Breastfeeding No   BMI 34.03 kg/m    Body mass index is 34.03 kg/m .  Gen: Well-appearing, no apparent distress.  HEENT: Normocephalic, atraumatic.  Pupils equally round and reactive to light and accomodation.  Oropharynx clear.  TMs clear bilaterally.  Neck: Thyroid is not enlarged, no appreciable masses palpated. Non-tender.  Breast: Symmetric, no nipple discharge or retraction, no axillary lymphadenopathy, no palpable nodules or masses bilaterally.  CV:  Regular rate and rhythm.  No murmur, gallop or rub.  Normal S1, S2.  Lungs: Clear to auscultation bilaterally.   Abd: Positive bowel sounds, soft, non-tender, non-distended.  Pelvic:  Deferred.  Ext: No lower extremity edema, extremities warm and well perfused.      Labs:  Results for orders placed or performed during the hospital encounter of 03/05/21    OB < 14 Weeks Single     Status: None    Narrative    PROCEDURE: US OB < 14 WEEKS SINGLE 3/5/2021 2:34 PM    HISTORY: schedule in about 2 weeks, confirm viability and dates.;  Prenatal care, subsequent pregnancy in first trimester    COMPARISONS: None.    TECHNIQUE: Routine early OB ultrasound.    FINDINGS: There is a gestational sac within the uterus. This contains  a yolk sac and an embryo with cardiac activity and a heart rate of 172  bpm. Based on a " crown-rump length of 16.8 mm estimated gestational age  is 8 weeks 1 day. EDC by ultrasound is 10/14/2021.    There is a small subchorionic hemorrhage which measures less than 1 cm  in size. There is a 10 mm probable fibroid.         Impression    IMPRESSION: Early intrauterine gestation with estimated age of 8  weeks.    MARISOL MIDDLETON MD   Results for orders placed or performed in visit on 03/05/21   UA reflex to Microscopic     Status: Abnormal   Result Value Ref Range    Color Urine Light Yellow     Appearance Urine Slightly Cloudy     Glucose Urine Negative NEG^Negative mg/dL    Bilirubin Urine Negative NEG^Negative    Ketones Urine Negative NEG^Negative mg/dL    Specific Gravity Urine 1.004 1.003 - 1.035    Blood Urine Negative NEG^Negative    pH Urine 5.5 5.0 - 7.0 pH    Protein Albumin Urine Negative NEG^Negative mg/dL    Urobilinogen mg/dL Normal 0.0 - 2.0 mg/dL    Nitrite Urine Negative NEG^Negative    Leukocyte Esterase Urine Trace (A) NEG^Negative    Source Midstream Urine     RBC Urine 2 0 - 2 /HPF    WBC Urine 7 (H) 0 - 5 /HPF    Bacteria Urine Few (A) NEG^Negative /HPF    Squamous Epithelial /HPF Urine 4 (H) 0 - 1 /HPF    Mucous Urine Present (A) NEG^Negative /LPF   TSH Reflex [facility]     Status: None   Result Value Ref Range    TSH Reflex 3.59 0.34 - 5.60 IU/mL   CBC with platelets differential     Status: Abnormal   Result Value Ref Range    WBC 11.9 (H) 4.0 - 11.0 10e9/L    RBC Count 4.45 3.8 - 5.2 10e12/L    Hemoglobin 13.3 11.7 - 15.7 g/dL    Hematocrit 38.4 35.0 - 47.0 %    MCV 86 78 - 100 fl    MCH 29.9 26.5 - 33.0 pg    MCHC 34.6 31.5 - 36.5 g/dL    RDW 11.9 10.0 - 15.0 %    Platelet Count 366 150 - 450 10e9/L    Diff Method Automated Method     % Neutrophils 71.8 %    % Lymphocytes 19.7 %    % Monocytes 6.0 %    % Eosinophils 0.8 %    % Basophils 0.4 %    % Immature Granulocytes 1.3 %    Absolute Neutrophil 8.6 (H) 1.6 - 8.3 10e9/L    Absolute Lymphocytes 2.3 0.8 - 5.3 10e9/L    Absolute  Monocytes 0.7 0.0 - 1.3 10e9/L    Absolute Eosinophils 0.1 0.0 - 0.7 10e9/L    Absolute Basophils 0.1 0.0 - 0.2 10e9/L    Abs Immature Granulocytes 0.2 0 - 0.4 10e9/L   ABO and Rh     Status: None   Result Value Ref Range    ABO O     RH(D) Pos     Specimen Expires 2021    Antibody screen     Status: None   Result Value Ref Range    Antibody Screen Neg         Assessment/Plan:  Ms. Amanda Mckeon is a 30 year old  at 7w5d by last menstrual period, confirmed on ultrasound today, here for new OB visit. Pregnancy is complicated by hypothyroidism.  Recently diagnosed with melanoma in situ on her left thigh.    (Z3A.01) Less than 8 weeks gestation of pregnancy  (primary encounter diagnosis)  Comment: labs completed as noted.  Plan: UA reflex to Microscopic, ABO and Rh, Rubella         Antibody IgG Quantitative, Treponema Abs w         Reflex to RPR and Titer, Hepatitis B surface         antigen, HIV Antigen Antibody Combo, CBC with         platelets differential, Antibody screen        She will check with her insurance to see if the [employer] screen is covered and will consider completing that at next visit.  Plan repeat  given past history of gestational diabetes and failure to progress.  Urine Gonorrhea/Chlamydia at next visit.    (E03.9) Hypothyroidism, unspecified type  Comment: stable.   Plan: TSH Reflex [facility]        Recheck TSH today as above.        Follow up in 4 weeks.    Liza Ordoñez MD on 3/5/2021 at 3:53 PM

## 2021-03-05 NOTE — NURSING NOTE
Patient presents to clinic with her  Andrew for OB Physical.  She is at 7 weeks and 5 days.  Medication Reconciliation: complete    Kavitha Avila LPN

## 2021-03-07 LAB — T PALLIDUM AB SER QL: NONREACTIVE

## 2021-03-08 ENCOUNTER — MYC MEDICAL ADVICE (OUTPATIENT)
Dept: FAMILY MEDICINE | Facility: OTHER | Age: 31
End: 2021-03-08

## 2021-03-08 LAB
HBV SURFACE AG SERPL QL IA: NONREACTIVE
HIV 1+2 AB+HIV1 P24 AG SERPL QL IA: NONREACTIVE
RUBV IGG SERPL IA-ACNC: 14 IU/ML

## 2021-03-08 NOTE — TELEPHONE ENCOUNTER
Patient has questions about guide line for a coworker who has shingles and she is pregnant. Please advise. Bri Garcia LPN ....................3/8/2021  3:16 PM

## 2021-03-08 NOTE — TELEPHONE ENCOUNTER
Since she has had the Chicken pox vaccine, she should be immune to getting this illness from exposure to her coworker.  She cannot catch shingles from her coworker.  Liza Ordoñez MD

## 2021-03-18 ENCOUNTER — MYC MEDICAL ADVICE (OUTPATIENT)
Dept: FAMILY MEDICINE | Facility: OTHER | Age: 31
End: 2021-03-18

## 2021-03-19 ENCOUNTER — MYC MEDICAL ADVICE (OUTPATIENT)
Dept: FAMILY MEDICINE | Facility: OTHER | Age: 31
End: 2021-03-19

## 2021-03-23 ENCOUNTER — MYC MEDICAL ADVICE (OUTPATIENT)
Dept: FAMILY MEDICINE | Facility: OTHER | Age: 31
End: 2021-03-23

## 2021-03-24 ENCOUNTER — TRANSFERRED RECORDS (OUTPATIENT)
Dept: HEALTH INFORMATION MANAGEMENT | Facility: OTHER | Age: 31
End: 2021-03-24

## 2021-03-24 NOTE — TELEPHONE ENCOUNTER
Provider is  out today . Would another provider answer question below. ? Bri Garcia LPN ....................3/24/2021  7:59 AM

## 2021-03-25 ENCOUNTER — TELEPHONE (OUTPATIENT)
Dept: FAMILY MEDICINE | Facility: OTHER | Age: 31
End: 2021-03-25

## 2021-03-25 NOTE — TELEPHONE ENCOUNTER
If I could leave her in my schedule on 4/2/2021 at 4:20, I would appreciate it.    Liza Ordoñez MD

## 2021-03-25 NOTE — TELEPHONE ENCOUNTER
Patient looking for a work in as late in the day as possible for next week. Patient had an appointment scheduled on April 2, 2021at 4:20pm that was cancelled due Dr. Ordoñez's schedule change. Patient was offered Tuesday April 6th at 4:20pm and patient declined as she did not want to wait that long to be seen.     Please advise.     Kelsy Griffin on 3/25/2021 at 3:42 PM

## 2021-03-26 NOTE — TELEPHONE ENCOUNTER
Patient notified she can be seen for ob check next Friday April 2 at 4:20 for OB check.  Patient will arrive at that time and call with any questions or concerns.    Kavitha Avila LPN............3/26/2021 9:12 AM

## 2021-04-02 ENCOUNTER — PRENATAL OFFICE VISIT (OUTPATIENT)
Dept: FAMILY MEDICINE | Facility: OTHER | Age: 31
End: 2021-04-02
Attending: FAMILY MEDICINE
Payer: COMMERCIAL

## 2021-04-02 VITALS
HEIGHT: 65 IN | DIASTOLIC BLOOD PRESSURE: 70 MMHG | SYSTOLIC BLOOD PRESSURE: 124 MMHG | BODY MASS INDEX: 34.16 KG/M2 | WEIGHT: 205 LBS | RESPIRATION RATE: 20 BRPM | TEMPERATURE: 98.2 F | HEART RATE: 90 BPM

## 2021-04-02 DIAGNOSIS — E03.9 HYPOTHYROIDISM, UNSPECIFIED TYPE: ICD-10-CM

## 2021-04-02 DIAGNOSIS — Z3A.11 11 WEEKS GESTATION OF PREGNANCY: Primary | ICD-10-CM

## 2021-04-02 LAB
ALBUMIN UR-MCNC: NEGATIVE MG/DL
APPEARANCE UR: CLEAR
BILIRUB UR QL STRIP: NEGATIVE
COLOR UR AUTO: YELLOW
GLUCOSE UR STRIP-MCNC: NEGATIVE MG/DL
HGB UR QL STRIP: NEGATIVE
KETONES UR STRIP-MCNC: NEGATIVE MG/DL
LEUKOCYTE ESTERASE UR QL STRIP: NEGATIVE
NITRATE UR QL: NEGATIVE
PH UR STRIP: 6.5 PH (ref 5–7)
SOURCE: NORMAL
SP GR UR STRIP: 1.01 (ref 1–1.03)
TSH SERPL DL<=0.05 MIU/L-ACNC: 4.34 IU/ML (ref 0.34–5.6)
UROBILINOGEN UR STRIP-MCNC: NORMAL MG/DL (ref 0–2)

## 2021-04-02 PROCEDURE — 81003 URINALYSIS AUTO W/O SCOPE: CPT | Mod: ZL | Performed by: FAMILY MEDICINE

## 2021-04-02 PROCEDURE — 36415 COLL VENOUS BLD VENIPUNCTURE: CPT | Mod: ZL | Performed by: FAMILY MEDICINE

## 2021-04-02 PROCEDURE — 99207 PR OB VISIT-NO CHARGE - GICH ONLY: CPT | Performed by: FAMILY MEDICINE

## 2021-04-02 PROCEDURE — 84443 ASSAY THYROID STIM HORMONE: CPT | Mod: ZL | Performed by: FAMILY MEDICINE

## 2021-04-02 ASSESSMENT — PAIN SCALES - GENERAL: PAINLEVEL: NO PAIN (0)

## 2021-04-02 ASSESSMENT — MIFFLIN-ST. JEOR: SCORE: 1637.81

## 2021-04-02 NOTE — PROGRESS NOTES
Having a lot of nausea, but no vomiting.  No bleeding or fluid leaking.  No cramping.  [place] test not completed due to significant out of pocket cost for patient ($700).  TSH completed today.  Follow up in 4 weeks for next OB visit, sooner if needed.  Liza Ordoñez MD

## 2021-04-25 ENCOUNTER — HEALTH MAINTENANCE LETTER (OUTPATIENT)
Age: 31
End: 2021-04-25

## 2021-04-30 ENCOUNTER — MYC MEDICAL ADVICE (OUTPATIENT)
Dept: FAMILY MEDICINE | Facility: OTHER | Age: 31
End: 2021-04-30

## 2021-04-30 ENCOUNTER — PRENATAL OFFICE VISIT (OUTPATIENT)
Dept: FAMILY MEDICINE | Facility: OTHER | Age: 31
End: 2021-04-30
Attending: FAMILY MEDICINE
Payer: COMMERCIAL

## 2021-04-30 VITALS
HEART RATE: 87 BPM | DIASTOLIC BLOOD PRESSURE: 80 MMHG | BODY MASS INDEX: 34.66 KG/M2 | WEIGHT: 208 LBS | HEIGHT: 65 IN | TEMPERATURE: 98.4 F | OXYGEN SATURATION: 97 % | RESPIRATION RATE: 20 BRPM | SYSTOLIC BLOOD PRESSURE: 122 MMHG

## 2021-04-30 DIAGNOSIS — Z3A.15 15 WEEKS GESTATION OF PREGNANCY: Primary | ICD-10-CM

## 2021-04-30 DIAGNOSIS — E03.9 HYPOTHYROIDISM, UNSPECIFIED TYPE: ICD-10-CM

## 2021-04-30 LAB
ALBUMIN UR-MCNC: NEGATIVE MG/DL
AMORPH CRY #/AREA URNS HPF: ABNORMAL /HPF
APPEARANCE UR: ABNORMAL
BACTERIA #/AREA URNS HPF: ABNORMAL /HPF
BILIRUB UR QL STRIP: NEGATIVE
COLOR UR AUTO: ABNORMAL
GLUCOSE UR STRIP-MCNC: NEGATIVE MG/DL
HGB UR QL STRIP: NEGATIVE
KETONES UR STRIP-MCNC: 10 MG/DL
LEUKOCYTE ESTERASE UR QL STRIP: NEGATIVE
MUCOUS THREADS #/AREA URNS LPF: PRESENT /LPF
NITRATE UR QL: NEGATIVE
PH UR STRIP: 6.5 PH (ref 5–7)
RBC #/AREA URNS AUTO: 2 /HPF (ref 0–2)
SOURCE: ABNORMAL
SP GR UR STRIP: 1.02 (ref 1–1.03)
SQUAMOUS #/AREA URNS AUTO: 6 /HPF (ref 0–1)
TSH SERPL DL<=0.05 MIU/L-ACNC: 2.75 IU/ML (ref 0.34–5.6)
UROBILINOGEN UR STRIP-MCNC: NORMAL MG/DL (ref 0–2)
WBC #/AREA URNS AUTO: 4 /HPF (ref 0–5)
YEAST #/AREA URNS HPF: ABNORMAL /HPF

## 2021-04-30 PROCEDURE — 84443 ASSAY THYROID STIM HORMONE: CPT | Mod: ZL | Performed by: FAMILY MEDICINE

## 2021-04-30 PROCEDURE — 99207 PR OB VISIT-NO CHARGE - GICH ONLY: CPT | Performed by: FAMILY MEDICINE

## 2021-04-30 PROCEDURE — 81001 URINALYSIS AUTO W/SCOPE: CPT | Mod: ZL | Performed by: FAMILY MEDICINE

## 2021-04-30 PROCEDURE — 81511 FTL CGEN ABNOR FOUR ANAL: CPT | Mod: ZL | Performed by: FAMILY MEDICINE

## 2021-04-30 PROCEDURE — 36415 COLL VENOUS BLD VENIPUNCTURE: CPT | Mod: ZL | Performed by: FAMILY MEDICINE

## 2021-04-30 ASSESSMENT — MIFFLIN-ST. JEOR: SCORE: 1651.42

## 2021-04-30 ASSESSMENT — PAIN SCALES - GENERAL: PAINLEVEL: NO PAIN (0)

## 2021-04-30 NOTE — NURSING NOTE
"Chief Complaint   Patient presents with     Prenatal Care     15 wk 5 d   OB check.    Initial /80 (BP Location: Right arm, Patient Position: Sitting, Cuff Size: Adult Regular)   Pulse 87   Temp 98.4  F (36.9  C) (Tympanic)   Resp 20   Ht 1.638 m (5' 4.5\")   Wt 94.3 kg (208 lb)   LMP 01/10/2021 (Exact Date)   SpO2 97%   BMI 35.15 kg/m   Estimated body mass index is 35.15 kg/m  as calculated from the following:    Height as of this encounter: 1.638 m (5' 4.5\").    Weight as of this encounter: 94.3 kg (208 lb).  Medication Reconciliation: complete    Bri Garcia LPN  "

## 2021-04-30 NOTE — PROGRESS NOTES
Still has some nausea, no vomiting.  No bleeding or fluid leaking.  Starting to feel little flutters of movement.  Quad screen & TSH ordered today.  20 week ultrasound ordered.  Follow up in 4 weeks for next OB visit, sooner if any concerns.  Liza Ordoñez MD

## 2021-05-03 LAB
# FETUSES US: NORMAL
# FETUSES: 1
AFP ADJ MOM AMN: 0.64
AFP SERPL-MCNC: 16 NG/ML
AGE - REPORTED: 32 YR
CURRENT SMOKER: NO
CURRENT SMOKER: NO
DIABETES STATUS PATIENT: NO
EGG DONOR AGE: NO
FAMILY MEMBER DISEASES HX: NO
FAMILY MEMBER DISEASES HX: NO
GA METHOD: NORMAL
GA METHOD: NORMAL
GA: NORMAL WK
HCG MOM SERPL: 0.57
HCG SERPL-ACNC: NORMAL IU/L
HX OF HEREDITARY DISORDERS: NO
IDDM PATIENT QL: NO
INHIBIN A MOM SERPL: 0.47
INHIBIN A SERPL-MCNC: 64 PG/ML
INTEGRATED SCN PATIENT-IMP: NORMAL
IVF PREGNANCY: NO
LMP START DATE: NORMAL
MONOCHORIONIC TWINS: NO
PATHOLOGY STUDY: NORMAL
PREV FETUS DEFECT: NO
SERVICE CMNT-IMP: NO
SPECIMEN DRAWN SERPL: NORMAL
U ESTRIOL MOM SERPL: 1.03
U ESTRIOL SERPL-MCNC: 0.84 NG/ML
VALPROIC/CARBAMAZEPINE STATUS: NO
WEIGHT UNITS: NORMAL

## 2021-05-28 ENCOUNTER — PRENATAL OFFICE VISIT (OUTPATIENT)
Dept: FAMILY MEDICINE | Facility: OTHER | Age: 31
End: 2021-05-28
Attending: FAMILY MEDICINE
Payer: COMMERCIAL

## 2021-05-28 VITALS
BODY MASS INDEX: 35.86 KG/M2 | SYSTOLIC BLOOD PRESSURE: 126 MMHG | RESPIRATION RATE: 20 BRPM | TEMPERATURE: 98 F | OXYGEN SATURATION: 98 % | DIASTOLIC BLOOD PRESSURE: 74 MMHG | WEIGHT: 212.2 LBS | HEART RATE: 91 BPM

## 2021-05-28 DIAGNOSIS — E03.9 HYPOTHYROIDISM, UNSPECIFIED TYPE: ICD-10-CM

## 2021-05-28 DIAGNOSIS — Z3A.19 19 WEEKS GESTATION OF PREGNANCY: Primary | ICD-10-CM

## 2021-05-28 LAB
C TRACH DNA SPEC QL NAA+PROBE: NOT DETECTED
N GONORRHOEA DNA SPEC QL NAA+PROBE: NOT DETECTED
SPECIMEN SOURCE: NORMAL
TSH SERPL DL<=0.05 MIU/L-ACNC: 2.66 IU/ML (ref 0.34–5.6)

## 2021-05-28 PROCEDURE — 99207 PR OB VISIT-NO CHARGE - GICH ONLY: CPT | Performed by: FAMILY MEDICINE

## 2021-05-28 PROCEDURE — 36415 COLL VENOUS BLD VENIPUNCTURE: CPT | Mod: ZL | Performed by: FAMILY MEDICINE

## 2021-05-28 PROCEDURE — 84443 ASSAY THYROID STIM HORMONE: CPT | Mod: ZL | Performed by: FAMILY MEDICINE

## 2021-05-28 PROCEDURE — 87491 CHLMYD TRACH DNA AMP PROBE: CPT | Mod: ZL | Performed by: FAMILY MEDICINE

## 2021-05-28 PROCEDURE — 87591 N.GONORRHOEAE DNA AMP PROB: CPT | Mod: ZL | Performed by: FAMILY MEDICINE

## 2021-05-28 ASSESSMENT — PAIN SCALES - GENERAL: PAINLEVEL: NO PAIN (0)

## 2021-05-28 NOTE — PROGRESS NOTES
Has had some fetal movement.  Normal bleeding or fluid.  No contractions.  Quad screen was normal.  20 week ultrasound scheduled next week.  TSH and Gonorrhea/Chlamydia completed today (previous Gonorrhea/Chlamydia not done at her OB physical).  Follow up in 4 weeks for next visit, sooner if any other concerns arise.  Liza Ordoñez MD

## 2021-05-28 NOTE — NURSING NOTE
"Patient here for OB check  Shahnaz Be LPN ..........5/28/2021 4:01 PM   Chief Complaint   Patient presents with     Prenatal Care     19 5/7wks       Initial /74 (BP Location: Right arm, Patient Position: Sitting, Cuff Size: Adult Regular)   Pulse 91   Temp 98  F (36.7  C) (Tympanic)   Resp 20   Wt 96.3 kg (212 lb 3.2 oz)   LMP 01/10/2021 (Exact Date)   SpO2 98%   BMI 35.86 kg/m   Estimated body mass index is 35.86 kg/m  as calculated from the following:    Height as of 4/30/21: 1.638 m (5' 4.5\").    Weight as of this encounter: 96.3 kg (212 lb 3.2 oz).  Medication Reconciliation: complete    Shahnaz Be LPN    "

## 2021-06-01 ENCOUNTER — HOSPITAL ENCOUNTER (OUTPATIENT)
Dept: ULTRASOUND IMAGING | Facility: OTHER | Age: 31
Discharge: HOME | End: 2021-06-01
Attending: FAMILY MEDICINE | Admitting: FAMILY MEDICINE
Payer: COMMERCIAL

## 2021-06-01 DIAGNOSIS — Z3A.15 15 WEEKS GESTATION OF PREGNANCY: ICD-10-CM

## 2021-06-01 PROCEDURE — 76805 OB US >/= 14 WKS SNGL FETUS: CPT

## 2021-06-25 ENCOUNTER — PRENATAL OFFICE VISIT (OUTPATIENT)
Dept: FAMILY MEDICINE | Facility: OTHER | Age: 31
End: 2021-06-25
Attending: FAMILY MEDICINE
Payer: COMMERCIAL

## 2021-06-25 VITALS
OXYGEN SATURATION: 98 % | DIASTOLIC BLOOD PRESSURE: 82 MMHG | RESPIRATION RATE: 16 BRPM | HEIGHT: 64 IN | BODY MASS INDEX: 36.47 KG/M2 | SYSTOLIC BLOOD PRESSURE: 122 MMHG | TEMPERATURE: 97.9 F | WEIGHT: 213.6 LBS | HEART RATE: 84 BPM

## 2021-06-25 DIAGNOSIS — E03.9 HYPOTHYROIDISM, UNSPECIFIED TYPE: ICD-10-CM

## 2021-06-25 DIAGNOSIS — Z3A.23 23 WEEKS GESTATION OF PREGNANCY: Primary | ICD-10-CM

## 2021-06-25 PROCEDURE — 99207 PR OB VISIT-NO CHARGE - GICH ONLY: CPT | Performed by: FAMILY MEDICINE

## 2021-06-25 ASSESSMENT — PAIN SCALES - GENERAL: PAINLEVEL: NO PAIN (0)

## 2021-06-25 ASSESSMENT — MIFFLIN-ST. JEOR: SCORE: 1676.63

## 2021-06-25 NOTE — NURSING NOTE
"Patient presents to the clinic today for prenatal care 23w 5d..  Marina Chong LPN 6/25/2021   4:03 PM    Chief Complaint   Patient presents with     Prenatal Care     23w 5d       Initial /82 (BP Location: Right arm, Patient Position: Sitting, Cuff Size: Adult Regular)   Pulse 84   Temp 97.9  F (36.6  C) (Tympanic)   Resp 16   Ht 1.638 m (5' 4.49\")   Wt 96.9 kg (213 lb 9.6 oz)   LMP 01/10/2021 (Exact Date)   SpO2 98%   Breastfeeding No   BMI 36.11 kg/m   Estimated body mass index is 36.11 kg/m  as calculated from the following:    Height as of this encounter: 1.638 m (5' 4.49\").    Weight as of this encounter: 96.9 kg (213 lb 9.6 oz).  Medication Reconciliation: complete  Marina Chong LPN    "

## 2021-06-25 NOTE — PROGRESS NOTES
Baby is active.  No bleeding or fluid leaking.  Occasional BH contractions. Recent ultrasound reviewed and was normal.  She is going to return to clinic for a lab visit for TSH, 1 hour GTT, hemoglobin and treponema pallidum next week.  Will plan Tdap next month.  Follow up in 4 weeks, sooner if any other concerns.  Liza Ordoñez MD

## 2021-06-29 ENCOUNTER — MYC MEDICAL ADVICE (OUTPATIENT)
Dept: FAMILY MEDICINE | Facility: OTHER | Age: 31
End: 2021-06-29

## 2021-06-29 DIAGNOSIS — Z3A.23 23 WEEKS GESTATION OF PREGNANCY: ICD-10-CM

## 2021-06-29 DIAGNOSIS — E03.9 HYPOTHYROIDISM, UNSPECIFIED TYPE: ICD-10-CM

## 2021-06-29 DIAGNOSIS — O99.810 ABNORMAL GLUCOSE TOLERANCE IN PREGNANCY: Primary | ICD-10-CM

## 2021-06-29 DIAGNOSIS — Z3A.01 LESS THAN 8 WEEKS GESTATION OF PREGNANCY: ICD-10-CM

## 2021-06-29 LAB
ALBUMIN UR-MCNC: 10 MG/DL
APPEARANCE UR: ABNORMAL
BACTERIA #/AREA URNS HPF: ABNORMAL /HPF
BILIRUB UR QL STRIP: NEGATIVE
COLOR UR AUTO: YELLOW
GLUCOSE 1H P 50 G GLC PO SERPL-MCNC: 208 MG/DL (ref 60–129)
GLUCOSE UR STRIP-MCNC: 300 MG/DL
HGB BLD-MCNC: 12.2 G/DL (ref 11.7–15.7)
HGB UR QL STRIP: NEGATIVE
KETONES UR STRIP-MCNC: NEGATIVE MG/DL
LEUKOCYTE ESTERASE UR QL STRIP: NEGATIVE
MUCOUS THREADS #/AREA URNS LPF: PRESENT /LPF
NITRATE UR QL: NEGATIVE
PH UR STRIP: 5.5 PH (ref 5–7)
RBC #/AREA URNS AUTO: 4 /HPF (ref 0–2)
SOURCE: ABNORMAL
SP GR UR STRIP: 1.03 (ref 1–1.03)
SQUAMOUS #/AREA URNS AUTO: 57 /HPF (ref 0–1)
T PALLIDUM AB SER QL: NONREACTIVE
TSH SERPL DL<=0.05 MIU/L-ACNC: 2.13 IU/ML (ref 0.34–5.6)
UROBILINOGEN UR STRIP-MCNC: NORMAL MG/DL (ref 0–2)
WBC #/AREA URNS AUTO: 5 /HPF (ref 0–5)

## 2021-06-29 PROCEDURE — 85018 HEMOGLOBIN: CPT | Mod: ZL | Performed by: FAMILY MEDICINE

## 2021-06-29 PROCEDURE — 81001 URINALYSIS AUTO W/SCOPE: CPT | Mod: ZL | Performed by: FAMILY MEDICINE

## 2021-06-29 PROCEDURE — 36415 COLL VENOUS BLD VENIPUNCTURE: CPT | Mod: ZL | Performed by: FAMILY MEDICINE

## 2021-06-29 PROCEDURE — 84443 ASSAY THYROID STIM HORMONE: CPT | Mod: ZL | Performed by: FAMILY MEDICINE

## 2021-06-29 PROCEDURE — 86780 TREPONEMA PALLIDUM: CPT | Mod: ZL | Performed by: FAMILY MEDICINE

## 2021-06-29 PROCEDURE — 82950 GLUCOSE TEST: CPT | Mod: ZL | Performed by: FAMILY MEDICINE

## 2021-07-08 DIAGNOSIS — O99.810 ABNORMAL GLUCOSE TOLERANCE IN PREGNANCY: ICD-10-CM

## 2021-07-08 LAB
GLUCOSE 1H P 100 G GLC PO SERPL-MCNC: 231 MG/DL (ref 60–179)
GLUCOSE 2H P 100 G GLC PO SERPL-MCNC: 179 MG/DL (ref 60–154)
GLUCOSE 3H P 100 G GLC PO SERPL-MCNC: 177 MG/DL (ref 60–139)
GLUCOSE P FAST SERPL-MCNC: 103 MG/DL (ref 60–94)

## 2021-07-08 PROCEDURE — 82951 GLUCOSE TOLERANCE TEST (GTT): CPT | Mod: ZL | Performed by: FAMILY MEDICINE

## 2021-07-08 PROCEDURE — 82952 GTT-ADDED SAMPLES: CPT | Mod: ZL | Performed by: FAMILY MEDICINE

## 2021-07-11 DIAGNOSIS — O24.419 GESTATIONAL DIABETES MELLITUS (GDM) IN THIRD TRIMESTER, GESTATIONAL DIABETES METHOD OF CONTROL UNSPECIFIED: Primary | ICD-10-CM

## 2021-07-11 RX ORDER — GLUCOSAMINE HCL/CHONDROITIN SU 500-400 MG
CAPSULE ORAL
Qty: 200 EACH | Refills: 11 | Status: SHIPPED | OUTPATIENT
Start: 2021-07-11 | End: 2021-11-08

## 2021-07-11 RX ORDER — LANCING DEVICE
EACH MISCELLANEOUS
Qty: 1 EACH | Refills: 0 | Status: SHIPPED | OUTPATIENT
Start: 2021-07-11 | End: 2021-11-08

## 2021-07-11 RX ORDER — LANCETS
EACH MISCELLANEOUS
Qty: 200 EACH | Refills: 6 | Status: SHIPPED | OUTPATIENT
Start: 2021-07-11 | End: 2021-11-08

## 2021-07-13 ENCOUNTER — MYC MEDICAL ADVICE (OUTPATIENT)
Dept: FAMILY MEDICINE | Facility: OTHER | Age: 31
End: 2021-07-13

## 2021-07-26 ENCOUNTER — PRENATAL OFFICE VISIT (OUTPATIENT)
Dept: FAMILY MEDICINE | Facility: OTHER | Age: 31
End: 2021-07-26
Attending: FAMILY MEDICINE
Payer: COMMERCIAL

## 2021-07-26 VITALS
HEIGHT: 65 IN | OXYGEN SATURATION: 98 % | BODY MASS INDEX: 35.65 KG/M2 | TEMPERATURE: 97.7 F | WEIGHT: 214 LBS | SYSTOLIC BLOOD PRESSURE: 112 MMHG | DIASTOLIC BLOOD PRESSURE: 70 MMHG | HEART RATE: 81 BPM | RESPIRATION RATE: 20 BRPM

## 2021-07-26 DIAGNOSIS — E03.9 HYPOTHYROIDISM, UNSPECIFIED TYPE: ICD-10-CM

## 2021-07-26 DIAGNOSIS — Z3A.28 28 WEEKS GESTATION OF PREGNANCY: Primary | ICD-10-CM

## 2021-07-26 DIAGNOSIS — O24.419 GESTATIONAL DIABETES MELLITUS (GDM) IN THIRD TRIMESTER, GESTATIONAL DIABETES METHOD OF CONTROL UNSPECIFIED: ICD-10-CM

## 2021-07-26 DIAGNOSIS — Z23 NEED FOR TDAP VACCINATION: ICD-10-CM

## 2021-07-26 DIAGNOSIS — Z34.81 PRENATAL CARE, SUBSEQUENT PREGNANCY IN FIRST TRIMESTER: ICD-10-CM

## 2021-07-26 LAB
ALBUMIN UR-MCNC: NEGATIVE MG/DL
APPEARANCE UR: CLEAR
BILIRUB UR QL STRIP: NEGATIVE
COLOR UR AUTO: NORMAL
GLUCOSE UR STRIP-MCNC: NEGATIVE MG/DL
HGB UR QL STRIP: NEGATIVE
KETONES UR STRIP-MCNC: NEGATIVE MG/DL
LEUKOCYTE ESTERASE UR QL STRIP: NEGATIVE
NITRATE UR QL: NEGATIVE
PH UR STRIP: 6 [PH] (ref 5–9)
SP GR UR STRIP: 1.02 (ref 1–1.03)
TSH SERPL DL<=0.005 MIU/L-ACNC: 0.98 MU/L (ref 0.4–4)
UROBILINOGEN UR STRIP-MCNC: NORMAL MG/DL

## 2021-07-26 PROCEDURE — 81003 URINALYSIS AUTO W/O SCOPE: CPT | Mod: ZL | Performed by: FAMILY MEDICINE

## 2021-07-26 PROCEDURE — 90715 TDAP VACCINE 7 YRS/> IM: CPT | Performed by: FAMILY MEDICINE

## 2021-07-26 PROCEDURE — 84443 ASSAY THYROID STIM HORMONE: CPT | Mod: ZL | Performed by: FAMILY MEDICINE

## 2021-07-26 PROCEDURE — 36415 COLL VENOUS BLD VENIPUNCTURE: CPT | Mod: ZL | Performed by: FAMILY MEDICINE

## 2021-07-26 PROCEDURE — 90471 IMMUNIZATION ADMIN: CPT | Performed by: FAMILY MEDICINE

## 2021-07-26 PROCEDURE — 99207 PR OB VISIT-NO CHARGE - GICH ONLY: CPT | Performed by: FAMILY MEDICINE

## 2021-07-26 RX ORDER — PRENATAL VIT/IRON FUM/FOLIC AC 27MG-0.8MG
1 TABLET ORAL DAILY
Qty: 90 TABLET | Refills: 3 | Status: SHIPPED | OUTPATIENT
Start: 2021-07-26 | End: 2022-10-03

## 2021-07-26 ASSESSMENT — PAIN SCALES - GENERAL: PAINLEVEL: NO PAIN (0)

## 2021-07-26 ASSESSMENT — MIFFLIN-ST. JEOR: SCORE: 1678.64

## 2021-07-26 NOTE — PROGRESS NOTES
S)  Gestational diabetes confirmed with labs between now and her last visit.  Fastin-115 (10 of 13 were over, but the past 3 days were under 95)  2 hours after breakfast:   (2 of 13 were over 120)  2 hours after lunch:   (3 of 13 were over 120)  2 hours after supper:   (5 of 12 were over 120)    No bleeding or fluid leaking.  Occasional BH.    O) see flowsheet    A/P)  (Z3A.28) 28 weeks gestation of pregnancy  (primary encounter diagnosis)  Comment: blood sugars mostly in target range.  Will reassess at next visit.  She is continuing to work on diet.  Consider starting oral agent if more than 1/2 readings high at next visit.  Tdap updated today.  Growth ultrasound ordered to be completed at 32 weeks with diagnosis of gestational diabetes.  Will plan another growth ultrasound at 36 week.  Also will start weekly BPPs starting at 32 weeks.  These are ordered.  Plan: US OB > 14 Weeks, US OB Fetal Biophys Prf wo         NonStrs Singls Sgl, Ob/Gyn Referral, UA reflex         to Microscopic        As above.    (O24.419) Gestational diabetes mellitus (GDM) in third trimester, gestational diabetes method of control unspecified  Plan: see above.    (E03.9) Hypothyroidism, unspecified type  Comment:     Plan: TSH Reflex [facility]        TSH as above.    (Z23) Need for Tdap vaccination  Comment:   Plan: TDAP VACCINE (Adacel, Boostrix)  [[id number]]        TDap updated.    Liza Ordoñez MD on 2021 at 5:42 PM

## 2021-07-26 NOTE — NURSING NOTE
"Chief Complaint   Patient presents with     Prenatal Care     28 w 1 d     FOOD SECURITY SCREENING QUESTIONS  Hunger Vital Signs:  Within the past 12 months we worried whether our food would run out before we got money to buy more. Never  Within the past 12 months the food we bought just didn't last and we didn't have money to get more. Never  Bri Garcia LPN 7/26/2021 4:10 PM    Initial /70 (BP Location: Right arm, Patient Position: Sitting, Cuff Size: Adult Large)   Pulse 81   Temp 97.7  F (36.5  C) (Tympanic)   Resp 20   Ht 1.638 m (5' 4.5\")   Wt 97.1 kg (214 lb)   LMP 01/10/2021 (Exact Date)   SpO2 98%   BMI 36.17 kg/m   Estimated body mass index is 36.17 kg/m  as calculated from the following:    Height as of this encounter: 1.638 m (5' 4.5\").    Weight as of this encounter: 97.1 kg (214 lb).  Medication Reconciliation: complete    Bri Garcia LPN  "

## 2021-07-31 ENCOUNTER — MYC MEDICAL ADVICE (OUTPATIENT)
Dept: FAMILY MEDICINE | Facility: OTHER | Age: 31
End: 2021-07-31

## 2021-07-31 DIAGNOSIS — O24.419 GESTATIONAL DIABETES MELLITUS (GDM) IN THIRD TRIMESTER, GESTATIONAL DIABETES METHOD OF CONTROL UNSPECIFIED: Primary | ICD-10-CM

## 2021-07-31 DIAGNOSIS — Z34.83 PRENATAL CARE, SUBSEQUENT PREGNANCY IN THIRD TRIMESTER: ICD-10-CM

## 2021-08-03 ENCOUNTER — PRENATAL OFFICE VISIT (OUTPATIENT)
Dept: OBGYN | Facility: OTHER | Age: 31
End: 2021-08-03
Attending: FAMILY MEDICINE
Payer: COMMERCIAL

## 2021-08-03 VITALS
SYSTOLIC BLOOD PRESSURE: 114 MMHG | DIASTOLIC BLOOD PRESSURE: 72 MMHG | BODY MASS INDEX: 35.59 KG/M2 | HEART RATE: 85 BPM | RESPIRATION RATE: 20 BRPM | WEIGHT: 210.6 LBS

## 2021-08-03 DIAGNOSIS — Z3A.28 28 WEEKS GESTATION OF PREGNANCY: ICD-10-CM

## 2021-08-03 DIAGNOSIS — O24.410 GDM (GESTATIONAL DIABETES MELLITUS), CLASS A1: ICD-10-CM

## 2021-08-03 DIAGNOSIS — Z98.891 HISTORY OF C-SECTION: Primary | ICD-10-CM

## 2021-08-03 PROCEDURE — 99207 PR OB VISIT-NO CHARGE - GICH ONLY: CPT | Performed by: OBSTETRICS & GYNECOLOGY

## 2021-08-03 RX ORDER — CARBOPROST TROMETHAMINE 250 UG/ML
250 INJECTION, SOLUTION INTRAMUSCULAR
Status: CANCELLED | OUTPATIENT
Start: 2021-08-03

## 2021-08-03 RX ORDER — OXYTOCIN 10 [USP'U]/ML
10 INJECTION, SOLUTION INTRAMUSCULAR; INTRAVENOUS
Status: CANCELLED | OUTPATIENT
Start: 2021-08-03

## 2021-08-03 RX ORDER — LIDOCAINE 40 MG/G
CREAM TOPICAL
Status: CANCELLED | OUTPATIENT
Start: 2021-08-03

## 2021-08-03 RX ORDER — TRANEXAMIC ACID 10 MG/ML
1 INJECTION, SOLUTION INTRAVENOUS EVERY 30 MIN PRN
Status: CANCELLED | OUTPATIENT
Start: 2021-08-03

## 2021-08-03 RX ORDER — ACETAMINOPHEN 325 MG/1
975 TABLET ORAL ONCE
Status: CANCELLED | OUTPATIENT
Start: 2021-08-03 | End: 2021-08-03

## 2021-08-03 RX ORDER — METHYLERGONOVINE MALEATE 0.2 MG/ML
200 INJECTION INTRAVENOUS
Status: CANCELLED | OUTPATIENT
Start: 2021-08-03

## 2021-08-03 RX ORDER — MISOPROSTOL 100 UG/1
400 TABLET ORAL
Status: CANCELLED | OUTPATIENT
Start: 2021-08-03

## 2021-08-03 RX ORDER — SODIUM CHLORIDE, SODIUM LACTATE, POTASSIUM CHLORIDE, CALCIUM CHLORIDE 600; 310; 30; 20 MG/100ML; MG/100ML; MG/100ML; MG/100ML
INJECTION, SOLUTION INTRAVENOUS CONTINUOUS
Status: CANCELLED | OUTPATIENT
Start: 2021-08-03

## 2021-08-03 RX ORDER — CEFAZOLIN SODIUM 2 G/100ML
2 INJECTION, SOLUTION INTRAVENOUS
Status: CANCELLED | OUTPATIENT
Start: 2021-08-03

## 2021-08-03 RX ORDER — OXYTOCIN/0.9 % SODIUM CHLORIDE 30/500 ML
100-340 PLASTIC BAG, INJECTION (ML) INTRAVENOUS CONTINUOUS PRN
Status: CANCELLED | OUTPATIENT
Start: 2021-08-03

## 2021-08-03 RX ORDER — CEFAZOLIN SODIUM 2 G/100ML
2 INJECTION, SOLUTION INTRAVENOUS SEE ADMIN INSTRUCTIONS
Status: CANCELLED | OUTPATIENT
Start: 2021-08-03

## 2021-08-03 RX ORDER — CITRIC ACID/SODIUM CITRATE 334-500MG
30 SOLUTION, ORAL ORAL
Status: CANCELLED | OUTPATIENT
Start: 2021-08-03

## 2021-08-03 RX ORDER — OXYTOCIN/0.9 % SODIUM CHLORIDE 30/500 ML
340 PLASTIC BAG, INJECTION (ML) INTRAVENOUS CONTINUOUS PRN
Status: CANCELLED | OUTPATIENT
Start: 2021-08-03

## 2021-08-03 ASSESSMENT — PAIN SCALES - GENERAL: PAINLEVEL: NO PAIN (0)

## 2021-08-03 NOTE — PROGRESS NOTES
OB Consult Note    Amanda Mckeon is referred by Dr. SUAREZ due to request for repeat  section.    She is a 30 year old     Patient's last menstrual period was 01/10/2021 (exact date). 29w2d    Her prior obstetrical history is notable for  for FTP and non-reassuring fetal status two years ago.  Both pregnancies complicated by GDM-diet.    Past Medical History:   Diagnosis Date     Cyst of right ovary     2016     Herpesviral vesicular dermatitis     2016     Hypothyroidism     2015     Past Surgical History:   Procedure Laterality Date     ADENOIDECTOMY      as a child     AS SHOULDER ARTHROSCOPY, DX Right     Dr. Bolivar - age 12      SECTION N/A 8/3/2019    Procedure:  SECTION;  Surgeon: Omar Flower MD;  Location: [facility]     MAMMOPLASTY REDUCTION           PE TUBES      as a child     Current Outpatient Medications   Medication     acyclovir (ZOVIRAX) 800 MG tablet     alcohol swab prep pads     blood glucose (NO BRAND SPECIFIED) lancing device     blood glucose (NO BRAND SPECIFIED) test strip     blood glucose calibration (NO BRAND SPECIFIED) solution     blood glucose monitoring (NO BRAND SPECIFIED) meter device kit     fluticasone (FLONASE) 50 MCG/ACT nasal spray     folic acid (FOLVITE) 1 MG tablet     levothyroxine (SYNTHROID/LEVOTHROID) 125 MCG tablet     Prenatal Vit-Fe Fumarate-FA (PRENATAL MULTIVITAMIN W/IRON) 27-0.8 MG tablet     thin (NO BRAND SPECIFIED) lancets     No current facility-administered medications for this visit.     Allergies   Allergen Reactions     Amoxicillin Hives     Childhood       REVIEW OF SYSTEMS  Neg    Exam:  Constitutional: healthy, alert, active and no distress  FH: 34 cm  FHT: 150      I/P:  (Z98.891) History of   (primary encounter diagnosis)  Comment:   Plan: lidocaine 1 % 0.1-1 mL, lidocaine (LMX4) cream,        sodium chloride (PF) 0.9% PF flush 3 mL, sodium        chloride (PF) 0.9% PF flush 3 mL, lactated          ringers infusion, sodium citrate-citric acid         (BICITRA) solution 30 mL, acetaminophen         (TYLENOL) tablet 975 mg, ceFAZolin (ANCEF)         intermittent infusion 2 g in 100 mL dextrose         PRE-MIX, ceFAZolin (ANCEF) intermittent         infusion 2 g in 100 mL dextrose PRE-MIX,         oxytocin (PITOCIN) 30 units in 500 mL 0.9% NaCl        infusion, oxytocin (PITOCIN) injection 10         Units, oxytocin (PITOCIN) 30 units in 500 mL         0.9% NaCl infusion, oxytocin (PITOCIN)         injection 10 Units, misoprostol (CYTOTEC)         tablet 400 mcg, misoprostol (CYTOTEC)         suppository 800 mcg, tranexamic acid 1 g in 100        mL 0.7% NaCl IV bag (premix), methylergonovine         (METHERGINE) injection 200 mcg, carboprost         (HEMABATE) injection 250 mcg, Case Request:          SECTION            (Z3A.28) 28 weeks gestation of pregnancy  Comment:   Plan: Plan for repeat  at 39 weeks, scheduled for 10/11/21  She will resume PN visits with [facility], but would like one more OB check with me prior to surgery at 38 weeks.    Appreciate the kind referral.

## 2021-08-03 NOTE — PROGRESS NOTES
"Date of Surgery: 10/11/2021  Type of Surgery: Repeat   Surgeon: Dr. Omar Flower     appropriate appointments were scheduled by the Unit [facility]  Patient was given surgical folder  which includes pre-operative bathing instructions related to the two packets of Hibiclens surgical prep provided.. Surgical forms were copied and kept for informative purposes. Originals were delivered to Day-surgery. Questions were answered to the best of this nurse's ability.        STOP BANG    Fever/Chills or other infectious symptoms in past month? no  >10 pound weight loss in the past 2 months? no  Health Care Directive on file? no  History of blood transfusions? no  Td up to date? yes  History of VRE/MRSA? no      Obstructive Sleep Apnea screening    Preoperative Evaluation: Obstructive Sleep Apnea screening    S: Snore -  Do you snore loudly? (louder than talking or loud enough to be heard through closed doors) No  T: Tired - Do you often feel tired, fatigued, or sleepy during the daytime?No  O: Observed - Has anyone ever observed you stop breathing during your sleep?No  P: Pressure - Do you have or are you being treated for high blood pressure?No  B: BMI - BMI greater than 35kg/m2?No  A: Age - Age over 50 years old?No  N: Neck - Neck circumference greater than 40 cm?No  G: Gender - Gender: Male?No    Total number of \"YES\" responses:  0    Scoring: Low risk of WANDA 0-2  At Risk of WANDA: >3 High Risk of WANDA: 5-8      Total yes answers in WANDA section:    Low risk 0-2  At risk 3-4  High risk 5-8    Diana Aviles RN............. 8/3/2021 9:13 AM     "

## 2021-08-03 NOTE — NURSING NOTE
"Chief Complaint   Patient presents with     Prenatal Care     29w2d   Patient presents to clinic for prenatal care and to discuss  section. First child was born via  2 years ago.    Initial /72 (BP Location: Right arm, Patient Position: Sitting, Cuff Size: Adult Large)   Pulse 85   Resp 20   Wt 95.5 kg (210 lb 9.6 oz)   LMP 01/10/2021 (Exact Date)   BMI 35.59 kg/m   Estimated body mass index is 35.59 kg/m  as calculated from the following:    Height as of 21: 1.638 m (5' 4.5\").    Weight as of this encounter: 95.5 kg (210 lb 9.6 oz).  Medication Reconciliation: complete    GURPREET SEO, SARAHYN  "

## 2021-08-04 ENCOUNTER — MYC MEDICAL ADVICE (OUTPATIENT)
Dept: FAMILY MEDICINE | Facility: OTHER | Age: 31
End: 2021-08-04

## 2021-08-23 ENCOUNTER — HOSPITAL ENCOUNTER (OUTPATIENT)
Dept: ULTRASOUND IMAGING | Facility: OTHER | Age: 31
End: 2021-08-23
Attending: FAMILY MEDICINE
Payer: COMMERCIAL

## 2021-08-23 DIAGNOSIS — Z3A.28 28 WEEKS GESTATION OF PREGNANCY: ICD-10-CM

## 2021-08-23 PROCEDURE — 76819 FETAL BIOPHYS PROFIL W/O NST: CPT

## 2021-08-23 PROCEDURE — 76816 OB US FOLLOW-UP PER FETUS: CPT

## 2021-08-26 ENCOUNTER — PRENATAL OFFICE VISIT (OUTPATIENT)
Dept: FAMILY MEDICINE | Facility: OTHER | Age: 31
End: 2021-08-26
Attending: FAMILY MEDICINE
Payer: COMMERCIAL

## 2021-08-26 VITALS
WEIGHT: 210.6 LBS | TEMPERATURE: 98.2 F | RESPIRATION RATE: 16 BRPM | DIASTOLIC BLOOD PRESSURE: 78 MMHG | SYSTOLIC BLOOD PRESSURE: 130 MMHG | OXYGEN SATURATION: 98 % | BODY MASS INDEX: 35.59 KG/M2 | HEART RATE: 72 BPM

## 2021-08-26 DIAGNOSIS — E03.9 HYPOTHYROIDISM, UNSPECIFIED TYPE: ICD-10-CM

## 2021-08-26 DIAGNOSIS — O24.419 GESTATIONAL DIABETES MELLITUS (GDM) IN THIRD TRIMESTER, GESTATIONAL DIABETES METHOD OF CONTROL UNSPECIFIED: ICD-10-CM

## 2021-08-26 DIAGNOSIS — Z3A.32 32 WEEKS GESTATION OF PREGNANCY: Primary | ICD-10-CM

## 2021-08-26 LAB
ALBUMIN UR-MCNC: NEGATIVE MG/DL
APPEARANCE UR: CLEAR
BILIRUB UR QL STRIP: NEGATIVE
COLOR UR AUTO: NORMAL
GLUCOSE UR STRIP-MCNC: NEGATIVE MG/DL
HGB UR QL STRIP: NEGATIVE
KETONES UR STRIP-MCNC: NEGATIVE MG/DL
LEUKOCYTE ESTERASE UR QL STRIP: NEGATIVE
NITRATE UR QL: NEGATIVE
PH UR STRIP: 6 [PH] (ref 5–9)
SP GR UR STRIP: 1.01 (ref 1–1.03)
TSH SERPL DL<=0.005 MIU/L-ACNC: 1.2 MU/L (ref 0.4–4)
UROBILINOGEN UR STRIP-MCNC: NORMAL MG/DL

## 2021-08-26 PROCEDURE — 36415 COLL VENOUS BLD VENIPUNCTURE: CPT | Mod: ZL | Performed by: FAMILY MEDICINE

## 2021-08-26 PROCEDURE — 81003 URINALYSIS AUTO W/O SCOPE: CPT | Mod: ZL | Performed by: FAMILY MEDICINE

## 2021-08-26 PROCEDURE — 99207 PR OB VISIT-NO CHARGE - GICH ONLY: CPT | Performed by: FAMILY MEDICINE

## 2021-08-26 PROCEDURE — 84443 ASSAY THYROID STIM HORMONE: CPT | Mod: ZL | Performed by: FAMILY MEDICINE

## 2021-08-26 ASSESSMENT — PAIN SCALES - GENERAL: PAINLEVEL: NO PAIN (0)

## 2021-08-26 NOTE — NURSING NOTE
"Chief Complaint   Patient presents with     Prenatal Care     32.4 weeks       Initial /78   Pulse 72   Temp 98.2  F (36.8  C) (Temporal)   Resp 16   Wt 95.5 kg (210 lb 9.6 oz)   LMP 01/10/2021 (Exact Date)   SpO2 98%   Breastfeeding No   BMI 35.59 kg/m   Estimated body mass index is 35.59 kg/m  as calculated from the following:    Height as of 7/26/21: 1.638 m (5' 4.5\").    Weight as of this encounter: 95.5 kg (210 lb 9.6 oz).  Medication Reconciliation: complete    FOOD SECURITY SCREENING QUESTIONS  Hunger Vital Signs:  Within the past 12 months we worried whether our food would run out before we got money to buy more. Never  Within the past 12 months the food we bought just didn't last and we didn't have money to get more. Never    Analia Wharton, MARTHA  "

## 2021-08-26 NOTE — PROGRESS NOTES
Baby has been active.  Normal bleeding or fluid leaking.  Occasionally BH contractions.  No regular swelling.  No headaches, vision changes.  BIOPHYSICAL PROFILE this week was 8 with normal fetal growth noted.  Fastin-103 (16/32 were 95+)  2 hours after breakfast:   (only 1/32 were over 130)  2 hours after lunch:  (2/32 were 120 or above)  2 hours after supper:   (12/31 were 120 or above)    TSH today.  Follow up in 2 weeks for next visit.  Liza Ordoñez MD

## 2021-09-03 ENCOUNTER — APPOINTMENT (OUTPATIENT)
Dept: ULTRASOUND IMAGING | Facility: OTHER | Age: 31
End: 2021-09-03
Attending: FAMILY MEDICINE
Payer: COMMERCIAL

## 2021-09-03 ENCOUNTER — HOSPITAL ENCOUNTER (OUTPATIENT)
Dept: ULTRASOUND IMAGING | Facility: OTHER | Age: 31
Discharge: HOME | End: 2021-09-03
Attending: FAMILY MEDICINE | Admitting: FAMILY MEDICINE
Payer: COMMERCIAL

## 2021-09-03 ENCOUNTER — HOSPITAL ENCOUNTER (OUTPATIENT)
Facility: OTHER | Age: 31
LOS: 1 days | Discharge: HOME | End: 2021-09-03
Attending: FAMILY MEDICINE | Admitting: FAMILY MEDICINE
Payer: COMMERCIAL

## 2021-09-03 VITALS — DIASTOLIC BLOOD PRESSURE: 70 MMHG | SYSTOLIC BLOOD PRESSURE: 120 MMHG | RESPIRATION RATE: 18 BRPM | TEMPERATURE: 97.1 F

## 2021-09-03 DIAGNOSIS — O24.419 GESTATIONAL DIABETES MELLITUS (GDM) IN THIRD TRIMESTER, GESTATIONAL DIABETES METHOD OF CONTROL UNSPECIFIED: ICD-10-CM

## 2021-09-03 DIAGNOSIS — Z34.83 PRENATAL CARE, SUBSEQUENT PREGNANCY IN THIRD TRIMESTER: ICD-10-CM

## 2021-09-03 PROBLEM — O28.3 ABNORMAL ULTRASONIC FINDING ON ANTENATAL SCREENING OF MOTHER: Status: ACTIVE | Noted: 2021-09-03

## 2021-09-03 PROBLEM — O24.410 DIET CONTROLLED GESTATIONAL DIABETES MELLITUS (GDM) IN THIRD TRIMESTER: Status: ACTIVE | Noted: 2021-09-03

## 2021-09-03 PROCEDURE — G0463 HOSPITAL OUTPT CLINIC VISIT: HCPCS | Mod: 25

## 2021-09-03 PROCEDURE — 76819 FETAL BIOPHYS PROFIL W/O NST: CPT

## 2021-09-03 PROCEDURE — 76819 FETAL BIOPHYS PROFIL W/O NST: CPT | Mod: 76

## 2021-09-03 PROCEDURE — 99214 OFFICE O/P EST MOD 30 MIN: CPT | Performed by: FAMILY MEDICINE

## 2021-09-03 RX ORDER — PROCHLORPERAZINE 25 MG
25 SUPPOSITORY, RECTAL RECTAL EVERY 12 HOURS PRN
Status: DISCONTINUED | OUTPATIENT
Start: 2021-09-03 | End: 2021-09-03 | Stop reason: HOSPADM

## 2021-09-03 RX ORDER — ONDANSETRON 4 MG/1
4 TABLET, ORALLY DISINTEGRATING ORAL EVERY 6 HOURS PRN
Status: DISCONTINUED | OUTPATIENT
Start: 2021-09-03 | End: 2021-09-03 | Stop reason: HOSPADM

## 2021-09-03 RX ORDER — METOCLOPRAMIDE 10 MG/1
10 TABLET ORAL EVERY 6 HOURS PRN
Status: DISCONTINUED | OUTPATIENT
Start: 2021-09-03 | End: 2021-09-03 | Stop reason: HOSPADM

## 2021-09-03 RX ORDER — PROCHLORPERAZINE MALEATE 10 MG
10 TABLET ORAL EVERY 6 HOURS PRN
Status: DISCONTINUED | OUTPATIENT
Start: 2021-09-03 | End: 2021-09-03 | Stop reason: HOSPADM

## 2021-09-03 RX ORDER — ONDANSETRON 2 MG/ML
4 INJECTION INTRAMUSCULAR; INTRAVENOUS EVERY 6 HOURS PRN
Status: DISCONTINUED | OUTPATIENT
Start: 2021-09-03 | End: 2021-09-03 | Stop reason: HOSPADM

## 2021-09-03 RX ORDER — METOCLOPRAMIDE HYDROCHLORIDE 5 MG/ML
10 INJECTION INTRAMUSCULAR; INTRAVENOUS EVERY 6 HOURS PRN
Status: DISCONTINUED | OUTPATIENT
Start: 2021-09-03 | End: 2021-09-03 | Stop reason: HOSPADM

## 2021-09-03 ASSESSMENT — ACTIVITIES OF DAILY LIVING (ADL): WEAR_GLASSES_OR_BLIND: NO

## 2021-09-03 NOTE — H&P
[facility] OB TRIAGE    Amanda Alfred MRN# [id number]   Age: 30 year old YOB: 1990     Date of Assessment:  9/3/2021    Primary care provider: Liza Ordoñez           Chief Complaint:   Amanda Alfred is a 30 year old female  who is 33w5d pregnant and being evaluated for abnormal BPP.    HPI:  Amanda Alfred is a 30 year old female is a  33w5d being evaluated for:  Abnormal BPP.  I received a phone call from radiology that patient was in for BPP, ordered due to GESTATIONAL DIABETES.  She received 2, points for normal GIOVANNA (12.2).  Patient states that FM is irregular.  Baby was moving quite a bit this morning, less so after lunch.  This was similar to yesterday.    Patient states her BS today are normal.  No trauma.  No fevers, chills  No respiratory symptoms.  No vaginal bleeding or discharge.  Patient with hypothyroidism and has had normal TSH levels during pregnancy.      Patient with history of melanoma.      Had  with first delivery due to repetitive late decelerations.              Pregnancy history:     OBSTETRIC HISTORY:    OB History    Para Term  AB Living   2 1 1 0 0 1   SAB TAB Ectopic Multiple Live Births   0 0 0 0 1      # Outcome Date GA Lbr Rob/2nd Weight Sex Delivery Anes PTL Lv   2 Current            1 Term 19 39w3d  3.033 kg (6 lb 11 oz) M CS-LTranv EPI N RADHA      Complications: Fetal Intolerance      Name: CALLUM ALFRED      Apgar1: 8  Apgar5: 9       EDC: Estimated Date of Delivery: Oct 17, 2021    Prenatal Labs:   Lab Results   Component Value Date    ABO O 2021    RH Pos 2021    AS Neg 2021    HEPBANG Nonreactive 2021    HGB 12.2 2021       GBS Status:   No results found for: GBS    Active Problem List  Patient Active Problem List   Diagnosis     Ganglion cyst     Recurrent cold sores     Hypothyroidism, unspecified type     Chronic seasonal allergic rhinitis,  unspecified trigger     Encounter for contraceptive management, unspecified type     Pregnancy, unspecified gestational age     Gestational diabetes mellitus (GDM) in third trimester, gestational diabetes method of control unspecified     Encounter for triage in pregnant patient     Pregnancy     History of  section     Melanoma in situ of left lower leg (H)     Abnormal ultrasonic finding on  screening of mother     Diet controlled gestational diabetes mellitus (GDM) in third trimester       Medication Prior to Admission  Medications Prior to Admission   Medication Sig Dispense Refill Last Dose     acyclovir (ZOVIRAX) 800 MG tablet Take 1 tablet (800 mg) by mouth 5 times daily 50 tablet 3      alcohol swab prep pads Use to swab area of injection/martir as directed. 200 each 11      blood glucose (NO BRAND SPECIFIED) lancing device Device to be used with lancets. 1 each 0      blood glucose (NO BRAND SPECIFIED) test strip Use to test blood sugar 4 times daily or as directed. To accompany: Blood Glucose Monitor Brands: AccuChek Guide strips. 100 strip 6      blood glucose calibration (NO BRAND SPECIFIED) solution To accompany: Blood Glucose Monitor Brands: per insurance. 1 each 0      blood glucose monitoring (NO BRAND SPECIFIED) meter device kit Use to test blood sugar 4 times daily or as directed. Preferred blood glucose meter OR supplies to accompany: Blood Glucose Monitor Brands: per insurance. 1 kit 0      fluticasone (FLONASE) 50 MCG/ACT nasal spray Spray 2 sprays into both nostrils daily 18.2 mL 11      folic acid (FOLVITE) 1 MG tablet Take 1 tablet (1 mg) by mouth daily 100 tablet 3      levothyroxine (SYNTHROID/LEVOTHROID) 125 MCG tablet Take 1 tablet (125 mcg) by mouth daily 90 tablet 3      Prenatal Vit-Fe Fumarate-FA (PRENATAL MULTIVITAMIN W/IRON) 27-0.8 MG tablet Take 1 tablet by mouth daily 90 tablet 3      thin (NO BRAND SPECIFIED) lancets Use with lanceting device. To accompany: Blood  Glucose Monitor Brands: per insurance. 200 each 6    .        Maternal Past Medical History:     Past Medical History:   Diagnosis Date     Cyst of right ovary     11/11/2016     Diet controlled gestational diabetes mellitus (GDM) in third trimester      Herpesviral vesicular dermatitis     11/11/2016     Hypothyroidism     11/9/2015                       Family History:   The family history includes Bladder Cancer in her maternal grandfather; Diabetes in her paternal grandfather; Family History Negative in her brother, mother, and paternal grandmother; Kidney Disease in her maternal grandmother; Lupus in her father.    Family history   reviewed            Social History:   I have reviewed this patient's social history         Review of Systems:   CONSTITUTIONAL: NEGATIVE for fever, chills, change in weight  INTEGUMENTARY/SKIN: NEGATIVE for worrisome rashes, moles or lesions  RESP: NEGATIVE for significant cough or SOB  CV: NEGATIVE for chest pain, palpitations or peripheral edema  GI: NEGATIVE for nausea, abdominal pain, heartburn, or change in bowel habits  : NEGATIVE for frequency, dysuria, or hematuria  MUSCULOSKELETAL: NEGATIVE for significant arthralgias or myalgia  HEME: NEGATIVE for bleeding problems  PSYCHIATRIC: NEGATIVE for changes in mood or affect          Physical Exam:   Vitals were reviewed LMP 01/10/2021 (Exact Date)                       Constitutional:   awake, alert, cooperative, no apparent distress, and appears stated age     Lungs:   clear     Cardiovascular:   RRR     Abdomen:   Gravid, soft        Fetal Heart Rate Tracing: baseline 120s  accels to 140s  LTV 2-5 BPM  No decels  Category 1     CS 67                 Results for orders placed or performed during the hospital encounter of 09/03/21   US OB Fetal Biophys Prf wo NonStrs Singls Sgl     Status: None    Narrative    Procedure:US OB FETAL BIOPHY PROFILE W/O NON STRESS SINGLE  Date:9/3/2021 5:18 PM    History: schedule week of 10/4;  Gestational diabetes mellitus (GDM) in  third trimester, gestational diabetes method of control unspecified;  Prenatal care, subsequent pregnancy in third trimester    Fetal Movement:  Score 2: At least 3 discrete body/limb movements in 30 minutes  Score 0: Up to 2 episodes of limb/body movements in 30 minutes                    FM = 0    Fetal Breathing movements:  Score 2: At least one episode, at least 30 seconds duration in 30  minutes of observation.  Score 0: Absent or no episodes of greater than 30 seconds    duration in 30 minutes observation.                    FBM = 0    Fetal Tone:  Score 2: At least one episode of active extension with return to     flexion of fetal limbs or trunk, opening and closing of     hands considered normal tone.  Score 0: Absent or no episodes in 30 minutes of observation.                    FT = 0    Amniotic Fluid Volume:  Score 2: At least one pocket of amniotic fluid measuring at least    1 cm in two perpendicular planes.  Score 0: Either no amniotic fluid or a pocket less than 1 cm in    two perpendicular planes.                    AF = 2                        TOTAL = 2    GIOVANNA: 12.2 cm    HRT Rate: 134 bpm    Placenta Location: Fundal    Placenta ndGndrndanddndend:nd nd2nd the fetus is cephalic      Impression    Impression: Biophysical profile score of 2. The on-call provider was  called by the ultrasound technologist with this biophysical profile  score.    NIESHA RYAN MD         SYSTEM ID:  [id number]             Assessment:   Amanda Mckeon is a  33w5d pregnant female evaluated for abnormal BPP  Gestational diabetes  History of .          Plan:   Repeat BPP and cord doppler.   If abnormal, recommend giving betamethasone and transferring to facility with NICU as delivery may be indicated.      ENEIDA GONZALEZ MD

## 2021-09-04 NOTE — PROGRESS NOTES
Patient to [facility] from ultrasound due to 2/8 score for BPP.  To Room 403.  EUM/EFM applied and NST initiated.  Patient states she is gestational diabetic.  Chemstrip checked and was 67.  V.S. stable.  MD aware of patient's arrival to unit.

## 2021-09-04 NOTE — DISCHARGE INSTRUCTIONS
Present to [facility] on Sunday 9/5 for NST and BPP (monitoring and Ultrasound) at 9:00 am.     Please call [facility] and plan to come in if you are not feeling baby move normally [phone].

## 2021-09-04 NOTE — DISCHARGE SUMMARY
[facility]    Discharge Summary    Date of Discharge:  9/3/2021  Discharging Provider: ENEIDA GONZALEZ  Date of Service (when I saw the patient): 21    Discharge Diagnoses   Principal Problem:    Abnormal ultrasonic finding on  screening of mother (9/3/2021)  Active Problems:    Hypothyroidism, unspecified type (8/10/2018)    History of  section (8/3/2019)    Diet controlled gestational diabetes mellitus (GDM) in third trimester (9/3/2021)      History of Present Illness   Amanda Mckeon is an 30 year old female who presented with an abnormal BPP.  She is a  at 33w5d.  She has gestational diabetes and had a BPP done today as an outpt that was 2/8, points for fluid volume only.      Hospital Course   Amanda Mckeon was admitted on 9/3/2021.  The following problems were addressed during her hospitalization:    Patient underwent NST/FM.  This was category 1, reactive NST.  She underwent repeat BPP which was 6/8 - no points for fetal breathing.    Umbilical cord doppler study was within normal limits.      ENEIDA GONZALEZ MD             Primary Care Physician   Liza Ordoñez        Discharge Disposition   Discharged to home  Condition at discharge: Stable      Kick count instructions given.    Low threshold to follow up at [facility] for repeat evaluation.    Follow up on 21 for BPP and NST      Consultations This Hospital Stay   None    Time Spent on this Encounter   I, ENEIDA GONZALEZ MD, personally saw the patient today and spent less than or equal to 30 minutes discharging this patient.    Discharge Orders   No discharge procedures on file.  Discharge Medications   Current Discharge Medication List      CONTINUE these medications which have NOT CHANGED    Details   acyclovir (ZOVIRAX) 800 MG tablet Take 1 tablet (800 mg) by mouth 5 times daily  Qty: 50 tablet, Refills: 3    Associated Diagnoses:  Recurrent cold sores      alcohol swab prep pads Use to swab area of injection/martir as directed.  Qty: 200 each, Refills: 11    Associated Diagnoses: Gestational diabetes mellitus (GDM) in third trimester, gestational diabetes method of control unspecified      blood glucose (NO BRAND SPECIFIED) lancing device Device to be used with lancets.  Qty: 1 each, Refills: 0    Associated Diagnoses: Gestational diabetes mellitus (GDM) in third trimester, gestational diabetes method of control unspecified      blood glucose (NO BRAND SPECIFIED) test strip Use to test blood sugar 4 times daily or as directed. To accompany: Blood Glucose Monitor Brands: AccuChek Guide strips.  Qty: 100 strip, Refills: 6    Associated Diagnoses: Gestational diabetes mellitus (GDM) in third trimester, gestational diabetes method of control unspecified      blood glucose calibration (NO BRAND SPECIFIED) solution To accompany: Blood Glucose Monitor Brands: per insurance.  Qty: 1 each, Refills: 0    Associated Diagnoses: Gestational diabetes mellitus (GDM) in third trimester, gestational diabetes method of control unspecified      blood glucose monitoring (NO BRAND SPECIFIED) meter device kit Use to test blood sugar 4 times daily or as directed. Preferred blood glucose meter OR supplies to accompany: Blood Glucose Monitor Brands: per insurance.  Qty: 1 kit, Refills: 0    Associated Diagnoses: Gestational diabetes mellitus (GDM) in third trimester, gestational diabetes method of control unspecified      fluticasone (FLONASE) 50 MCG/ACT nasal spray Spray 2 sprays into both nostrils daily  Qty: 18.2 mL, Refills: 11    Associated Diagnoses: Seasonal allergic rhinitis, unspecified trigger      folic acid (FOLVITE) 1 MG tablet Take 1 tablet (1 mg) by mouth daily  Qty: 100 tablet, Refills: 3    Associated Diagnoses: Health care maintenance      levothyroxine (SYNTHROID/LEVOTHROID) 125 MCG tablet Take 1 tablet (125 mcg) by mouth daily  Qty: 90 tablet,  Refills: 3    Associated Diagnoses: Hypothyroidism, unspecified type      Prenatal Vit-Fe Fumarate-FA (PRENATAL MULTIVITAMIN W/IRON) 27-0.8 MG tablet Take 1 tablet by mouth daily  Qty: 90 tablet, Refills: 3    Associated Diagnoses: Prenatal care, subsequent pregnancy in first trimester      thin (NO BRAND SPECIFIED) lancets Use with lanceting device. To accompany: Blood Glucose Monitor Brands: per insurance.  Qty: 200 each, Refills: 6    Associated Diagnoses: Gestational diabetes mellitus (GDM) in third trimester, gestational diabetes method of control unspecified           Allergies   Allergies   Allergen Reactions     Amoxicillin Hives     Childhood     Data   Most Recent 3 CBC's:  Recent Labs   Lab Test 06/29/21  0828 03/05/21  1647 01/21/21  0913 08/02/19  0630   WBC  --  11.9* 6.7 8.5   HGB 12.2 13.3 14.0 12.4   MCV  --  86 87 86   PLT  --  366 374 263      Most Recent 3 BMP's:  Recent Labs   Lab Test 01/21/21  0913 12/31/19  0822 06/11/19  1703 01/16/18  1623     --   --  135   POTASSIUM 4.1  --   --  4.0   CHLORIDE 104  --   --  105   CO2 24  --   --  18*   BUN 15  --   --  13   CR 0.73  --   --  0.66*   ANIONGAP 9  --   --   --    HENRY 9.6  --   --  8.2*    115* 88 116*     Most Recent 2 LFT's:  Recent Labs   Lab Test 01/21/21  0913 01/16/18  1623   AST 14  --    ALT 22  --    ALKPHOS 58 44   BILITOTAL 0.5 0.4     Most Recent INR's and Anticoagulation Dosing History:  Anticoagulation Dose History    There is no flowsheet data to display.       Most Recent 3 Troponin's:No lab results found.  Most Recent Cholesterol Panel:  Recent Labs   Lab Test 01/21/21  0913   CHOL 191   *   HDL 43   TRIG 128     Most Recent 6 Bacteria Isolates From Any Culture (See EPIC Reports for Culture Details):  Recent Labs   Lab Test 07/09/19  1707 02/16/18  1808   CULT No Group B Streptococcus isolated >100,000 colonies/mL  Escherichia coli  *     Most Recent TSH, T4 and A1c Labs:  Recent Labs   Lab Test  08/26/21  1651 01/21/21  0913 08/14/20  1450   TSH 1.20  --   --    T4  --   --  0.96   A1C  --  5.1  --      Results for orders placed or performed during the hospital encounter of 09/03/21   US OB Fetal Biophys Prf wo NonStrs Singls Sgl    Narrative    Procedure:US OB FETAL BIOPHY PROFILE W/O NON STRESS SINGLE  Date:9/3/2021 5:18 PM    History: schedule week of 10/4; Gestational diabetes mellitus (GDM) in  third trimester, gestational diabetes method of control unspecified;  Prenatal care, subsequent pregnancy in third trimester    Fetal Movement:  Score 2: At least 3 discrete body/limb movements in 30 minutes  Score 0: Up to 2 episodes of limb/body movements in 30 minutes                    FM = 0    Fetal Breathing movements:  Score 2: At least one episode, at least 30 seconds duration in 30  minutes of observation.  Score 0: Absent or no episodes of greater than 30 seconds    duration in 30 minutes observation.                    FBM = 0    Fetal Tone:  Score 2: At least one episode of active extension with return to     flexion of fetal limbs or trunk, opening and closing of     hands considered normal tone.  Score 0: Absent or no episodes in 30 minutes of observation.                    FT = 0    Amniotic Fluid Volume:  Score 2: At least one pocket of amniotic fluid measuring at least    1 cm in two perpendicular planes.  Score 0: Either no amniotic fluid or a pocket less than 1 cm in    two perpendicular planes.                    AF = 2                        TOTAL = 2    GIOVANNA: 12.2 cm    HRT Rate: 134 bpm    Placenta Location: Fundal    Placenta ndGndrndanddndend:nd nd2nd the fetus is cephalic      Impression    Impression: Biophysical profile score of 2. The on-call provider was  called by the ultrasound technologist with this biophysical profile  score.    NIESHA RYAN MD         SYSTEM ID:  [id number]

## 2021-09-04 NOTE — PROGRESS NOTES
This writer called patient at the request of Dr. Dominguez.  Amanda stated she was feeling fine.  Baby has been active. No LOF or bleeding.  Confirmed NST & BPP for tomorrow.

## 2021-09-04 NOTE — PROGRESS NOTES
Dr. Dominguez present in dept to review strip as well as latest BPP. Dr. MCKEON has given order to discharge pt to home. Pt is to present back to [facility] if she is not feeling baby move normally. She is also to come in Sunday morning at 0900 for NST and BPP. Pt understands all of these instructions and does not have any further questions at this time.

## 2021-09-05 ENCOUNTER — APPOINTMENT (OUTPATIENT)
Dept: ULTRASOUND IMAGING | Facility: OTHER | Age: 31
End: 2021-09-05
Attending: FAMILY MEDICINE
Payer: COMMERCIAL

## 2021-09-05 ENCOUNTER — HOSPITAL ENCOUNTER (OUTPATIENT)
Facility: OTHER | Age: 31
LOS: 1 days | Discharge: HOME | End: 2021-09-05
Attending: FAMILY MEDICINE | Admitting: FAMILY MEDICINE
Payer: COMMERCIAL

## 2021-09-05 VITALS — SYSTOLIC BLOOD PRESSURE: 118 MMHG | DIASTOLIC BLOOD PRESSURE: 68 MMHG | RESPIRATION RATE: 16 BRPM | TEMPERATURE: 97.3 F

## 2021-09-05 PROCEDURE — 76819 FETAL BIOPHYS PROFIL W/O NST: CPT

## 2021-09-05 PROCEDURE — G0463 HOSPITAL OUTPT CLINIC VISIT: HCPCS | Mod: 25

## 2021-09-05 PROCEDURE — 99213 OFFICE O/P EST LOW 20 MIN: CPT | Performed by: FAMILY MEDICINE

## 2021-09-05 RX ORDER — ONDANSETRON 2 MG/ML
4 INJECTION INTRAMUSCULAR; INTRAVENOUS EVERY 6 HOURS PRN
Status: DISCONTINUED | OUTPATIENT
Start: 2021-09-05 | End: 2021-09-05 | Stop reason: HOSPADM

## 2021-09-05 RX ORDER — METOCLOPRAMIDE HYDROCHLORIDE 5 MG/ML
10 INJECTION INTRAMUSCULAR; INTRAVENOUS EVERY 6 HOURS PRN
Status: DISCONTINUED | OUTPATIENT
Start: 2021-09-05 | End: 2021-09-05 | Stop reason: HOSPADM

## 2021-09-05 RX ORDER — ONDANSETRON 4 MG/1
4 TABLET, ORALLY DISINTEGRATING ORAL EVERY 6 HOURS PRN
Status: DISCONTINUED | OUTPATIENT
Start: 2021-09-05 | End: 2021-09-05 | Stop reason: HOSPADM

## 2021-09-05 RX ORDER — PROCHLORPERAZINE 25 MG
25 SUPPOSITORY, RECTAL RECTAL EVERY 12 HOURS PRN
Status: DISCONTINUED | OUTPATIENT
Start: 2021-09-05 | End: 2021-09-05 | Stop reason: HOSPADM

## 2021-09-05 RX ORDER — PROCHLORPERAZINE MALEATE 10 MG
10 TABLET ORAL EVERY 6 HOURS PRN
Status: DISCONTINUED | OUTPATIENT
Start: 2021-09-05 | End: 2021-09-05 | Stop reason: HOSPADM

## 2021-09-05 RX ORDER — METOCLOPRAMIDE 10 MG/1
10 TABLET ORAL EVERY 6 HOURS PRN
Status: DISCONTINUED | OUTPATIENT
Start: 2021-09-05 | End: 2021-09-05 | Stop reason: HOSPADM

## 2021-09-05 NOTE — PROGRESS NOTES
NST reactive and ultrasound here to do BPP.  Score of 8/8.  MD updated.  Patient verbalized understanding of follow-up care on Friday with scheduled BPP and OB appointment.  Ambulatory to hospital exit with staff escort.

## 2021-09-05 NOTE — PROGRESS NOTES
[facility] OB TRIAGE    Amanda Alfred MRN# [id number]   Age: 30 year old YOB: 1990     Date of Assessment:  2021    Primary care provider: Liza Ordoñez           Chief Complaint:   Amanda Alfred is a 30 year old female  who is 34w0d pregnant and being evaluated for follow-up of abnormal biophysical profile.    HPI:  Amanda Alfred is a 30 year old female is a  34w0d being evaluated for: Follow-up of abnormal biophysical profile.  Patient had been in for routine  testing on September 3.  Biophysical profile was 2 out of 8.  [facility].  She had a reactive NST.  Follow-up biophysical profile is 6 out of 8.  She has been doing kick counts at home, these are normal.    Patient with gestational diabetes.          Pregnancy history:     OBSTETRIC HISTORY:    OB History    Para Term  AB Living   2 1 1 0 0 1   SAB TAB Ectopic Multiple Live Births   0 0 0 0 1      # Outcome Date GA Lbr Rob/2nd Weight Sex Delivery Anes PTL Lv   2 Current            1 Term 19 39w3d  3.033 kg (6 lb 11 oz) M CS-LTranv EPI N RADHA      Complications: Fetal Intolerance      Name: CALLUM ALFRED      Apgar1: 8  Apgar5: 9       EDC: Estimated Date of Delivery: Oct 17, 2021    Prenatal Labs:   Lab Results   Component Value Date    ABO O 2021    RH Pos 2021    AS Neg 2021    HEPBANG Nonreactive 2021    HGB 12.2 2021       GBS Status:   No results found for: GBS    Active Problem List  Patient Active Problem List   Diagnosis     Ganglion cyst     Recurrent cold sores     Hypothyroidism, unspecified type     Chronic seasonal allergic rhinitis, unspecified trigger     Encounter for contraceptive management, unspecified type     Pregnancy, unspecified gestational age     Gestational diabetes mellitus (GDM) in third trimester, gestational diabetes method of control unspecified      Encounter for triage in pregnant patient     Pregnancy     History of  section     Melanoma in situ of left lower leg (H)     Abnormal ultrasonic finding on  screening of mother     Diet controlled gestational diabetes mellitus (GDM) in third trimester       Medication Prior to Admission  Medications Prior to Admission   Medication Sig Dispense Refill Last Dose     acyclovir (ZOVIRAX) 800 MG tablet Take 1 tablet (800 mg) by mouth 5 times daily 50 tablet 3      alcohol swab prep pads Use to swab area of injection/martir as directed. 200 each 11      blood glucose (NO BRAND SPECIFIED) lancing device Device to be used with lancets. 1 each 0      blood glucose (NO BRAND SPECIFIED) test strip Use to test blood sugar 4 times daily or as directed. To accompany: Blood Glucose Monitor Brands: AccuChek Guide strips. 100 strip 6      blood glucose calibration (NO BRAND SPECIFIED) solution To accompany: Blood Glucose Monitor Brands: per insurance. 1 each 0      blood glucose monitoring (NO BRAND SPECIFIED) meter device kit Use to test blood sugar 4 times daily or as directed. Preferred blood glucose meter OR supplies to accompany: Blood Glucose Monitor Brands: per insurance. 1 kit 0      fluticasone (FLONASE) 50 MCG/ACT nasal spray Spray 2 sprays into both nostrils daily 18.2 mL 11      folic acid (FOLVITE) 1 MG tablet Take 1 tablet (1 mg) by mouth daily 100 tablet 3      levothyroxine (SYNTHROID/LEVOTHROID) 125 MCG tablet Take 1 tablet (125 mcg) by mouth daily 90 tablet 3      Prenatal Vit-Fe Fumarate-FA (PRENATAL MULTIVITAMIN W/IRON) 27-0.8 MG tablet Take 1 tablet by mouth daily 90 tablet 3      thin (NO BRAND SPECIFIED) lancets Use with lanceting device. To accompany: Blood Glucose Monitor Brands: per insurance. 200 each 6    .        Maternal Past Medical History:     Past Medical History:   Diagnosis Date     Cyst of right ovary     2016     Diet controlled gestational diabetes mellitus (GDM) in  third trimester      Herpesviral vesicular dermatitis     2016     Hypothyroidism     2015     Melanoma of skin (H) 2021                       Family History:   The family history includes Bladder Cancer in her maternal grandfather; Diabetes in her paternal grandfather; Family History Negative in her brother, mother, and paternal grandmother; Kidney Disease in her maternal grandmother; Lupus in her father.    Family history   reviewed            Social History:   I have reviewed this patient's social history         Review of Systems:   No new changes           Physical Exam:   Vitals were reviewed LMP 01/10/2021 (Exact Date)  pending                      Patient just arrived, getting on monitor                    No results found for any visits on 21.          Assessment:   Amanda Mckeon is a  34w0d pregnant female evaluated for follow-up of abnormal  testing  Gestational diabetes  Obesity complicating pregnancy.          Plan:   Biophysical profile and NST today.  Follow-up pending results.    ENEIDA GONZALEZ MD

## 2021-09-05 NOTE — PROGRESS NOTES
Patient and  to [facility] for BPP and NST.  To Room 405.  EUM/EFM applied.  Dr. Dominguez notified of patient's arrival.  NST initiated.

## 2021-09-10 ENCOUNTER — HOSPITAL ENCOUNTER (OUTPATIENT)
Dept: ULTRASOUND IMAGING | Facility: OTHER | Age: 31
End: 2021-09-10
Attending: FAMILY MEDICINE
Payer: COMMERCIAL

## 2021-09-10 ENCOUNTER — PRENATAL OFFICE VISIT (OUTPATIENT)
Dept: FAMILY MEDICINE | Facility: OTHER | Age: 31
End: 2021-09-10
Attending: FAMILY MEDICINE
Payer: COMMERCIAL

## 2021-09-10 VITALS
BODY MASS INDEX: 34.55 KG/M2 | TEMPERATURE: 98.8 F | OXYGEN SATURATION: 98 % | DIASTOLIC BLOOD PRESSURE: 74 MMHG | WEIGHT: 207.4 LBS | HEART RATE: 88 BPM | SYSTOLIC BLOOD PRESSURE: 120 MMHG | HEIGHT: 65 IN | RESPIRATION RATE: 20 BRPM

## 2021-09-10 DIAGNOSIS — Z3A.34 34 WEEKS GESTATION OF PREGNANCY: Primary | ICD-10-CM

## 2021-09-10 DIAGNOSIS — Z34.83 PRENATAL CARE, SUBSEQUENT PREGNANCY IN THIRD TRIMESTER: ICD-10-CM

## 2021-09-10 DIAGNOSIS — O24.419 GESTATIONAL DIABETES MELLITUS (GDM) IN THIRD TRIMESTER, GESTATIONAL DIABETES METHOD OF CONTROL UNSPECIFIED: ICD-10-CM

## 2021-09-10 DIAGNOSIS — E03.9 HYPOTHYROIDISM, UNSPECIFIED TYPE: ICD-10-CM

## 2021-09-10 PROCEDURE — 76819 FETAL BIOPHYS PROFIL W/O NST: CPT

## 2021-09-10 PROCEDURE — 99207 PR OB VISIT-NO CHARGE - GICH ONLY: CPT | Performed by: FAMILY MEDICINE

## 2021-09-10 ASSESSMENT — PAIN SCALES - GENERAL: PAINLEVEL: NO PAIN (0)

## 2021-09-10 ASSESSMENT — MIFFLIN-ST. JEOR: SCORE: 1648.7

## 2021-09-10 NOTE — PROGRESS NOTES
Biophysical Profile  today with no breathing movements noted.  Still is feeling fetal movement.  No regular contractions, but intermittent BH contractions.  No bleeding or fluid leaking.  Fastin-105 (only 2/13 readings over 95)  2 hours after breakfast:   (1/14 were over 120)  2 hours after lunch:   (all in range).  2 hours after supper:   (3/13 were over 120)    (Z3A.34) 34 weeks gestation of pregnancy  (primary encounter diagnosis)  Comment:   Plan: US OB >14 Weeks Follow Up        Continue to follow up weekly with Biophysical Profiles, with me in 2 weeks.  Discussed kick counts.  Follow up if decreased fetal movement or any other concerns.  Add growth ultrasound at 36 week Biophysical Profile.    (O24.419) Gestational diabetes mellitus (GDM) in third trimester, gestational diabetes method of control unspecified  Comment: glucose readings continue to be in goal range.  Plan: US OB >14 Weeks Follow Up        Continue with dietary management.    (E03.9) Hypothyroidism, unspecified type  Comment: stable.  Last TSH was 2 weeks ago.  Plan: plan to repeat TSH again in 2 weeks.    Liza Ordoñez MD on 9/10/2021 at 6:28 PM

## 2021-09-10 NOTE — NURSING NOTE
Patient presents today for routine ob check up 34w 5d.    Medication Reconciliation Complete    Bella Chaudhari LPN  9/10/2021 4:09 PM

## 2021-09-17 ENCOUNTER — HOSPITAL ENCOUNTER (OUTPATIENT)
Dept: ULTRASOUND IMAGING | Facility: OTHER | Age: 31
Discharge: HOME | End: 2021-09-17
Attending: FAMILY MEDICINE | Admitting: FAMILY MEDICINE
Payer: COMMERCIAL

## 2021-09-17 DIAGNOSIS — Z34.83 PRENATAL CARE, SUBSEQUENT PREGNANCY IN THIRD TRIMESTER: ICD-10-CM

## 2021-09-17 DIAGNOSIS — O24.419 GESTATIONAL DIABETES MELLITUS (GDM) IN THIRD TRIMESTER, GESTATIONAL DIABETES METHOD OF CONTROL UNSPECIFIED: ICD-10-CM

## 2021-09-17 PROCEDURE — 76816 OB US FOLLOW-UP PER FETUS: CPT

## 2021-09-17 PROCEDURE — 76819 FETAL BIOPHYS PROFIL W/O NST: CPT

## 2021-09-24 ENCOUNTER — HOSPITAL ENCOUNTER (OUTPATIENT)
Dept: ULTRASOUND IMAGING | Facility: OTHER | Age: 31
End: 2021-09-24
Attending: FAMILY MEDICINE
Payer: COMMERCIAL

## 2021-09-24 ENCOUNTER — PRENATAL OFFICE VISIT (OUTPATIENT)
Dept: FAMILY MEDICINE | Facility: OTHER | Age: 31
End: 2021-09-24
Attending: FAMILY MEDICINE
Payer: COMMERCIAL

## 2021-09-24 VITALS
BODY MASS INDEX: 34.66 KG/M2 | SYSTOLIC BLOOD PRESSURE: 138 MMHG | HEART RATE: 89 BPM | WEIGHT: 208 LBS | HEIGHT: 65 IN | RESPIRATION RATE: 14 BRPM | DIASTOLIC BLOOD PRESSURE: 68 MMHG | TEMPERATURE: 97.5 F | OXYGEN SATURATION: 98 %

## 2021-09-24 DIAGNOSIS — O24.419 GESTATIONAL DIABETES MELLITUS (GDM) IN THIRD TRIMESTER, GESTATIONAL DIABETES METHOD OF CONTROL UNSPECIFIED: ICD-10-CM

## 2021-09-24 DIAGNOSIS — E03.9 HYPOTHYROIDISM, UNSPECIFIED TYPE: ICD-10-CM

## 2021-09-24 DIAGNOSIS — Z34.83 PRENATAL CARE, SUBSEQUENT PREGNANCY IN THIRD TRIMESTER: ICD-10-CM

## 2021-09-24 DIAGNOSIS — Z34.83 PRENATAL CARE, SUBSEQUENT PREGNANCY IN THIRD TRIMESTER: Primary | ICD-10-CM

## 2021-09-24 LAB
HGB BLD-MCNC: 12.9 G/DL (ref 11.7–15.7)
TSH SERPL DL<=0.005 MIU/L-ACNC: 0.67 MU/L (ref 0.4–4)

## 2021-09-24 PROCEDURE — 99207 PR OB VISIT-NO CHARGE - GICH ONLY: CPT | Performed by: FAMILY MEDICINE

## 2021-09-24 PROCEDURE — 87081 CULTURE SCREEN ONLY: CPT | Mod: ZL | Performed by: FAMILY MEDICINE

## 2021-09-24 PROCEDURE — 84443 ASSAY THYROID STIM HORMONE: CPT | Mod: ZL | Performed by: FAMILY MEDICINE

## 2021-09-24 PROCEDURE — 36415 COLL VENOUS BLD VENIPUNCTURE: CPT | Mod: ZL | Performed by: FAMILY MEDICINE

## 2021-09-24 PROCEDURE — 85018 HEMOGLOBIN: CPT | Mod: ZL | Performed by: FAMILY MEDICINE

## 2021-09-24 PROCEDURE — 76819 FETAL BIOPHYS PROFIL W/O NST: CPT

## 2021-09-24 ASSESSMENT — PATIENT HEALTH QUESTIONNAIRE - PHQ9
5. POOR APPETITE OR OVEREATING: NOT AT ALL
SUM OF ALL RESPONSES TO PHQ QUESTIONS 1-9: 0

## 2021-09-24 ASSESSMENT — ANXIETY QUESTIONNAIRES

## 2021-09-24 ASSESSMENT — MIFFLIN-ST. JEOR: SCORE: 1651.42

## 2021-09-24 ASSESSMENT — PAIN SCALES - GENERAL: PAINLEVEL: NO PAIN (0)

## 2021-09-24 NOTE — PROGRESS NOTES
Biophysical Profile today  (no points for breathing movements).  Amanda notes that breathing movements were seen, but just not for 30 continuous seconds.  Baby has been very active.  No bleeding or fluid leaking.  Has had intermittent contractions, but no pattern and continue to be irregular.    Fastin-99 (2 in past 2 weeks were 95+)  2 hours after breakfast:   (only 1 over 120)  2 hours after lunch:   2 hours after supper:103-166 (5 over 120)    (Z34.83) Prenatal care, subsequent pregnancy in third trimester  (primary encounter diagnosis)  Comment: GBS and hemoglobin obtained today.  Plan: Rectal/Vag Group B Strep Culture, Hemoglobin        Follow up weekly until delivery.  Her  is scheduled for 10/11/2021 with Dr. Flower.    (O24.419) Gestational diabetes mellitus (GDM) in third trimester, gestational diabetes method of control unspecified  Comment: stable.  Blood sugars noted as above.   Plan: continue with dietary management.     (E03.9) Hypothyroidism, unspecified type  Comment: stable.    Plan: TSH Reflex GH        TSH today as above.    Liza Ordoñez MD

## 2021-09-24 NOTE — NURSING NOTE
Patient presents to clinic for 36 week OB check.  Carly Hopper LPN ....................  9/24/2021   4:06 PM

## 2021-09-25 ASSESSMENT — ANXIETY QUESTIONNAIRES: GAD7 TOTAL SCORE: 0

## 2021-09-27 LAB — BACTERIA SPEC CULT: NORMAL

## 2021-10-01 ENCOUNTER — HOSPITAL ENCOUNTER (OUTPATIENT)
Dept: ULTRASOUND IMAGING | Facility: OTHER | Age: 31
End: 2021-10-01
Attending: FAMILY MEDICINE
Payer: COMMERCIAL

## 2021-10-01 ENCOUNTER — PRENATAL OFFICE VISIT (OUTPATIENT)
Dept: FAMILY MEDICINE | Facility: OTHER | Age: 31
End: 2021-10-01
Attending: FAMILY MEDICINE
Payer: COMMERCIAL

## 2021-10-01 VITALS
TEMPERATURE: 97.5 F | DIASTOLIC BLOOD PRESSURE: 82 MMHG | HEART RATE: 78 BPM | BODY MASS INDEX: 34.31 KG/M2 | OXYGEN SATURATION: 98 % | RESPIRATION RATE: 24 BRPM | WEIGHT: 203 LBS | SYSTOLIC BLOOD PRESSURE: 124 MMHG

## 2021-10-01 DIAGNOSIS — Z34.83 PRENATAL CARE, SUBSEQUENT PREGNANCY IN THIRD TRIMESTER: ICD-10-CM

## 2021-10-01 DIAGNOSIS — O24.419 GESTATIONAL DIABETES MELLITUS (GDM) IN THIRD TRIMESTER, GESTATIONAL DIABETES METHOD OF CONTROL UNSPECIFIED: ICD-10-CM

## 2021-10-01 DIAGNOSIS — Z34.83 PRENATAL CARE, SUBSEQUENT PREGNANCY IN THIRD TRIMESTER: Primary | ICD-10-CM

## 2021-10-01 PROCEDURE — 76819 FETAL BIOPHYS PROFIL W/O NST: CPT

## 2021-10-01 PROCEDURE — 99207 PR OB VISIT-NO CHARGE - GICH ONLY: CPT | Performed by: FAMILY MEDICINE

## 2021-10-01 PROCEDURE — 59025 FETAL NON-STRESS TEST: CPT | Performed by: FAMILY MEDICINE

## 2021-10-01 ASSESSMENT — PAIN SCALES - GENERAL: PAINLEVEL: NO PAIN (0)

## 2021-10-01 NOTE — PROGRESS NOTES
No bleeding, fluid leaking.  Baby has still been active.  Has had more contractions, but no pattern.  Biophysical Profile today was  with points lost for fetal movements.  Some movements were seen, but not enough to qualify for points.  GIOVANNA today was 7.3.  Fastin-98 (1 was over 95)  2 hours after breakfast:   (1/5 was over 120)  2 hours after lunch:  2 hours after supper: 104-164 (2/6 were over 120)    Due to Biophysical Profile of  today, NST was also completed with results as noted below:    NST  Gestational age:  37   Reason for NST:  See above  Time monitored: 23 minutes (start time 4:22, stop time 4:45)  Baseline FHR:  130  FHR Accelerations:  present  FHR Decelerations:  none, pattern:  n/a  Frequency and intensity of contractions:  No contractions present  Liza Ordoñez MD     With her lower GIOVANNA today, recommended increasing fluid intake.  Of note, during her NST, fetal movement could be heard.  Mother felt 18 fetal movements during monitoring.  Recommended that she follow up again on Monday,  and will repeat Biophysical Profile again at that time.  Discussed monitoring fetal movements in the meantime and following up if any concerns.    Liza Ordoñez MD

## 2021-10-01 NOTE — NURSING NOTE
Chief Complaint   Patient presents with     Prenatal Care     37w5d     Offers no complaints  Shahnaz Andrews LPN........................10/1/2021  4:11 PM     Medication Reconciliation: completed   Shahnaz Andrews LPN  10/1/2021 4:11 PM

## 2021-10-04 ENCOUNTER — HOSPITAL ENCOUNTER (OUTPATIENT)
Dept: ULTRASOUND IMAGING | Facility: OTHER | Age: 31
End: 2021-10-04
Attending: FAMILY MEDICINE
Payer: COMMERCIAL

## 2021-10-04 ENCOUNTER — PRENATAL OFFICE VISIT (OUTPATIENT)
Dept: FAMILY MEDICINE | Facility: OTHER | Age: 31
End: 2021-10-04
Attending: FAMILY MEDICINE
Payer: COMMERCIAL

## 2021-10-04 VITALS
HEART RATE: 80 BPM | BODY MASS INDEX: 34.81 KG/M2 | WEIGHT: 206 LBS | SYSTOLIC BLOOD PRESSURE: 124 MMHG | OXYGEN SATURATION: 96 % | DIASTOLIC BLOOD PRESSURE: 74 MMHG | RESPIRATION RATE: 18 BRPM | TEMPERATURE: 98.9 F

## 2021-10-04 DIAGNOSIS — O24.419 GESTATIONAL DIABETES MELLITUS (GDM) IN THIRD TRIMESTER, GESTATIONAL DIABETES METHOD OF CONTROL UNSPECIFIED: ICD-10-CM

## 2021-10-04 DIAGNOSIS — Z34.83 PRENATAL CARE, SUBSEQUENT PREGNANCY IN THIRD TRIMESTER: Primary | ICD-10-CM

## 2021-10-04 PROCEDURE — 76819 FETAL BIOPHYS PROFIL W/O NST: CPT

## 2021-10-04 PROCEDURE — 99207 PR OB VISIT-NO CHARGE - GICH ONLY: CPT | Performed by: FAMILY MEDICINE

## 2021-10-04 ASSESSMENT — PAIN SCALES - GENERAL: PAINLEVEL: NO PAIN (0)

## 2021-10-04 NOTE — NURSING NOTE
"Chief Complaint   Patient presents with     Prenatal Care     Here for her 38 week OB check up.     Initial LMP 01/10/2021 (Exact Date)  Estimated body mass index is 34.31 kg/m  as calculated from the following:    Height as of 9/24/21: 1.638 m (5' 4.5\").    Weight as of 10/1/21: 92.1 kg (203 lb).     FOOD SECURITY SCREENING QUESTIONS  Hunger Vital Signs:  Within the past 12 months we worried whether our food would run out before we got money to buy more. Never  Within the past 12 months the food we bought just didn't last and we didn't have money to get more. Never      Medication Reconciliation: Complete      Inderjit Mccrary LPN   "

## 2021-10-04 NOTE — PROGRESS NOTES
No regular contractions.  No bleeding or fluid leaking.  Baby is still active.  Biophysical Profile today was 8/8.  Declines cervical exam.  Follow up later this week on 10/7/2021 for next visit.  Liza Ordoñez MD

## 2021-10-07 ENCOUNTER — PRENATAL OFFICE VISIT (OUTPATIENT)
Dept: FAMILY MEDICINE | Facility: OTHER | Age: 31
End: 2021-10-07
Attending: FAMILY MEDICINE
Payer: COMMERCIAL

## 2021-10-07 ENCOUNTER — HOSPITAL ENCOUNTER (OUTPATIENT)
Dept: ULTRASOUND IMAGING | Facility: OTHER | Age: 31
End: 2021-10-07
Attending: FAMILY MEDICINE
Payer: COMMERCIAL

## 2021-10-07 ENCOUNTER — HOSPITAL ENCOUNTER (OUTPATIENT)
Facility: OTHER | Age: 31
LOS: 1 days | Discharge: HOME | End: 2021-10-07
Attending: OBSTETRICS & GYNECOLOGY | Admitting: FAMILY MEDICINE
Payer: COMMERCIAL

## 2021-10-07 ENCOUNTER — PRENATAL OFFICE VISIT (OUTPATIENT)
Dept: OBGYN | Facility: OTHER | Age: 31
End: 2021-10-07
Attending: OBSTETRICS & GYNECOLOGY
Payer: COMMERCIAL

## 2021-10-07 VITALS
OXYGEN SATURATION: 98 % | DIASTOLIC BLOOD PRESSURE: 74 MMHG | SYSTOLIC BLOOD PRESSURE: 130 MMHG | RESPIRATION RATE: 16 BRPM | HEART RATE: 70 BPM | WEIGHT: 206 LBS | BODY MASS INDEX: 34.81 KG/M2

## 2021-10-07 VITALS
DIASTOLIC BLOOD PRESSURE: 55 MMHG | SYSTOLIC BLOOD PRESSURE: 110 MMHG | OXYGEN SATURATION: 97 % | RESPIRATION RATE: 16 BRPM

## 2021-10-07 VITALS
DIASTOLIC BLOOD PRESSURE: 74 MMHG | OXYGEN SATURATION: 98 % | HEART RATE: 70 BPM | RESPIRATION RATE: 16 BRPM | SYSTOLIC BLOOD PRESSURE: 130 MMHG

## 2021-10-07 DIAGNOSIS — E03.9 HYPOTHYROIDISM, UNSPECIFIED TYPE: ICD-10-CM

## 2021-10-07 DIAGNOSIS — Z34.83 PRENATAL CARE, SUBSEQUENT PREGNANCY IN THIRD TRIMESTER: ICD-10-CM

## 2021-10-07 DIAGNOSIS — O24.419 GESTATIONAL DIABETES MELLITUS (GDM) IN THIRD TRIMESTER, GESTATIONAL DIABETES METHOD OF CONTROL UNSPECIFIED: ICD-10-CM

## 2021-10-07 DIAGNOSIS — Z01.812 PRE-PROCEDURE LAB EXAM: Primary | ICD-10-CM

## 2021-10-07 DIAGNOSIS — Z34.83 PRENATAL CARE, SUBSEQUENT PREGNANCY IN THIRD TRIMESTER: Primary | ICD-10-CM

## 2021-10-07 PROCEDURE — C9803 HOPD COVID-19 SPEC COLLECT: HCPCS | Performed by: OBSTETRICS & GYNECOLOGY

## 2021-10-07 PROCEDURE — 99207 PR OB VISIT-NO CHARGE - GICH ONLY: CPT | Performed by: FAMILY MEDICINE

## 2021-10-07 PROCEDURE — 76819 FETAL BIOPHYS PROFIL W/O NST: CPT

## 2021-10-07 PROCEDURE — 99207 PR OB VISIT-NO CHARGE - GICH ONLY: CPT | Performed by: OBSTETRICS & GYNECOLOGY

## 2021-10-07 PROCEDURE — G0463 HOSPITAL OUTPT CLINIC VISIT: HCPCS | Mod: 25

## 2021-10-07 PROCEDURE — U0005 INFEC AGEN DETEC AMPLI PROBE: HCPCS | Mod: ZL | Performed by: OBSTETRICS & GYNECOLOGY

## 2021-10-07 RX ORDER — LIDOCAINE 40 MG/G
CREAM TOPICAL
Status: DISCONTINUED | OUTPATIENT
Start: 2021-10-07 | End: 2021-10-07 | Stop reason: CLARIF

## 2021-10-07 ASSESSMENT — ANXIETY QUESTIONNAIRES
GAD7 TOTAL SCORE: 0
5. BEING SO RESTLESS THAT IT IS HARD TO SIT STILL: NOT AT ALL
4. TROUBLE RELAXING: NOT AT ALL
3. WORRYING TOO MUCH ABOUT DIFFERENT THINGS: NOT AT ALL
6. BECOMING EASILY ANNOYED OR IRRITABLE: NOT AT ALL
2. NOT BEING ABLE TO STOP OR CONTROL WORRYING: NOT AT ALL
GAD7 TOTAL SCORE: 0
1. FEELING NERVOUS, ANXIOUS, OR ON EDGE: NOT AT ALL
8. IF YOU CHECKED OFF ANY PROBLEMS, HOW DIFFICULT HAVE THESE MADE IT FOR YOU TO DO YOUR WORK, TAKE CARE OF THINGS AT HOME, OR GET ALONG WITH OTHER PEOPLE?: NOT DIFFICULT AT ALL
GAD7 TOTAL SCORE: 0
7. FEELING AFRAID AS IF SOMETHING AWFUL MIGHT HAPPEN: NOT AT ALL
7. FEELING AFRAID AS IF SOMETHING AWFUL MIGHT HAPPEN: NOT AT ALL

## 2021-10-07 ASSESSMENT — PATIENT HEALTH QUESTIONNAIRE - PHQ9
SUM OF ALL RESPONSES TO PHQ QUESTIONS 1-9: 0
10. IF YOU CHECKED OFF ANY PROBLEMS, HOW DIFFICULT HAVE THESE PROBLEMS MADE IT FOR YOU TO DO YOUR WORK, TAKE CARE OF THINGS AT HOME, OR GET ALONG WITH OTHER PEOPLE: NOT DIFFICULT AT ALL
SUM OF ALL RESPONSES TO PHQ QUESTIONS 1-9: 0

## 2021-10-07 ASSESSMENT — PAIN SCALES - GENERAL
PAINLEVEL: NO PAIN (0)
PAINLEVEL: NO PAIN (0)

## 2021-10-07 NOTE — PROGRESS NOTES
CC: Recheck OB visit at 38w4d    HPI: Amanda Alfred presents for a routine OB visit now at 38w4d  Concerns:   Patient notices normal fetal movement, denies contractions, vaginal bleeding or leaking of fluid.    OB History    Para Term  AB Living   2 1 1 0 0 1   SAB TAB Ectopic Multiple Live Births   0 0 0 0 1      # Outcome Date GA Lbr Rob/2nd Weight Sex Delivery Anes PTL Lv   2 Current            1 Term 19 39w3d  3.033 kg (6 lb 11 oz) M CS-LTranv EPI N RADHA      Complications: Fetal Intolerance      Name: HOLLI ALFRED-AMANDA      Apgar1: 8  Apgar5: 9     Current Outpatient Medications   Medication     acyclovir (ZOVIRAX) 800 MG tablet     alcohol swab prep pads     blood glucose (NO BRAND SPECIFIED) lancing device     blood glucose (NO BRAND SPECIFIED) test strip     blood glucose calibration (NO BRAND SPECIFIED) solution     blood glucose monitoring (NO BRAND SPECIFIED) meter device kit     fluticasone (FLONASE) 50 MCG/ACT nasal spray     folic acid (FOLVITE) 1 MG tablet     levothyroxine (SYNTHROID/LEVOTHROID) 125 MCG tablet     Prenatal Vit-Fe Fumarate-FA (PRENATAL MULTIVITAMIN W/IRON) 27-0.8 MG tablet     thin (NO BRAND SPECIFIED) lancets     No current facility-administered medications for this visit.         O: LMP 01/10/2021 (Exact Date)   There is no height or weight on file to calculate BMI.  See OB flow sheet  EXAM:  NAD    NST doucmentation:    Indication: (Z01.812) Pre-procedure lab exam  (primary encounter diagnosis)  Comment:   Plan: Asymptomatic COVID-19 Virus (Coronavirus) by         PCR Nose          Duration: 30 min     38w4d  FHR baseline: 110 with minimal variability  Accelerations: n  Decelerations: n  Contractions: n    Category 2    No results found for any visits on 10/07/21.    A/P: 38w4d    BPP 8/8, but low normal fluid  NST cat 2  Send to [facility] for prolonged monitoring until reactive   Monday if reactive, reassuring, tonight if not.    Omar Flower MD  FACOG  3:07 PM 10/7/2021

## 2021-10-07 NOTE — PROGRESS NOTES
"Chief Complaint   Patient presents with     Prenatal Care     38 weeks 4 days     Patient has no concerns today.    Initial /74 (BP Location: Right arm)   Pulse 70   Resp 16   LMP 01/10/2021 (Exact Date)   SpO2 98%  Estimated body mass index is 34.81 kg/m  as calculated from the following:    Height as of 9/24/21: 1.638 m (5' 4.5\").    Weight as of 10/4/21: 93.4 kg (206 lb).  Medication Reconciliation: complete    Diana Aviles RN      "

## 2021-10-08 ENCOUNTER — ANESTHESIA EVENT (OUTPATIENT)
Dept: SURGERY | Facility: OTHER | Age: 31
End: 2021-10-08
Payer: COMMERCIAL

## 2021-10-08 ASSESSMENT — PATIENT HEALTH QUESTIONNAIRE - PHQ9: SUM OF ALL RESPONSES TO PHQ QUESTIONS 1-9: 0

## 2021-10-08 ASSESSMENT — ANXIETY QUESTIONNAIRES: GAD7 TOTAL SCORE: 0

## 2021-10-08 NOTE — PROGRESS NOTES
[facility] fetal [facility] after clinic appt.     EFM applied, FHR difficult to monitor, but baby audibly in 115's. Dr Flower at bedside assisting with EFM U/S.     VSS. Will continue to monitor.

## 2021-10-08 NOTE — PROGRESS NOTES
Feeling well today.  No bleeding, fluid leaking.  Baby is still active.  Biophysical Profile today was .  Saw Omar Flower MD just before our visit together today and had a non-reactive NST.  She will be sent to [facility] for further monitoring.  If strip continues to be non-reactive, would proceed with  today.  If becomes favorable, would keep plans for delivery on 10/11/2021.  Her blood sugars remain stable.    Liza Ordoñez MD

## 2021-10-08 NOTE — PROGRESS NOTES
Category 1 strip noted. Dr. Flower on unit reviewing strip. Dr. Ching jensen with pt discharging home and returning for scheduled C section on Monday 10/11. Pt discharged from [facility] at 1750. Unable to remove from epic system until 1945.

## 2021-10-09 ENCOUNTER — TELEPHONE (OUTPATIENT)
Dept: OBGYN | Facility: OTHER | Age: 31
End: 2021-10-09

## 2021-10-09 ENCOUNTER — HEALTH MAINTENANCE LETTER (OUTPATIENT)
Age: 31
End: 2021-10-09

## 2021-10-09 ENCOUNTER — MYC MEDICAL ADVICE (OUTPATIENT)
Dept: FAMILY MEDICINE | Facility: OTHER | Age: 31
End: 2021-10-09

## 2021-10-09 LAB — SARS-COV-2 RNA RESP QL NAA+PROBE: POSITIVE

## 2021-10-09 NOTE — TELEPHONE ENCOUNTER
"Coronavirus (COVID-19) Notification    Caller Name (Patient, parent, daughter/son, grandparent, etc)  patient    Reason for call  Notify of Positive Coronavirus (COVID-19) lab results, assess symptoms,  review  [employer] recommendations    Lab Result    Lab test:  2019-nCoV rRt-PCR or SARS-CoV-2 PCR    Oropharyngeal AND/OR nasopharyngeal swabs is POSITIVE for 2019-nCoV RNA/SARS-COV-2 PCR (COVID-19 virus)    RN Recommendations/Instructions per [facility] Coronavirus COVID-19 recommendations    Brief introduction script  Introduce self then review script:  \"I am calling on behalf of [employer].  We were notified that your Coronavirus test (COVID-19) for was POSITIVE for the virus.  I have some information to relay to you but first I wanted to mention that the [place] Dept of Health will be contacting you shortly [it's possible [facility] already called Patient] to talk to you more about how you are feeling and other people you have had contact with who might now also have the virus.  Also,  [email or web address] is Partnering with the [facility] for Covid-19 research, you may be contacted directly by research staff.\"    Patient is currently 38 weeks pregnant. Informed patient to contact her OB doctor for further instructions. Patient agrees and has no further questions @ this time.    Assessment (Inquire about Patient's current symptoms)   Assessment   Current Symptoms at time of phone call: (if no symptoms, document No symptoms] No symptoms   Symptoms onset (if applicable) n/a     If at time of call, Patients symptoms hare worsened, the Patient should contact 911 or have someone drive them to Emergency Dept promptly:      If Patient calling 911, inform 911 personal that you have tested positive for the Coronavirus (COVID-19).  Place mask on and await 911 to arrive.    If Emergency Dept, If possible, please have another adult drive you to the Emergency Dept but you need to wear mask when in contact with other " "people.      Monoclonal Antibody Administration    You may be eligible to receive a new treatment with a monoclonal antibody for preventing hospitalization in patients at high risk for complications from COVID-19.   This medication is still experimental and available on a limited basis; it is given through an IV and must be given at an infusion center. Please note that not all people who are eligible will receive the medication since it is in limited supply.     Are you interested in being considered for this medication?  No.   Does the patient fit the criteria: Patient declined    If patient qualifies based on above criteria:  \"You will be contacted if you are selected to receive this treatment in the next 1-2 business days.   This is time sensitive and if you are not selected in the next 1-2 business days, you will not receive the medication.  If you do not receive a call to schedule, you have not been selected.\"      Review information with Patient    Your result was positive. This means you have COVID-19 (coronavirus).  We have sent you a letter that reviews the information that I'll be reviewing with you now.    How can I protect others?    If you have symptoms: stay home and away from others (self-isolate) until:    You've had no fever--and no medicine that reduces fever--for 1 full day (24 hours). And       Your other symptoms have gotten better. For example, your cough or breathing has improved. And     At least 10 days have passed since your symptoms started. (If you've been told by a doctor that you have a weak immune system, wait 20 days.)     If you don't have symptoms: Stay home and away from others (self-isolate) until at least 10 days have passed since your first positive COVID-19 test. (Date test collected)    During this time:    Stay in your own room, including for meals. Use your own bathroom if you can.    Stay away from others in your home. No hugging, kissing or shaking hands. No visitors. "     Don't go to work, school or anywhere else.     Clean  high touch  surfaces often (doorknobs, counters, handles, etc.). Use a household cleaning spray or wipes. You'll find a full list on the EPA website at www.epa.gov/pesticide-registration/list-n-disinfectants-use-against-sars-cov-2.     Cover your mouth and nose with a mask, tissue or other face covering to avoid spreading germs.    Wash your hands and face often with soap and water.    Make a list of people you have been in close contact with recently, even if either of you wore a face covering.   ; Start your list from 2 days before you became ill or had a positive test.  ; Include anyone that was within 6 feet of you for a cumulative total of 15 minutes or more in 24 hours. (Example: if you sat next to Aman for 5 minutes in the morning and 10 minutes in the afternoon, then you were in close contact for 15 minutes total that day. Aman would be added to your list.)    A public health worker will call or text you. It is important that you answer. They will ask you questions about possible exposures to COVID-19, such as people you have been in direct contact with and places you have visited.    Tell the people on your list that you have COVID-19; they should stay away from others for 14 days starting from the last time they were in contact with you (unless you are told something different from a public health worker).     Caregivers in these groups are at risk for severe illness due to COVID-19:  o People 65 years and older  o People who live in a nursing home or long-term care facility  o People with chronic disease (lung, heart, cancer, diabetes, kidney, liver, immunologic)  o People who have a weakened immune system, including those who:  - Are in cancer treatment  - Take medicine that weakens the immune system, such as corticosteroids  - Had a bone marrow or organ transplant  - Have an immune deficiency  - Have poorly controlled HIV or AIDS  - Are obese  (body mass index of 40 or higher)  - Smoke regularly    Caregivers should wear gloves while washing dishes, handling laundry and cleaning bedrooms and bathrooms.    Wash and dry laundry with special caution. Don't shake dirty laundry, and use the warmest water setting you can.    If you have a weakened immune system, ask your doctor about other actions you should take.    For more tips, go to www.cdc.gov/coronavirus/2019-ncov/downloads/10Things.pdf.    You should not go back to work until you meet the guidelines above for ending your home isolation. You don't need to be retested for COVID-19 before going back to work--studies show that you won't spread the virus if it's been at least 10 days since your symptoms started (or 20 days, if you have a weak immune system).    Employers: This document serves as formal notice of your employee's medical guidelines for going back to work. They must meet the above guidelines before going back to work in person.    How can I take care of myself?    1. Get lots of rest. Drink extra fluids (unless a doctor has told you not to).    2. Take Tylenol (acetaminophen) for fever or pain. If you have liver or kidney problems, ask your family doctor if it's okay to take Tylenol.     Take either:     650 mg (two 325 mg pills) every 4 to 6 hours, or     1,000 mg (two 500 mg pills) every 8 hours as needed.     Note: Don't take more than 3,000 mg in one day. Acetaminophen is found in many medicines (both prescribed and over-the-counter medicines). Read all labels to be sure you don't take too much.    For children, check the Tylenol bottle for the right dose (based on their age or weight).    3. If you have other health problems (like cancer, heart failure, an organ transplant or severe kidney disease): Call your specialty clinic if you don't feel better in the next 2 days.    4. Know when to call 911: Emergency warning signs include:    Trouble breathing or shortness of breath    Pain or  pressure in the chest that doesn't go away    Feeling confused like you haven't felt before, or not being able to wake up    Bluish-colored lips or face    5. Sign up for [employer]To. We know it's scary to hear that you have COVID-19. We want to track your symptoms to make sure you're okay over the next 2 weeks. Please look for an email from UpTo--this is a free, online program that we'll use to keep in touch. To sign up, follow the link in the email. Learn more at [email or web address].    Where can I get more information?    [facility]: [email or web address]    Coronavirus Basics: www.health.[facility].[place]/diseases/coronavirus/basics.html    What to Do If You're Sick: www.cdc.gov/coronavirus/2019-ncov/about/steps-when-sick.html    Ending Home Isolation: www.cdc.gov/coronavirus/2019-ncov/hcp/disposition-in-home-patients.html     Caring for Someone with COVID-19: www.cdc.gov/coronavirus/2019-ncov/if-you-are-sick/care-for-someone.html     [facility] clinical trials (COVID-19 research studies): clinicalaffairs.[facility]/n-clinical-trials     A Positive COVID-19 letter will be sent via [employer] or the mail. (Exception, no letters sent to Presurgerical/Preprocedure Patients)    Vicki Ramirez LPN

## 2021-10-09 NOTE — TELEPHONE ENCOUNTER
PRE-SURG    Coronavirus (COVID-19) Notification    Reason for call  Notify of POSITIVE  COVID-19 lab result, assess symptoms,  review [facility] recommendations    Lab Result   Lab test for 2019-nCoV rRt-PCR or SARS-COV-2 PCR  Oropharyngeal AND/OR nasopharyngeal swabs were POSITIVE for 2019-nCoV RNA [OR] SARS-COV-2 RNA (COVID-19) RNA     We have been unable to reach Patient by phone at this time to notify of their Positive COVID-19 result.  Left voicemail message requesting a call back to [phone] [facility] for results.        POSITIVE COVID-19 Letter sent.    Vicki Ramirez LPN

## 2021-10-11 ENCOUNTER — ANESTHESIA (OUTPATIENT)
Dept: SURGERY | Facility: OTHER | Age: 31
End: 2021-10-11
Payer: COMMERCIAL

## 2021-10-11 ENCOUNTER — HOSPITAL ENCOUNTER (INPATIENT)
Facility: OTHER | Age: 31
LOS: 2 days | Discharge: HOME | End: 2021-10-13
Attending: OBSTETRICS & GYNECOLOGY | Admitting: OBSTETRICS & GYNECOLOGY
Payer: COMMERCIAL

## 2021-10-11 ENCOUNTER — SURGERY (OUTPATIENT)
Age: 31
End: 2021-10-11
Payer: COMMERCIAL

## 2021-10-11 DIAGNOSIS — Z98.891 HISTORY OF C-SECTION: ICD-10-CM

## 2021-10-11 DIAGNOSIS — G89.18 POSTOPERATIVE PAIN: Primary | ICD-10-CM

## 2021-10-11 LAB
ABO/RH(D): NORMAL
ANTIBODY SCREEN: NEGATIVE
BASOPHILS # BLD AUTO: 0.1 10E3/UL (ref 0–0.2)
BASOPHILS NFR BLD AUTO: 1 %
EOSINOPHIL # BLD AUTO: 0.1 10E3/UL (ref 0–0.7)
EOSINOPHIL NFR BLD AUTO: 1 %
ERYTHROCYTE [DISTWIDTH] IN BLOOD BY AUTOMATED COUNT: 12.9 % (ref 10–15)
HCT VFR BLD AUTO: 37.7 % (ref 35–47)
HGB BLD-MCNC: 12.8 G/DL (ref 11.7–15.7)
IMM GRANULOCYTES # BLD: 0.1 10E3/UL
IMM GRANULOCYTES NFR BLD: 2 %
LYMPHOCYTES # BLD AUTO: 1.6 10E3/UL (ref 0.8–5.3)
LYMPHOCYTES NFR BLD AUTO: 21 %
MCH RBC QN AUTO: 29.7 PG (ref 26.5–33)
MCHC RBC AUTO-ENTMCNC: 34 G/DL (ref 31.5–36.5)
MCV RBC AUTO: 88 FL (ref 78–100)
MONOCYTES # BLD AUTO: 0.7 10E3/UL (ref 0–1.3)
MONOCYTES NFR BLD AUTO: 8 %
NEUTROPHILS # BLD AUTO: 5.4 10E3/UL (ref 1.6–8.3)
NEUTROPHILS NFR BLD AUTO: 67 %
NRBC # BLD AUTO: 0 10E3/UL
NRBC BLD AUTO-RTO: 0 /100
PLATELET # BLD AUTO: 265 10E3/UL (ref 150–450)
RBC # BLD AUTO: 4.31 10E6/UL (ref 3.8–5.2)
SPECIMEN EXPIRATION DATE: NORMAL
T PALLIDUM AB SER QL: NONREACTIVE
WBC # BLD AUTO: 8 10E3/UL (ref 4–11)

## 2021-10-11 PROCEDURE — 86780 TREPONEMA PALLIDUM: CPT | Performed by: OBSTETRICS & GYNECOLOGY

## 2021-10-11 PROCEDURE — 272N000001 HC OR GENERAL SUPPLY STERILE: Performed by: OBSTETRICS & GYNECOLOGY

## 2021-10-11 PROCEDURE — 250N000011 HC RX IP 250 OP 636: Performed by: NURSE ANESTHETIST, CERTIFIED REGISTERED

## 2021-10-11 PROCEDURE — 370N000017 HC ANESTHESIA TECHNICAL FEE, PER MIN: Performed by: OBSTETRICS & GYNECOLOGY

## 2021-10-11 PROCEDURE — 120N000001 HC R&B MED SURG/OB

## 2021-10-11 PROCEDURE — 710N000010 HC RECOVERY PHASE 1, LEVEL 2, PER MIN: Performed by: OBSTETRICS & GYNECOLOGY

## 2021-10-11 PROCEDURE — 64488 TAP BLOCK BI INJECTION: CPT | Mod: XU | Performed by: NURSE ANESTHETIST, CERTIFIED REGISTERED

## 2021-10-11 PROCEDURE — 360N000076 HC SURGERY LEVEL 3, PER MIN: Performed by: OBSTETRICS & GYNECOLOGY

## 2021-10-11 PROCEDURE — 86901 BLOOD TYPING SEROLOGIC RH(D): CPT | Performed by: OBSTETRICS & GYNECOLOGY

## 2021-10-11 PROCEDURE — 258N000003 HC RX IP 258 OP 636: Performed by: NURSE ANESTHETIST, CERTIFIED REGISTERED

## 2021-10-11 PROCEDURE — 250N000009 HC RX 250: Performed by: OBSTETRICS & GYNECOLOGY

## 2021-10-11 PROCEDURE — 258N000003 HC RX IP 258 OP 636: Performed by: OBSTETRICS & GYNECOLOGY

## 2021-10-11 PROCEDURE — 85025 COMPLETE CBC W/AUTO DIFF WBC: CPT | Performed by: OBSTETRICS & GYNECOLOGY

## 2021-10-11 PROCEDURE — 250N000011 HC RX IP 250 OP 636: Performed by: OBSTETRICS & GYNECOLOGY

## 2021-10-11 PROCEDURE — 59510 CESAREAN DELIVERY: CPT | Performed by: NURSE ANESTHETIST, CERTIFIED REGISTERED

## 2021-10-11 PROCEDURE — 250N000009 HC RX 250: Performed by: NURSE ANESTHETIST, CERTIFIED REGISTERED

## 2021-10-11 PROCEDURE — 250N000013 HC RX MED GY IP 250 OP 250 PS 637: Performed by: OBSTETRICS & GYNECOLOGY

## 2021-10-11 PROCEDURE — 59510 CESAREAN DELIVERY: CPT | Performed by: OBSTETRICS & GYNECOLOGY

## 2021-10-11 PROCEDURE — 999N000157 HC STATISTIC RCP TIME EA 10 MIN

## 2021-10-11 PROCEDURE — 36415 COLL VENOUS BLD VENIPUNCTURE: CPT | Performed by: OBSTETRICS & GYNECOLOGY

## 2021-10-11 PROCEDURE — 88307 TISSUE EXAM BY PATHOLOGIST: CPT

## 2021-10-11 RX ORDER — CEFAZOLIN SODIUM 2 G/100ML
2 INJECTION, SOLUTION INTRAVENOUS SEE ADMIN INSTRUCTIONS
Status: DISCONTINUED | OUTPATIENT
Start: 2021-10-11 | End: 2021-10-11 | Stop reason: HOSPADM

## 2021-10-11 RX ORDER — TRANEXAMIC ACID 10 MG/ML
1 INJECTION, SOLUTION INTRAVENOUS EVERY 30 MIN PRN
Status: DISCONTINUED | OUTPATIENT
Start: 2021-10-11 | End: 2021-10-13 | Stop reason: HOSPADM

## 2021-10-11 RX ORDER — HYDROCORTISONE 2.5 %
CREAM (GRAM) TOPICAL 3 TIMES DAILY PRN
Status: DISCONTINUED | OUTPATIENT
Start: 2021-10-11 | End: 2021-10-13 | Stop reason: HOSPADM

## 2021-10-11 RX ORDER — AMOXICILLIN 250 MG
1 CAPSULE ORAL 2 TIMES DAILY
Status: DISCONTINUED | OUTPATIENT
Start: 2021-10-11 | End: 2021-10-13 | Stop reason: HOSPADM

## 2021-10-11 RX ORDER — CEFAZOLIN SODIUM 2 G/100ML
INJECTION, SOLUTION INTRAVENOUS PRN
Status: DISCONTINUED | OUTPATIENT
Start: 2021-10-11 | End: 2021-10-11

## 2021-10-11 RX ORDER — CARBOPROST TROMETHAMINE 250 UG/ML
250 INJECTION, SOLUTION INTRAMUSCULAR
Status: DISCONTINUED | OUTPATIENT
Start: 2021-10-11 | End: 2021-10-11 | Stop reason: HOSPADM

## 2021-10-11 RX ORDER — OXYTOCIN 10 [USP'U]/ML
10 INJECTION, SOLUTION INTRAMUSCULAR; INTRAVENOUS
Status: DISCONTINUED | OUTPATIENT
Start: 2021-10-11 | End: 2021-10-13 | Stop reason: HOSPADM

## 2021-10-11 RX ORDER — MISOPROSTOL 100 UG/1
400 TABLET ORAL
Status: DISCONTINUED | OUTPATIENT
Start: 2021-10-11 | End: 2021-10-11 | Stop reason: HOSPADM

## 2021-10-11 RX ORDER — ACETAMINOPHEN 10 MG/ML
INJECTION, SOLUTION INTRAVENOUS PRN
Status: DISCONTINUED | OUTPATIENT
Start: 2021-10-11 | End: 2021-10-11

## 2021-10-11 RX ORDER — BISACODYL 10 MG
10 SUPPOSITORY, RECTAL RECTAL DAILY PRN
Status: DISCONTINUED | OUTPATIENT
Start: 2021-10-13 | End: 2021-10-13 | Stop reason: HOSPADM

## 2021-10-11 RX ORDER — AMOXICILLIN 250 MG
2 CAPSULE ORAL 2 TIMES DAILY
Status: DISCONTINUED | OUTPATIENT
Start: 2021-10-11 | End: 2021-10-13 | Stop reason: HOSPADM

## 2021-10-11 RX ORDER — ONDANSETRON 4 MG/1
4 TABLET, ORALLY DISINTEGRATING ORAL EVERY 30 MIN PRN
Status: DISCONTINUED | OUTPATIENT
Start: 2021-10-11 | End: 2021-10-11 | Stop reason: HOSPADM

## 2021-10-11 RX ORDER — DEXAMETHASONE SODIUM PHOSPHATE 10 MG/ML
INJECTION, SOLUTION INTRAMUSCULAR; INTRAVENOUS
Status: DISCONTINUED | OUTPATIENT
Start: 2021-10-11 | End: 2021-10-11

## 2021-10-11 RX ORDER — MAGNESIUM HYDROXIDE 1200 MG/15ML
LIQUID ORAL PRN
Status: DISCONTINUED | OUTPATIENT
Start: 2021-10-11 | End: 2021-10-11 | Stop reason: HOSPADM

## 2021-10-11 RX ORDER — OXYTOCIN 10 [USP'U]/ML
10 INJECTION, SOLUTION INTRAMUSCULAR; INTRAVENOUS
Status: DISCONTINUED | OUTPATIENT
Start: 2021-10-11 | End: 2021-10-11 | Stop reason: HOSPADM

## 2021-10-11 RX ORDER — ACETAMINOPHEN 325 MG/1
975 TABLET ORAL ONCE
Status: DISCONTINUED | OUTPATIENT
Start: 2021-10-11 | End: 2021-10-11 | Stop reason: HOSPADM

## 2021-10-11 RX ORDER — NALOXONE HYDROCHLORIDE 0.4 MG/ML
0.4 INJECTION, SOLUTION INTRAMUSCULAR; INTRAVENOUS; SUBCUTANEOUS
Status: DISCONTINUED | OUTPATIENT
Start: 2021-10-11 | End: 2021-10-13 | Stop reason: HOSPADM

## 2021-10-11 RX ORDER — FENTANYL CITRATE 50 UG/ML
50 INJECTION, SOLUTION INTRAMUSCULAR; INTRAVENOUS EVERY 5 MIN PRN
Status: DISCONTINUED | OUTPATIENT
Start: 2021-10-11 | End: 2021-10-11 | Stop reason: HOSPADM

## 2021-10-11 RX ORDER — NALOXONE HYDROCHLORIDE 0.4 MG/ML
0.2 INJECTION, SOLUTION INTRAMUSCULAR; INTRAVENOUS; SUBCUTANEOUS
Status: DISCONTINUED | OUTPATIENT
Start: 2021-10-11 | End: 2021-10-13 | Stop reason: HOSPADM

## 2021-10-11 RX ORDER — HYDROMORPHONE HYDROCHLORIDE 1 MG/ML
0.4 INJECTION, SOLUTION INTRAMUSCULAR; INTRAVENOUS; SUBCUTANEOUS EVERY 5 MIN PRN
Status: DISCONTINUED | OUTPATIENT
Start: 2021-10-11 | End: 2021-10-11 | Stop reason: HOSPADM

## 2021-10-11 RX ORDER — OXYTOCIN/0.9 % SODIUM CHLORIDE 30/500 ML
100-340 PLASTIC BAG, INJECTION (ML) INTRAVENOUS CONTINUOUS PRN
Status: DISCONTINUED | OUTPATIENT
Start: 2021-10-11 | End: 2021-10-13 | Stop reason: HOSPADM

## 2021-10-11 RX ORDER — BUPIVACAINE HYDROCHLORIDE 2.5 MG/ML
INJECTION, SOLUTION EPIDURAL; INFILTRATION; INTRACAUDAL
Status: DISCONTINUED | OUTPATIENT
Start: 2021-10-11 | End: 2021-10-11

## 2021-10-11 RX ORDER — OXYCODONE HYDROCHLORIDE 5 MG/1
5 TABLET ORAL EVERY 4 HOURS PRN
Status: DISCONTINUED | OUTPATIENT
Start: 2021-10-11 | End: 2021-10-13 | Stop reason: HOSPADM

## 2021-10-11 RX ORDER — LIDOCAINE 40 MG/G
CREAM TOPICAL
Status: DISCONTINUED | OUTPATIENT
Start: 2021-10-11 | End: 2021-10-13 | Stop reason: HOSPADM

## 2021-10-11 RX ORDER — PROCHLORPERAZINE MALEATE 10 MG
10 TABLET ORAL EVERY 6 HOURS PRN
Status: DISCONTINUED | OUTPATIENT
Start: 2021-10-11 | End: 2021-10-13 | Stop reason: HOSPADM

## 2021-10-11 RX ORDER — MISOPROSTOL 100 UG/1
400 TABLET ORAL
Status: DISCONTINUED | OUTPATIENT
Start: 2021-10-11 | End: 2021-10-13 | Stop reason: HOSPADM

## 2021-10-11 RX ORDER — OXYCODONE HYDROCHLORIDE 5 MG/1
5 TABLET ORAL EVERY 4 HOURS PRN
Status: DISCONTINUED | OUTPATIENT
Start: 2021-10-11 | End: 2021-10-11 | Stop reason: HOSPADM

## 2021-10-11 RX ORDER — DEXTROSE, SODIUM CHLORIDE, SODIUM LACTATE, POTASSIUM CHLORIDE, AND CALCIUM CHLORIDE 5; .6; .31; .03; .02 G/100ML; G/100ML; G/100ML; G/100ML; G/100ML
INJECTION, SOLUTION INTRAVENOUS CONTINUOUS
Status: DISCONTINUED | OUTPATIENT
Start: 2021-10-11 | End: 2021-10-12

## 2021-10-11 RX ORDER — LIDOCAINE HYDROCHLORIDE 10 MG/ML
INJECTION, SOLUTION INFILTRATION; PERINEURAL PRN
Status: DISCONTINUED | OUTPATIENT
Start: 2021-10-11 | End: 2021-10-11

## 2021-10-11 RX ORDER — LIDOCAINE 40 MG/G
CREAM TOPICAL
Status: DISCONTINUED | OUTPATIENT
Start: 2021-10-11 | End: 2021-10-11 | Stop reason: HOSPADM

## 2021-10-11 RX ORDER — FLUMAZENIL 0.1 MG/ML
0.2 INJECTION, SOLUTION INTRAVENOUS
Status: DISCONTINUED | OUTPATIENT
Start: 2021-10-11 | End: 2021-10-11 | Stop reason: HOSPADM

## 2021-10-11 RX ORDER — FENTANYL CITRATE 50 UG/ML
25-50 INJECTION, SOLUTION INTRAMUSCULAR; INTRAVENOUS
Status: DISCONTINUED | OUTPATIENT
Start: 2021-10-11 | End: 2021-10-11 | Stop reason: HOSPADM

## 2021-10-11 RX ORDER — METOCLOPRAMIDE 10 MG/1
10 TABLET ORAL EVERY 6 HOURS PRN
Status: DISCONTINUED | OUTPATIENT
Start: 2021-10-11 | End: 2021-10-13 | Stop reason: HOSPADM

## 2021-10-11 RX ORDER — OXYTOCIN/0.9 % SODIUM CHLORIDE 30/500 ML
PLASTIC BAG, INJECTION (ML) INTRAVENOUS CONTINUOUS PRN
Status: DISCONTINUED | OUTPATIENT
Start: 2021-10-11 | End: 2021-10-11

## 2021-10-11 RX ORDER — PROCHLORPERAZINE 25 MG
25 SUPPOSITORY, RECTAL RECTAL EVERY 12 HOURS PRN
Status: DISCONTINUED | OUTPATIENT
Start: 2021-10-11 | End: 2021-10-13 | Stop reason: HOSPADM

## 2021-10-11 RX ORDER — ONDANSETRON 4 MG/1
4 TABLET, ORALLY DISINTEGRATING ORAL EVERY 6 HOURS PRN
Status: DISCONTINUED | OUTPATIENT
Start: 2021-10-11 | End: 2021-10-13 | Stop reason: HOSPADM

## 2021-10-11 RX ORDER — SIMETHICONE 80 MG
80 TABLET,CHEWABLE ORAL 4 TIMES DAILY PRN
Status: DISCONTINUED | OUTPATIENT
Start: 2021-10-11 | End: 2021-10-13 | Stop reason: HOSPADM

## 2021-10-11 RX ORDER — SODIUM CHLORIDE, SODIUM LACTATE, POTASSIUM CHLORIDE, CALCIUM CHLORIDE 600; 310; 30; 20 MG/100ML; MG/100ML; MG/100ML; MG/100ML
INJECTION, SOLUTION INTRAVENOUS CONTINUOUS
Status: DISCONTINUED | OUTPATIENT
Start: 2021-10-11 | End: 2021-10-11 | Stop reason: HOSPADM

## 2021-10-11 RX ORDER — IBUPROFEN 400 MG/1
800 TABLET, FILM COATED ORAL EVERY 6 HOURS
Status: DISCONTINUED | OUTPATIENT
Start: 2021-10-12 | End: 2021-10-13 | Stop reason: HOSPADM

## 2021-10-11 RX ORDER — METHYLERGONOVINE MALEATE 0.2 MG/ML
200 INJECTION INTRAVENOUS
Status: DISCONTINUED | OUTPATIENT
Start: 2021-10-11 | End: 2021-10-11 | Stop reason: HOSPADM

## 2021-10-11 RX ORDER — OXYTOCIN/0.9 % SODIUM CHLORIDE 30/500 ML
340 PLASTIC BAG, INJECTION (ML) INTRAVENOUS CONTINUOUS PRN
Status: DISCONTINUED | OUTPATIENT
Start: 2021-10-11 | End: 2021-10-11 | Stop reason: HOSPADM

## 2021-10-11 RX ORDER — METHYLERGONOVINE MALEATE 0.2 MG/ML
200 INJECTION INTRAVENOUS
Status: DISCONTINUED | OUTPATIENT
Start: 2021-10-11 | End: 2021-10-13 | Stop reason: HOSPADM

## 2021-10-11 RX ORDER — CITRIC ACID/SODIUM CITRATE 334-500MG
30 SOLUTION, ORAL ORAL
Status: COMPLETED | OUTPATIENT
Start: 2021-10-11 | End: 2021-10-11

## 2021-10-11 RX ORDER — FENTANYL CITRATE 50 UG/ML
50 INJECTION, SOLUTION INTRAMUSCULAR; INTRAVENOUS
Status: DISCONTINUED | OUTPATIENT
Start: 2021-10-11 | End: 2021-10-11 | Stop reason: HOSPADM

## 2021-10-11 RX ORDER — OXYTOCIN/0.9 % SODIUM CHLORIDE 30/500 ML
340 PLASTIC BAG, INJECTION (ML) INTRAVENOUS CONTINUOUS PRN
Status: DISCONTINUED | OUTPATIENT
Start: 2021-10-11 | End: 2021-10-13 | Stop reason: HOSPADM

## 2021-10-11 RX ORDER — MEPERIDINE HYDROCHLORIDE 50 MG/ML
12.5 INJECTION INTRAMUSCULAR; INTRAVENOUS; SUBCUTANEOUS
Status: DISCONTINUED | OUTPATIENT
Start: 2021-10-11 | End: 2021-10-11 | Stop reason: HOSPADM

## 2021-10-11 RX ORDER — ONDANSETRON 2 MG/ML
4 INJECTION INTRAMUSCULAR; INTRAVENOUS EVERY 6 HOURS PRN
Status: DISCONTINUED | OUTPATIENT
Start: 2021-10-11 | End: 2021-10-13 | Stop reason: HOSPADM

## 2021-10-11 RX ORDER — ACETAMINOPHEN 325 MG/1
975 TABLET ORAL EVERY 6 HOURS
Status: DISCONTINUED | OUTPATIENT
Start: 2021-10-11 | End: 2021-10-11

## 2021-10-11 RX ORDER — KETOROLAC TROMETHAMINE 30 MG/ML
30 INJECTION, SOLUTION INTRAMUSCULAR; INTRAVENOUS EVERY 6 HOURS
Status: COMPLETED | OUTPATIENT
Start: 2021-10-11 | End: 2021-10-12

## 2021-10-11 RX ORDER — CEFAZOLIN SODIUM 2 G/100ML
2 INJECTION, SOLUTION INTRAVENOUS
Status: DISCONTINUED | OUTPATIENT
Start: 2021-10-11 | End: 2021-10-11 | Stop reason: HOSPADM

## 2021-10-11 RX ORDER — ONDANSETRON 2 MG/ML
INJECTION INTRAMUSCULAR; INTRAVENOUS PRN
Status: DISCONTINUED | OUTPATIENT
Start: 2021-10-11 | End: 2021-10-11

## 2021-10-11 RX ORDER — TRANEXAMIC ACID 10 MG/ML
1 INJECTION, SOLUTION INTRAVENOUS EVERY 30 MIN PRN
Status: DISCONTINUED | OUTPATIENT
Start: 2021-10-11 | End: 2021-10-11 | Stop reason: HOSPADM

## 2021-10-11 RX ORDER — METOCLOPRAMIDE HYDROCHLORIDE 5 MG/ML
10 INJECTION INTRAMUSCULAR; INTRAVENOUS EVERY 6 HOURS PRN
Status: DISCONTINUED | OUTPATIENT
Start: 2021-10-11 | End: 2021-10-13 | Stop reason: HOSPADM

## 2021-10-11 RX ORDER — KETOROLAC TROMETHAMINE 30 MG/ML
INJECTION, SOLUTION INTRAMUSCULAR; INTRAVENOUS PRN
Status: DISCONTINUED | OUTPATIENT
Start: 2021-10-11 | End: 2021-10-11

## 2021-10-11 RX ORDER — MODIFIED LANOLIN
OINTMENT (GRAM) TOPICAL
Status: DISCONTINUED | OUTPATIENT
Start: 2021-10-11 | End: 2021-10-13 | Stop reason: HOSPADM

## 2021-10-11 RX ORDER — ONDANSETRON 2 MG/ML
4 INJECTION INTRAMUSCULAR; INTRAVENOUS EVERY 30 MIN PRN
Status: DISCONTINUED | OUTPATIENT
Start: 2021-10-11 | End: 2021-10-11 | Stop reason: HOSPADM

## 2021-10-11 RX ORDER — FENTANYL CITRATE 50 UG/ML
25-50 INJECTION, SOLUTION INTRAMUSCULAR; INTRAVENOUS EVERY 5 MIN PRN
Status: DISCONTINUED | OUTPATIENT
Start: 2021-10-11 | End: 2021-10-11 | Stop reason: HOSPADM

## 2021-10-11 RX ORDER — ACETAMINOPHEN 325 MG/1
975 TABLET ORAL EVERY 6 HOURS
Status: DISCONTINUED | OUTPATIENT
Start: 2021-10-11 | End: 2021-10-13 | Stop reason: HOSPADM

## 2021-10-11 RX ORDER — CARBOPROST TROMETHAMINE 250 UG/ML
250 INJECTION, SOLUTION INTRAMUSCULAR
Status: DISCONTINUED | OUTPATIENT
Start: 2021-10-11 | End: 2021-10-13 | Stop reason: HOSPADM

## 2021-10-11 RX ORDER — BUPIVACAINE HYDROCHLORIDE 7.5 MG/ML
INJECTION, SOLUTION INTRASPINAL PRN
Status: DISCONTINUED | OUTPATIENT
Start: 2021-10-11 | End: 2021-10-11

## 2021-10-11 RX ADMIN — SODIUM CHLORIDE 20 MCG: 900 INJECTION, SOLUTION INTRAVENOUS at 09:10

## 2021-10-11 RX ADMIN — CEFAZOLIN SODIUM 2 G: 2 INJECTION, SOLUTION INTRAVENOUS at 07:55

## 2021-10-11 RX ADMIN — ONDANSETRON HYDROCHLORIDE 4 MG: 2 SOLUTION INTRAMUSCULAR; INTRAVENOUS at 07:55

## 2021-10-11 RX ADMIN — OXYCODONE HYDROCHLORIDE 5 MG: 5 TABLET ORAL at 11:36

## 2021-10-11 RX ADMIN — Medication 340 ML/HR: at 08:26

## 2021-10-11 RX ADMIN — DEXAMETHASONE SODIUM PHOSPHATE 8 MG: 10 INJECTION, SOLUTION INTRAMUSCULAR; INTRAVENOUS at 09:10

## 2021-10-11 RX ADMIN — SODIUM CITRATE AND CITRIC ACID MONOHYDRATE 30 ML: 500; 334 SOLUTION ORAL at 06:37

## 2021-10-11 RX ADMIN — LIDOCAINE HYDROCHLORIDE 1 ML: 10 INJECTION, SOLUTION INFILTRATION; PERINEURAL at 08:00

## 2021-10-11 RX ADMIN — BUPIVACAINE HYDROCHLORIDE 40 ML: 2.5 INJECTION, SOLUTION EPIDURAL; INFILTRATION; INTRACAUDAL; PERINEURAL at 09:10

## 2021-10-11 RX ADMIN — SODIUM CHLORIDE, SODIUM LACTATE, POTASSIUM CHLORIDE, AND CALCIUM CHLORIDE 100 ML: 600; 310; 30; 20 INJECTION, SOLUTION INTRAVENOUS at 09:45

## 2021-10-11 RX ADMIN — ACETAMINOPHEN 1000 MG: 10 INJECTION, SOLUTION INTRAVENOUS at 08:22

## 2021-10-11 RX ADMIN — BUPIVACAINE HYDROCHLORIDE 0.8 ML: 7.5 INJECTION, SOLUTION INTRASPINAL at 08:05

## 2021-10-11 RX ADMIN — KETOROLAC TROMETHAMINE 30 MG: 30 INJECTION, SOLUTION INTRAMUSCULAR at 08:38

## 2021-10-11 RX ADMIN — SODIUM CHLORIDE, POTASSIUM CHLORIDE, SODIUM LACTATE AND CALCIUM CHLORIDE: 600; 310; 30; 20 INJECTION, SOLUTION INTRAVENOUS at 06:37

## 2021-10-11 RX ADMIN — KETOROLAC TROMETHAMINE 30 MG: 30 INJECTION, SOLUTION INTRAMUSCULAR; INTRAVENOUS at 17:27

## 2021-10-11 RX ADMIN — Medication 100 ML/HR: at 09:35

## 2021-10-11 RX ADMIN — PHENYLEPHRINE HYDROCHLORIDE 0.1 MCG/KG/MIN: 10 INJECTION INTRAVENOUS at 08:06

## 2021-10-11 RX ADMIN — SODIUM CHLORIDE, SODIUM LACTATE, POTASSIUM CHLORIDE, AND CALCIUM CHLORIDE: 600; 310; 30; 20 INJECTION, SOLUTION INTRAVENOUS at 09:15

## 2021-10-11 RX ADMIN — OXYCODONE HYDROCHLORIDE 5 MG: 5 TABLET ORAL at 21:25

## 2021-10-11 RX ADMIN — SODIUM CHLORIDE, SODIUM LACTATE, POTASSIUM CHLORIDE, CALCIUM CHLORIDE AND DEXTROSE MONOHYDRATE: 5; 600; 310; 30; 20 INJECTION, SOLUTION INTRAVENOUS at 11:36

## 2021-10-11 RX ADMIN — SODIUM CHLORIDE 800 ML: 900 IRRIGANT IRRIGATION at 08:48

## 2021-10-11 RX ADMIN — ACETAMINOPHEN 975 MG: 325 TABLET, FILM COATED ORAL at 20:21

## 2021-10-11 RX ADMIN — DOCUSATE SODIUM 50 MG AND SENNOSIDES 8.6 MG 2 TABLET: 8.6; 5 TABLET, FILM COATED ORAL at 20:21

## 2021-10-11 RX ADMIN — SODIUM CHLORIDE, SODIUM LACTATE, POTASSIUM CHLORIDE, AND CALCIUM CHLORIDE: 600; 310; 30; 20 INJECTION, SOLUTION INTRAVENOUS at 07:29

## 2021-10-11 RX ADMIN — ACETAMINOPHEN 975 MG: 325 TABLET, FILM COATED ORAL at 14:22

## 2021-10-11 RX ADMIN — SODIUM CHLORIDE 20 MCG: 900 INJECTION, SOLUTION INTRAVENOUS at 09:05

## 2021-10-11 NOTE — PROGRESS NOTES
Scheduled AM C/S Admit Note  Amanda Mckeon  MRN: [id number]  Gestational Age: 39w1d      Amanda Mckeon presents to unit at 0549 for scheduled repeat  section.  Patient denies contractions, bleeding or LOF. EFM strip initiated.Fetus active per patient. Wilian wipes and shaved prepped. IV started without difficulty and fluids infusing. History and plan of care reviewed. Patient has been NPO since 2300. Dr. Flower has been here to see patient Patient oriented to room, unit and plan of care. Call light within reach.  Explained admission packet

## 2021-10-11 NOTE — ANESTHESIA PROCEDURE NOTES
Lumbar puncture Procedure Note    Pre-Procedure   Staff -        CRNA: Lisbeth Valentine APRN CRNA       Performed By: CRNA       Location: OR       Procedure Start/Stop Times: 10/11/2021 8:00 AM and 10/11/2021 8:05 AM       Pre-Anesthestic Checklist: patient identified, IV checked, risks and benefits discussed, informed consent, monitors and equipment checked, pre-op evaluation, at physician/surgeon's request and post-op pain management  Timeout:       Correct Patient: Yes        Correct Procedure: Yes        Correct Site: Yes        Correct Position: Yes   Procedure Documentation  Procedure: lumbar puncture       Diagnosis: c section       Patient Position: sitting       Patient Prep/Sterile Barriers: sterile gloves, mask, patient draped       Skin prep: Chloraprep       Insertion Site: L3-4. (midline approach).       Needle Gauge: 25.        Needle Length (Inches): 3.5        Spinal Needle Type: PencanNo introducer used      # of attempts: 2 and  # of redirects:     Assessment/Narrative         Paresthesias: No.       CSF fluid: clear.    Medication(s) Administered   Medication Administration Time: 10/11/2021 8:05 AM

## 2021-10-11 NOTE — OP NOTE
OB  Delivery Note        Amanda Mckeon MRN# [id number]   Age: 30 year old YOB: 1990         GA: 39w1d  GP:   Labor Complications:    EBL:   mL  Delivery QBL:    Delivery Type:    ROM to Delivery Time: rupture date or rupture time have not been documented       1 Minute 5 Minute 10 Minute   Apgar Totals: 8   9        Personel Present: LUPILLO UREÑA;MARQUITA BARRETT       Details     Pre-Op Diagnosis: 1. Intrauterine pregnancy at 39w1d  2. Repeat    Post-Op Diagnosis: 1. Same   Indications:   Repeat   Procedure:    Repeat  via  Pfannenstiel incision   Anesthesia:  Spinal      Informed Consent:  The risks, benefits, complications, and alternatives were discussed with the patient. The patient understood that the risks of  section include, but are not limited to: injury to nearby structures or organs, infection, blood loss and possible need for transfusion, and potential need for more surgery including hysterectomy. The patient stated understanding and desired to proceed. All questions were answered. The site of surgery was properly noted and marked. The patient was identified as Amanda Mckeon and the procedure verified as a  delivery. A Time Out was held and the above information confirmed.     Procedure Details:            DESCRIPTION OF PROCEDURE:     The patient was transferred to the OR where spinal anesthesia was accomplished.  A sewell catheter was inserted and clear urine was noted.  The abdomen was scrubbed prepped and draped inthe usual manner.  A hard stop timeout was accomplished.  A transverse Pfannenstiel incision was made and sharp and electrocautery dissection carried down to the fascial layer.  The Fascia was opened in the midline andextended bluntly bilaterally.  The rectus muscles were  in the midline bluntly.  The peritoneum was bluntly divided and the Corina ring retractor was placed.  A bladder flap was made  sharply.  The lower uterinesegment was incised sharply in a low transverse fashion and the amniotic sac ruptured bluntly with a finger.  Clear fluid was noted and the incision extended bluntly.  The fetal head was deliverd in the OT position.  The infant received nasal and oropharyngeal suction with the bulb suction. The shoulders delivered atraumatically followed by the remainder of the baby.   The cord was clamped and cut and the infant handed to the  nurse.   evaluation was accomplished by Dr. Ordoñez at my request for repeat  and  risks for TTN.  The placenta was manually removed and the uterus wiped clear of clots and debris. Pitocin was added to the IVinfusion and the uterus was noted to firm-up nicely. The tubes and ovaries appeared normal. The uterine incision was closed in a double layer of #1 Chromic in a running fashion. Hemostasis was adequate.  The abdomenirrigated clear of clots and debris.  The peritoneum and the rectus  muscles were approximated with  3 0 vicryl.   The fascia was closed with 0 PDS.  The subcutaneous layer was irrigated and made hemastatic withelectrocautery.  It was closed in a single layer of 3 0 vicryl. The skin was closed with Insorb staples.  The wound was dressed with steri-strips and a pressure dressing.  Counts were correct times 2.  The patient andnewborn were transferred to the recovery room in stable condition.        Labor Events        Percy Mckeon [[id number]]         Labor Length         3rd Stage (hrs): 0 (min): 1           Delivery/Placenta Date and Time    Delivery Date: 10/11/21 Delivery Time:  8:24 AM   Placenta Date/Time: 10/11/2021  8:25 AM  Oxytocin given at the time of delivery: after delivery of baby  Delivering clinician: Omar Flower MD          Other personnel present at delivery:  Provider Role   Liza Ordoñez MD Assigned Pediatric Specialist Provider   Iesha Lee, RN Registered Nurse            Apgars    Living status: Living    1 Minute 5 Minute 10 Minute 15 Minute 20 Minute   Skin color: 0  1          Heart rate: 2  2          Reflex irritability: 2  2          Muscle tone: 2  2          Respiratory effort: 2  2          Total: 8  9          Apgars assigned by: IESHA VELASQUEZ      [place] Resuscitation    Methods: None, Suctioning, Oximetry                    Delivery (Maternal) (Provider to Complete) ([id number])                      Blood Loss  Mother: Amanda Mckeon #[id number]          Start of Mother's Information           Delivery Blood Loss  10/11/21 0820 - 10/11/21 0851            Total Surgical QBL Blood Loss (mL) Hospital Encounter -1650 mL     Total   -1650 mL                          End of Mother's Information  Mother: Amanda Mckeon #[id number]                     Delivery - Provider to Complete ([id number])    Delivering clinician: Omar Flower MD                       Other personnel:  Provider Role   Liza Ordoñez MD Assigned Pediatric Specialist Provider   Iesha Lee RN Registered Nurse                  Placenta    Date/Time: 10/11/2021  8:25 AM                         Omar Flower MD            Routing History

## 2021-10-11 NOTE — PROGRESS NOTES
Baby girl born via  section.  Dr. Flower delivered.  Farooq SHELLEY in attendance.  Vigorous, lusty cry.  Apgars 8&9. To mother for bondingn at 10 minutes of life.

## 2021-10-11 NOTE — SIGNIFICANT EVENT
PACU Transfer Note    Amanda Mckeon was transferred to via bed .  Equipment used for transport:  bed.  Accompanied by:  Anesthesia and or rn   Prescriptions were: none    PACU Respiratory Event Documentation     1) Episodes of Apnea greater than or equal to 10 seconds: none    2) Bradypnea - less than 8 breaths per minute: none    3) Pain score on 0 to 10 scale: none    4) Pain-sedation mismatch (yes or no): no    5) Repeated 02 desaturation less than 90% (yes or no): no    Anesthesia notified? (yes or no): no    Any of the above events occuring repeatedly in separate 30 minute intervals may be considered recurrent PACU respiratory events.    Patient stable and meets phase 1 discharge criteria for transport from PACU.      Report to swati

## 2021-10-11 NOTE — ANESTHESIA POSTPROCEDURE EVALUATION
Patient: Amanda Mckeon    Procedure: Procedure(s):   SECTION       Diagnosis:History of  [Z98.891]  Diagnosis Additional Information: No value filed.    Anesthesia Type:  Spinal    Note:  Disposition: Inpatient   Postop Pain Control: Uneventful            Sign Out: Well controlled pain   PONV: No   Neuro/Psych: Uneventful            Sign Out: Acceptable/Baseline neuro status   Airway/Respiratory: Uneventful            Sign Out: Acceptable/Baseline resp. status   CV/Hemodynamics: Uneventful            Sign Out: Acceptable CV status; No obvious hypovolemia; No obvious fluid overload   Other NRE: NONE   DID A NON-ROUTINE EVENT OCCUR? No           Last vitals:  Vitals Value Taken Time   BP 99/49 10/11/21 0950   Temp 97.3  F (36.3  C) 10/11/21 0955   Pulse 70 10/11/21 0955   Resp 20 10/11/21 0955   SpO2 95 % 10/11/21 0955   Vitals shown include unvalidated device data.    Electronically Signed By: BRIAN MARES CRNA  2021  9:56 AM

## 2021-10-11 NOTE — OR NURSING
Viable baby girl at 0824 via . To warmer with baby doc and nurse and RT. Neli Gleason RN on 10/11/2021 at 8:37 AM

## 2021-10-11 NOTE — ANESTHESIA CARE TRANSFER NOTE
Patient: Amanda Mckeon    Procedure: Procedure(s):   SECTION       Diagnosis: History of  [Z98.891]  Diagnosis Additional Information: No value filed.    Anesthesia Type:   Spinal     Note:    Oropharynx: oropharynx clear of all foreign objects  Level of Consciousness: awake  Oxygen Supplementation: room air        Vital Signs Stable: post-procedure vital signs reviewed and stable  Report to RN Given: handoff report given  Patient transferred to: Labor and Delivery    Handoff Report: Identifed the Patient, Identified the Reponsible Provider, Reviewed the pertinent medical history, Discussed the surgical course, Reviewed Intra-OP anesthesia mangement and issues during anesthesia, Set expectations for post-procedure period and Allowed opportunity for questions and acknowledgement of understanding      Vitals:  Vitals Value Taken Time   BP 88/58 10/11/21 0925   Temp     Pulse 67 10/11/21 0928   Resp 14 10/11/21 0928   SpO2 94 % 10/11/21 0928   Vitals shown include unvalidated device data.    Electronically Signed By: BRIAN MARES CRNA  2021  9:29 AM

## 2021-10-11 NOTE — ANESTHESIA PROCEDURE NOTES
TAP Procedure Note    Pre-Procedure   Staff -        CRNA: Lisbeth Valentine APRN CRNA       Other Anesthesia Staff: Walt Vyas APRN CRNA       Performed By: CRNA       Location: OR       Procedure Start/Stop Times: 10/11/2021 9:00 AM and 10/11/2021 9:10 AM       Pre-Anesthestic Checklist: patient identified, IV checked, site marked, risks and benefits discussed, informed consent, monitors and equipment checked, pre-op evaluation, at physician/surgeon's request and post-op pain management  Timeout:       Correct Patient: Yes        Correct Procedure: Yes        Correct Site: Yes        Correct Position: Yes        Correct Laterality: Yes        Site Marked: Yes  Procedure Documentation  Procedure: TAP       Diagnosis: C SECTION       Laterality: bilateral       Patient Position: supine       Patient Prep/Sterile Barriers: sterile gloves, mask       Skin prep: Chloraprep       Needle Gauge: 20.        Needle Length (Inches): 6        Ultrasound guided       1. Ultrasound was used to identify targeted nerve, plexus, vascular marker, or fascial plane and place a needle adjacent to it in real-time.       2. Ultrasound was used to visualize the spread of anesthetic in close proximity to the above referenced structure.       3. A permanent image is entered into the patient's record.       4. The visualized anatomic structures appeared normal.       5. There were no apparent abnormal pathologic findings.    Assessment/Narrative         The placement was negative for: blood aspirated, painful injection and site bleeding       Paresthesias: No.      Bolus given via needle. No blood aspirated via catheter.        Secured via.        Insertion/Infusion Method: Single Shot       Complications: none       Injection made incrementally with aspirations every 5 mL.    Medication(s) Administered   Bupivacaine 0.25% PF (Infiltration), 40 mL  Dexamethasone 10 mg/mL PF (Perineural), 8 mg  Medication Administration Time:  10/11/2021 9:10 AM     Comments:  20 mcg precedex added to each side of block

## 2021-10-11 NOTE — L&D DELIVERY NOTE
OB  Delivery Note      Amanda Mckeon MRN# [id number]   Age: 30 year old YOB: 1990       GA: 39w1d  GP:   Labor Complications:    EBL:   mL  Delivery QBL:    Delivery Type:    ROM to Delivery Time: rupture date or rupture time have not been documented     1 Minute 5 Minute 10 Minute   Apgar Totals: 8   9        Personel Present: LUPILLO UREÑA;MARQUITA BARRETT      Details    Pre-Op Diagnosis: 1. Intrauterine pregnancy at 39w1d  2. Repeat    Post-Op Diagnosis: 1. Same   Indications:   Repeat   Procedure:    Repeat  via  Pfannenstiel incision   Anesthesia:  Spinal     Informed Consent:  The risks, benefits, complications, and alternatives were discussed with the patient. The patient understood that the risks of  section include, but are not limited to: injury to nearby structures or organs, infection, blood loss and possible need for transfusion, and potential need for more surgery including hysterectomy. The patient stated understanding and desired to proceed. All questions were answered. The site of surgery was properly noted and marked. The patient was identified as Amanda Mckeon and the procedure verified as a  delivery. A Time Out was held and the above information confirmed.    Procedure Details:    DESCRIPTION OF PROCEDURE:     The patient was transferred to the OR where spinal anesthesia was accomplished.  A sewell catheter was inserted and clear urine was noted.  The abdomen was scrubbed prepped and draped inthe usual manner.  A hard stop timeout was accomplished.  A transverse Pfannenstiel incision was made and sharp and electrocautery dissection carried down to the fascial layer.  The Fascia was opened in the midline andextended bluntly bilaterally.  The rectus muscles were  in the midline bluntly.  The peritoneum was bluntly divided and the Corina ring retractor was placed.  A bladder flap was made sharply.  The lower  uterinesegment was incised sharply in a low transverse fashion and the amniotic sac ruptured bluntly with a finger.  Clear fluid was noted and the incision extended bluntly.  The fetal head was deliverd in the OT position.  The infant received nasal and oropharyngeal suction with the bulb suction. The shoulders delivered atraumatically followed by the remainder of the baby.   The cord was clamped and cut and the infant handed to the  nurse.   evaluation was accomplished by Dr. Ordoñez at my request for repeat  and  risks for TTN.  The placenta was manually removed and the uterus wiped clear of clots and debris. Pitocin was added to the IVinfusion and the uterus was noted to firm-up nicely. The tubes and ovaries appeared normal. The uterine incision was closed in a double layer of #1 Chromic in a running fashion. Hemostasis was adequate.  The abdomenirrigated clear of clots and debris.  The peritoneum and the rectus  muscles were approximated with  3 0 vicryl.   The fascia was closed with 0 PDS.  The subcutaneous layer was irrigated and made hemastatic withelectrocautery.  It was closed in a single layer of 3 0 vicryl. The skin was closed with Insorb staples.  The wound was dressed with steri-strips and a pressure dressing.  Counts were correct times 2.  The patient andnewborn were transferred to the recovery room in stable condition.      Percy Mckeon [[id number]]    Labor Length    3rd Stage (hrs): 0 (min): 1      Delivery/Placenta Date and Time    Delivery Date: 10/11/21 Delivery Time:  8:24 AM   Placenta Date/Time: 10/11/2021  8:25 AM  Oxytocin given at the time of delivery: after delivery of baby  Delivering clinician: Omar Flower MD   Other personnel present at delivery:  Provider Role   Liza Ordoñez MD Assigned Pediatric Specialist Provider   Iesha Lee, RN Registered Nurse         Apgars    Living status: Living   1 Minute 5 Minute 10 Minute  15 Minute 20 Minute   Skin color: 0  1       Heart rate: 2  2       Reflex irritability: 2  2       Muscle tone: 2  2       Respiratory effort: 2  2       Total: 8  9       Apgars assigned by: IESHA VELASQUEZ     [place] Resuscitation    Methods: None, Suctioning, Oximetry     Delivery (Maternal) (Provider to Complete) ([id number])       Blood Loss  Mother: Amanda Mckeon #[id number]   Start of Mother's Information    Delivery Blood Loss  10/11/21 0820 - 10/11/21 0851    Total Surgical QBL Blood Loss (mL) Hospital Encounter -1650 mL    Total  -1650 mL         End of Mother's Information  Mother: Amanda Mckeon #[id number]          Delivery - Provider to Complete ([id number])    Delivering clinician: Omar Flower MD                   Other personnel:  Provider Role   Liza Ordoñez MD Assigned Pediatric Specialist Provider   Iesha Lee RN Registered Nurse                Placenta    Date/Time: 10/11/2021  8:25 AM                  Omar Flower MD

## 2021-10-11 NOTE — H&P
[facility]    History and Physical  Obstetrics and Gynecology     Date of Admission:  10/11/2021    Assessment & Plan   Amanda Mckeon is a 30 year old female who presents with repeat   ASSESSMENT:   IUP @ 39w1d gestation for repeat .  NST periods of cat 1 and cat 2.      PLAN:   Admit - see IP orders    Omar Flower    History of Present Illness   Amanda Mckeon is a 30 year old female  39w1d  Estimated Date of Delivery: Oct 17, 2021 is calculated from Patient's last menstrual period was 01/10/2021 (exact date). [facility] .    PRENATAL COURSE  Prenatal course was complicated by diet controlled GDM      Recent Labs   Lab Test 21  1648   ABO O   RH Pos   AS Neg     Rhogam not indicated   Recent Labs   Lab Test 21  1647   HEPBANG Nonreactive   HIAGAB Nonreactive   RUQIGG 14       Past Medical History    I have reviewed this patient's medical history and updated it with pertinent information if needed.   Past Medical History:   Diagnosis Date     Cyst of right ovary     2016     Diet controlled gestational diabetes mellitus (GDM) in third trimester      Herpesviral vesicular dermatitis     2016     Hypothyroidism     2015     Melanoma of skin (H) 2021       Past Surgical History   I have reviewed this patient's surgical history and updated it with pertinent information if needed.  Past Surgical History:   Procedure Laterality Date     ADENOIDECTOMY      as a child     AS SHOULDER ARTHROSCOPY, DX Right     Dr. Bolivar - age 12      SECTION N/A 8/3/2019    Procedure:  SECTION;  Surgeon: Omar Flower MD;  Location: [facility] OR     MAMMOPLASTY REDUCTION           PE TUBES      as a child       Prior to Admission Medications   Prior to Admission Medications   Prescriptions Last Dose Informant Patient Reported? Taking?   Prenatal Vit-Fe Fumarate-FA (PRENATAL MULTIVITAMIN W/IRON) 27-0.8 MG  tablet 10/10/2021 at 2200  No Yes   Sig: Take 1 tablet by mouth daily   acyclovir (ZOVIRAX) 800 MG tablet   No No   Sig: Take 1 tablet (800 mg) by mouth 5 times daily   Patient not taking: Reported on 10/7/2021   alcohol swab prep pads   No No   Sig: Use to swab area of injection/martir as directed.   blood glucose (NO BRAND SPECIFIED) lancing device   No No   Sig: Device to be used with lancets.   blood glucose (NO BRAND SPECIFIED) test strip   No No   Sig: Use to test blood sugar 4 times daily or as directed. To accompany: Blood Glucose Monitor Brands: AccuChek Guide strips.   blood glucose calibration (NO BRAND SPECIFIED) solution   No No   Sig: To accompany: Blood Glucose Monitor Brands: per insurance.   blood glucose monitoring (NO BRAND SPECIFIED) meter device kit   No No   Sig: Use to test blood sugar 4 times daily or as directed. Preferred blood glucose meter OR supplies to accompany: Blood Glucose Monitor Brands: per insurance.   fluticasone (FLONASE) 50 MCG/ACT nasal spray   No No   Sig: Spray 2 sprays into both nostrils daily   Patient taking differently: Spray 2 sprays into both nostrils daily as needed    folic acid (FOLVITE) 1 MG tablet   No No   Sig: Take 1 tablet (1 mg) by mouth daily   levothyroxine (SYNTHROID/LEVOTHROID) 125 MCG tablet 10/10/2021 at 0800  No Yes   Sig: Take 1 tablet (125 mcg) by mouth daily   thin (NO BRAND SPECIFIED) lancets   No No   Sig: Use with lanceting device. To accompany: Blood Glucose Monitor Brands: per insurance.      Facility-Administered Medications: None     Allergies   Allergies   Allergen Reactions     Amoxicillin Hives     Childhood       Social History   I have reviewed this patient's social history and updated it with pertinent information if needed. Amanda Mckeon  reports that she has never smoked. She has never used smokeless tobacco. She reports that she does not drink alcohol and does not use drugs.    Family History   I have reviewed this patient's family  history and updated it with pertinent information if needed.   Family History   Problem Relation Age of Onset     Family History Negative Mother      Lupus Father      Family History Negative Brother      Kidney Disease Maternal Grandmother      Bladder Cancer Maternal Grandfather      Family History Negative Paternal Grandmother      Diabetes Paternal Grandfather        Immunization History   Immunization status is unknown    Physical Exam                      Vital Signs with Ranges       Abdomen: gravid, single vertex fetus, non-tender.    Fetal Heart Tones: 120 baseline, moderate variablility, + accels, no decels and Category I    Constitutional: healthy, alert, active and no distress   Respiratory: No increased work of breathing, good air exchange, clear to auscultation bilaterally, no crackles or wheezing  Cardiovascular: Normal apical impulse, regular rate and rhythm, normal S1 and S2, no S3 or S4, and no murmur noted  Skin/Extremites: no rashes and no lesions  Neurologic: A&O x 3

## 2021-10-11 NOTE — ANESTHESIA PREPROCEDURE EVALUATION
Anesthesia Pre-Procedure Evaluation    Patient: Amanda Mckeon   MRN: [id number] : 1990        Preoperative Diagnosis: History of  [Z98.891]    Procedure : Procedure(s):   SECTION          Past Medical History:   Diagnosis Date     Cyst of right ovary     2016     Diet controlled gestational diabetes mellitus (GDM) in third trimester      Herpesviral vesicular dermatitis     2016     Hypothyroidism     2015     Melanoma of skin (H) 2021      Past Surgical History:   Procedure Laterality Date     ADENOIDECTOMY      as a child     AS SHOULDER ARTHROSCOPY, DX Right     Dr. Bolivar - age 12      SECTION N/A 8/3/2019    Procedure:  SECTION;  Surgeon: Omar Flower MD;  Location: [facility] OR     MAMMOPLASTY REDUCTION           PE TUBES      as a child      Allergies   Allergen Reactions     Amoxicillin Hives     Childhood      Social History     Tobacco Use     Smoking status: Never Smoker     Smokeless tobacco: Never Used   Substance Use Topics     Alcohol use: No      Wt Readings from Last 1 Encounters:   10/07/21 93.4 kg (206 lb)        Anesthesia Evaluation   Pt has had prior anesthetic. Type: Regional.        ROS/MED HX  ENT/Pulmonary: Comment: Negative for symptoms for COVID       Neurologic:  - neg neurologic ROS     Cardiovascular:       METS/Exercise Tolerance: 4 - Raking leaves, gardening    Hematologic:  - neg hematologic  ROS     Musculoskeletal:  - neg musculoskeletal ROS     GI/Hepatic:       Renal/Genitourinary:  - neg Renal ROS     Endo:     (+) thyroid problem, gestational diabetes,    Psychiatric/Substance Use:  - neg psychiatric ROS     Infectious Disease:  - neg infectious disease ROS     Malignancy:   (+) Malignancy, History of Skin.Skin CA status post Surgery.        Other:      (+) , previous ,         Physical Exam    Airway        Mallampati: II   TM distance: > 3 FB   Neck ROM: full   Mouth opening: > 3 cm    Respiratory Devices  and Support         Dental  no notable dental history         Cardiovascular   cardiovascular exam normal          Pulmonary   pulmonary exam normal                OUTSIDE LABS:  CBC:   Lab Results   Component Value Date    WBC 8.0 10/11/2021    WBC 11.9 (H) 03/05/2021    HGB 12.8 10/11/2021    HGB 12.9 09/24/2021    HCT 37.7 10/11/2021    HCT 38.4 03/05/2021     10/11/2021     03/05/2021     BMP:   Lab Results   Component Value Date     01/21/2021     01/16/2018    POTASSIUM 4.1 01/21/2021    POTASSIUM 4.0 01/16/2018    CHLORIDE 104 01/21/2021    CHLORIDE 105 01/16/2018    CO2 24 01/21/2021    CO2 18 (L) 01/16/2018    BUN 15 01/21/2021    BUN 13 01/16/2018    CR 0.73 01/21/2021    CR 0.66 (L) 01/16/2018     01/21/2021     (H) 12/31/2019     COAGS: No results found for: PTT, INR, FIBR  POC:   Lab Results   Component Value Date    HCG Positive (A) 02/08/2021     HEPATIC:   Lab Results   Component Value Date    ALBUMIN 4.8 01/21/2021    PROTTOTAL 7.7 01/21/2021    ALT 22 01/21/2021    AST 14 01/21/2021    ALKPHOS 58 01/21/2021    BILITOTAL 0.5 01/21/2021     OTHER:   Lab Results   Component Value Date    A1C 5.1 01/21/2021    HENRY 9.6 01/21/2021    LIPASE 16.0 01/16/2018    AMYLASE 23 (L) 01/16/2018    TSH 0.67 09/24/2021    T4 0.96 08/14/2020       Anesthesia Plan    ASA Status:  2   NPO Status:  NPO Appropriate    Anesthesia Type: Spinal.              Consents    Anesthesia Plan(s) and associated risks, benefits, and realistic alternatives discussed. Questions answered and patient/representative(s) expressed understanding.     - Discussed with:  Patient      - Extended Intubation/Ventilatory Support Discussed: No.      - Patient is DNR/DNI Status: No    Use of blood products discussed: Yes.     - Discussed with: Patient.     - Consented: consented to blood products            Reason for refusal: other.     Postoperative Care       PONV prophylaxis: Ondansetron (or other 5HT-3)      Comments:    BRIAN Payan CRNA

## 2021-10-11 NOTE — OR NURSING
The patient received 95.97  mL of Pitocin 30 units in 500 mL of 0.9% Normal Saline during the OR phase and 71.9  mL during the PACU phase.71.9

## 2021-10-12 VITALS
TEMPERATURE: 97.7 F | RESPIRATION RATE: 16 BRPM | SYSTOLIC BLOOD PRESSURE: 103 MMHG | HEART RATE: 66 BPM | OXYGEN SATURATION: 97 % | DIASTOLIC BLOOD PRESSURE: 56 MMHG

## 2021-10-12 LAB — HGB BLD-MCNC: 11.3 G/DL (ref 11.7–15.7)

## 2021-10-12 PROCEDURE — 250N000013 HC RX MED GY IP 250 OP 250 PS 637: Performed by: OBSTETRICS & GYNECOLOGY

## 2021-10-12 PROCEDURE — 120N000001 HC R&B MED SURG/OB

## 2021-10-12 PROCEDURE — 99207 PR NO CHARGE LOS: CPT | Performed by: OBSTETRICS & GYNECOLOGY

## 2021-10-12 PROCEDURE — 36415 COLL VENOUS BLD VENIPUNCTURE: CPT | Performed by: OBSTETRICS & GYNECOLOGY

## 2021-10-12 PROCEDURE — 250N000011 HC RX IP 250 OP 636: Performed by: OBSTETRICS & GYNECOLOGY

## 2021-10-12 PROCEDURE — 85018 HEMOGLOBIN: CPT | Performed by: OBSTETRICS & GYNECOLOGY

## 2021-10-12 RX ORDER — LEVOTHYROXINE SODIUM 125 UG/1
125 TABLET ORAL
Status: DISCONTINUED | OUTPATIENT
Start: 2021-10-12 | End: 2021-10-13 | Stop reason: HOSPADM

## 2021-10-12 RX ADMIN — ACETAMINOPHEN 975 MG: 325 TABLET, FILM COATED ORAL at 07:46

## 2021-10-12 RX ADMIN — SIMETHICONE 80 MG: 80 TABLET, CHEWABLE ORAL at 16:23

## 2021-10-12 RX ADMIN — IBUPROFEN 800 MG: 400 TABLET, FILM COATED ORAL at 18:03

## 2021-10-12 RX ADMIN — OXYCODONE HYDROCHLORIDE 5 MG: 5 TABLET ORAL at 22:39

## 2021-10-12 RX ADMIN — KETOROLAC TROMETHAMINE 30 MG: 30 INJECTION, SOLUTION INTRAMUSCULAR; INTRAVENOUS at 00:09

## 2021-10-12 RX ADMIN — IBUPROFEN 800 MG: 400 TABLET, FILM COATED ORAL at 12:43

## 2021-10-12 RX ADMIN — LEVOTHYROXINE SODIUM 125 MCG: 0.12 TABLET ORAL at 14:11

## 2021-10-12 RX ADMIN — ACETAMINOPHEN 975 MG: 325 TABLET, FILM COATED ORAL at 14:11

## 2021-10-12 RX ADMIN — OXYCODONE HYDROCHLORIDE 5 MG: 5 TABLET ORAL at 07:08

## 2021-10-12 RX ADMIN — ACETAMINOPHEN 975 MG: 325 TABLET, FILM COATED ORAL at 03:33

## 2021-10-12 RX ADMIN — DOCUSATE SODIUM 50 MG AND SENNOSIDES 8.6 MG 1 TABLET: 8.6; 5 TABLET, FILM COATED ORAL at 07:46

## 2021-10-12 RX ADMIN — KETOROLAC TROMETHAMINE 30 MG: 30 INJECTION, SOLUTION INTRAMUSCULAR; INTRAVENOUS at 06:32

## 2021-10-12 RX ADMIN — DOCUSATE SODIUM 50 MG AND SENNOSIDES 8.6 MG 2 TABLET: 8.6; 5 TABLET, FILM COATED ORAL at 21:22

## 2021-10-12 RX ADMIN — ACETAMINOPHEN 975 MG: 325 TABLET, FILM COATED ORAL at 21:22

## 2021-10-12 NOTE — PROGRESS NOTES
VSS. Fundus 2 below umbilicus, light bleeding. Passed one quarter size clot overnight. Voiding spontaneously. Up ad charlette. Was bladder scanned one time at the beginning of the night due to patient c/o abdominal pressure. Scanned for 115. Patient had no further complaints. Was given one dose of oxycodone at 2125. On scheduled tylenol and toradol. Using ice and abdominal binder. Incision CDI. Patient passing gas. Will continue to monitor and intervene appropriately.

## 2021-10-12 NOTE — PLAN OF CARE
Patient feels pressure and pain with the need to void but is unable to bear weight at this time.  Bladder scan for <999mL.  Assist of two people and Chiqui Stand to bathroom. She is able to void only 100ml. Straight cath for 1200mL of clear urine.  Patient feels relief. Will assist to bathroom again in 1-2 hours.

## 2021-10-12 NOTE — PLAN OF CARE
Pt is post  day 1. Incision CDI. Steri strips in place. Showered. Up ind. With pain controlled. Fundus firm U/1. Small rubra. Using nipple shield for breast feeding. Attentive to baby. Hopes to discharge home tomorrow.

## 2021-10-12 NOTE — PROVIDER NOTIFICATION
Bladder scanned as well due to patient complaint of pressure and cramping (was also breastfeeding at time of complaint). Scanned for 115 mls. Patient had voided 500 mls 30 minutes prior. Patient reports feeling better after laying down and while not breastfeeding. Will continue to monitor.

## 2021-10-12 NOTE — PROGRESS NOTES
[facility]    Obstetrics Post-Op / Progress Note    Assessment & Plan   Assessment:  -1 Day Post-Op  Procedure(s):   SECTION    Doing well.  Clean wound without signs of infection.  No immediate surgical complications identified.  No excessive bleeding  Pain well-controlled.    Plan:  Ambulation encouraged  Anticipate discharge tomorrow    Omar Flower     Interval History   Doing well.  Pain is well-controlled.  No fevers.  No history of wound drainage, warmth or significant erythema.  Good appetite.  Denies chest pain, shortness of breath, nausea or vomiting.  Ambulatory.  Breastfeeding well.    Medications     oxytocin in 0.9% NaCl 100 mL/hr (10/11/21 1046)     oxytocin in 0.9% NaCl         acetaminophen  975 mg Oral Q6H     ibuprofen  800 mg Oral Q6H     Measles, Mumps & Rubella Vac  0.5 mL Subcutaneous Once     senna-docusate  1 tablet Oral BID    Or     senna-docusate  2 tablet Oral BID     sodium chloride (PF)  3 mL Intracatheter Q8H     Tdap (tetanus-diptheria-acell pertussis)  0.5 mL Intramuscular Once       Physical Exam   Temp: 97  F (36.1  C) Temp src: Temporal BP: 109/79 Pulse: 67   Resp: 16 SpO2: 97 % O2 Device: None (Room air)    There were no vitals filed for this visit.  Vital Signs with Ranges  Temp:  [96.3  F (35.7  C)-98  F (36.7  C)] 97  F (36.1  C)  Pulse:  [55-75] 67  Resp:  [12-30] 16  BP: ()/(46-79) 109/79  SpO2:  [94 %-98 %] 97 %  I/O last 3 completed shifts:  In: 1000 [I.V.:1000]  Out: 1849 [Urine:1305; Blood:544]    Uterine fundus is firm, non-tender and at the level of the umbilicus  Incision C/D/I  Extremities Non-tender    Data   Recent Labs   Lab Test 10/11/21  0627 03/05/21  1648   ABO  --  O   RH  --  Pos   AS Negative Neg     Recent Labs   Lab Test 10/12/21  0658 10/11/21  0627   HGB 11.3* 12.8     Recent Labs   Lab Test 21   RUQIGG 14

## 2021-10-12 NOTE — PLAN OF CARE
Amanda Mckeon is doing well after  section. Her dressing is still in place and does not have visible drainage on it.Traxi was removed due to perspiration. Fundus is firm with light bleeding and no clots. Patient is breast feeding well with a nipple shield. Her pain is well managed with scheduled analgesics, ice packs, and warm packs.  Patient was assisted to bathroom x2. Since initial trip to Bathroom. She is now able to void spontaneously.  Bowels sounds are active but she denies passing gas. She is tolerating a regular diet.  Patient denies any shortness of breath, loss of taste or smell, headache.  O2 saturation is 96% on room air.  Will continue to monitor and address patient needs.

## 2021-10-13 LAB
PATH REPORT.COMMENTS IMP SPEC: NORMAL
PATH REPORT.FINAL DX SPEC: NORMAL
PHOTO IMAGE: NORMAL

## 2021-10-13 PROCEDURE — 99207 PR NO CHARGE LOS: CPT | Performed by: OBSTETRICS & GYNECOLOGY

## 2021-10-13 PROCEDURE — 250N000013 HC RX MED GY IP 250 OP 250 PS 637: Performed by: OBSTETRICS & GYNECOLOGY

## 2021-10-13 RX ORDER — AMOXICILLIN 250 MG
1 CAPSULE ORAL 2 TIMES DAILY PRN
Qty: 20 TABLET | Refills: 0 | Status: SHIPPED | OUTPATIENT
Start: 2021-10-13 | End: 2021-11-08

## 2021-10-13 RX ORDER — OXYCODONE HYDROCHLORIDE 5 MG/1
5 TABLET ORAL EVERY 4 HOURS PRN
Qty: 30 TABLET | Refills: 0 | Status: SHIPPED | OUTPATIENT
Start: 2021-10-13 | End: 2021-11-08

## 2021-10-13 RX ORDER — IBUPROFEN 600 MG/1
600 TABLET, FILM COATED ORAL EVERY 6 HOURS PRN
Qty: 30 TABLET | Refills: 0 | Status: SHIPPED | OUTPATIENT
Start: 2021-10-13 | End: 2021-11-08

## 2021-10-13 RX ADMIN — IBUPROFEN 800 MG: 400 TABLET, FILM COATED ORAL at 00:09

## 2021-10-13 RX ADMIN — ACETAMINOPHEN 975 MG: 325 TABLET, FILM COATED ORAL at 08:33

## 2021-10-13 RX ADMIN — SIMETHICONE 80 MG: 80 TABLET, CHEWABLE ORAL at 08:33

## 2021-10-13 RX ADMIN — IBUPROFEN 800 MG: 400 TABLET, FILM COATED ORAL at 05:51

## 2021-10-13 RX ADMIN — DOCUSATE SODIUM 50 MG AND SENNOSIDES 8.6 MG 2 TABLET: 8.6; 5 TABLET, FILM COATED ORAL at 08:33

## 2021-10-13 RX ADMIN — LEVOTHYROXINE SODIUM 125 MCG: 0.12 TABLET ORAL at 08:33

## 2021-10-13 RX ADMIN — BISACODYL 10 MG: 10 SUPPOSITORY RECTAL at 09:13

## 2021-10-13 RX ADMIN — ACETAMINOPHEN 975 MG: 325 TABLET, FILM COATED ORAL at 03:03

## 2021-10-13 NOTE — PROGRESS NOTES
[facility]    Discharge Summary  Obstetrics    Date of Admission:  10/11/2021  Date of Discharge:  10/13/2021  Discharging Provider: Omar Flower    Discharge Diagnoses   History of  [Z98.891]    Patient Active Problem List   Diagnosis     Ganglion cyst     Recurrent cold sores     Hypothyroidism, unspecified type     Chronic seasonal allergic rhinitis, unspecified trigger     Encounter for contraceptive management, unspecified type     Pregnancy, unspecified gestational age     Gestational diabetes mellitus (GDM) in third trimester, gestational diabetes method of control unspecified     Encounter for triage in pregnant patient     Pregnancy     History of  section     Melanoma in situ of left lower leg (H)     Abnormal ultrasonic finding on  screening of mother     Diet controlled gestational diabetes mellitus (GDM) in third trimester     History of        Procedure/Surgery Information   Procedure: Procedure(s):   SECTION   Surgeon(s): Surgeon(s) and Role:     * Omar Flower MD - Primary     History of Present Illness   Amanda Mckeon is a 30 year old female who presented with repeat  section    Hospital Course   The patient's hospital course was unremarkable.  She recovered as anticipated and experienced no post-operative complications.  On discharge, her pain was well controlled. Vaginal bleeding is similar to peak menstrual flow.  Voiding without difficulty.  Ambulating well and tolerating a normal diet.  No fever or significant wound drainage.  Breastfeeding well.  Infant is stable.  She was discharged on post-partum day #3.    Post-partum hemoglobin:   Hemoglobin   Date Value Ref Range Status   10/12/2021 11.3 (L) 11.7 - 15.7 g/dL Final   2021 12.2 11.7 - 15.7 g/dL Final       Omar Flower MD    Discharge Disposition   Discharged to home   Condition at discharge: Stable    Pending Results   Final pathology results:  pending  These results will be followed up by Omar Flower MD    Unresulted Labs Ordered in the Past 30 Days of this Admission     Date and Time Order Name Status Description    10/11/2021  8:33 AM Surgical Pathology Exam In process           Primary Care Physician   Liza Ordoñez    Consultations This Hospital Stay   HOME CARE POST PARTUM/ IP CONSULT  LACTATION IP CONSULT    Discharge Orders      Activity    Activity as tolerated     Reason for your hospital stay    Maternity care     Follow Up    Follow up with provider in 2 weeks and 6 weeks for post-delivery checks     Breast pump    Breast Pump Documentation:  Manual/Electric Pump: To support adequate breast milk production and nutrition for infant.     I, the undersigned, certify that the above prescribed supplies are medically necessary for this patient and is both reasonable and necessary in reference to accepted standards of medical and necessary in reference to accepted standards of medical practice in the treatment of this patient's condition and is not prescribed as a convenience.     Diet    Resume previous diet     Discharge Medications   Current Discharge Medication List      START taking these medications    Details   ibuprofen (ADVIL/MOTRIN) 600 MG tablet Take 1 tablet (600 mg) by mouth every 6 hours as needed for moderate pain  Qty: 30 tablet, Refills: 0    Associated Diagnoses: Postoperative pain      oxyCODONE (ROXICODONE) 5 MG tablet Take 1 tablet (5 mg) by mouth every 4 hours as needed for pain  Qty: 30 tablet, Refills: 0    Associated Diagnoses: Postoperative pain      senna-docusate (SENOKOT-S/PERICOLACE) 8.6-50 MG tablet Take 1 tablet by mouth 2 times daily as needed for constipation  Qty: 20 tablet, Refills: 0    Associated Diagnoses: Postoperative pain         CONTINUE these medications which have NOT CHANGED    Details   levothyroxine (SYNTHROID/LEVOTHROID) 125 MCG tablet Take 1 tablet (125 mcg) by mouth daily  Qty: 90  tablet, Refills: 3    Associated Diagnoses: Hypothyroidism, unspecified type      Prenatal Vit-Fe Fumarate-FA (PRENATAL MULTIVITAMIN W/IRON) 27-0.8 MG tablet Take 1 tablet by mouth daily  Qty: 90 tablet, Refills: 3    Associated Diagnoses: Prenatal care, subsequent pregnancy in first trimester      acyclovir (ZOVIRAX) 800 MG tablet Take 1 tablet (800 mg) by mouth 5 times daily  Qty: 50 tablet, Refills: 3    Associated Diagnoses: Recurrent cold sores      alcohol swab prep pads Use to swab area of injection/martir as directed.  Qty: 200 each, Refills: 11    Associated Diagnoses: Gestational diabetes mellitus (GDM) in third trimester, gestational diabetes method of control unspecified      blood glucose (NO BRAND SPECIFIED) lancing device Device to be used with lancets.  Qty: 1 each, Refills: 0    Associated Diagnoses: Gestational diabetes mellitus (GDM) in third trimester, gestational diabetes method of control unspecified      blood glucose (NO BRAND SPECIFIED) test strip Use to test blood sugar 4 times daily or as directed. To accompany: Blood Glucose Monitor Brands: AccuChek Guide strips.  Qty: 100 strip, Refills: 6    Associated Diagnoses: Gestational diabetes mellitus (GDM) in third trimester, gestational diabetes method of control unspecified      blood glucose calibration (NO BRAND SPECIFIED) solution To accompany: Blood Glucose Monitor Brands: per insurance.  Qty: 1 each, Refills: 0    Associated Diagnoses: Gestational diabetes mellitus (GDM) in third trimester, gestational diabetes method of control unspecified      blood glucose monitoring (NO BRAND SPECIFIED) meter device kit Use to test blood sugar 4 times daily or as directed. Preferred blood glucose meter OR supplies to accompany: Blood Glucose Monitor Brands: per insurance.  Qty: 1 kit, Refills: 0    Associated Diagnoses: Gestational diabetes mellitus (GDM) in third trimester, gestational diabetes method of control unspecified      fluticasone (FLONASE)  50 MCG/ACT nasal spray Spray 2 sprays into both nostrils daily  Qty: 18.2 mL, Refills: 11    Associated Diagnoses: Seasonal allergic rhinitis, unspecified trigger      folic acid (FOLVITE) 1 MG tablet Take 1 tablet (1 mg) by mouth daily  Qty: 100 tablet, Refills: 3    Associated Diagnoses: Health care maintenance      thin (NO BRAND SPECIFIED) lancets Use with lanceting device. To accompany: Blood Glucose Monitor Brands: per insurance.  Qty: 200 each, Refills: 6    Associated Diagnoses: Gestational diabetes mellitus (GDM) in third trimester, gestational diabetes method of control unspecified           Allergies   Allergies   Allergen Reactions     Amoxicillin Hives     Childhood

## 2021-10-13 NOTE — PROGRESS NOTES
[facility]    Obstetrics Post-Op / Progress Note    Assessment & Plan   Assessment:  -2 Days Post-Op  Procedure(s):   SECTION    Doing well.  Clean wound without signs of infection.  Pain well-controlled.    Plan:  Discharge later today    Omar Flower     Interval History   Doing well.  Pain is well-controlled.  No fevers.  No history of wound drainage, warmth or significant erythema.  Good appetite.  Denies chest pain, shortness of breath, nausea or vomiting.  Ambulatory.  Breastfeeding well.    Medications     oxytocin in 0.9% NaCl 100 mL/hr (10/11/21 1046)     oxytocin in 0.9% NaCl         acetaminophen  975 mg Oral Q6H     ibuprofen  800 mg Oral Q6H     levothyroxine  125 mcg Oral QAM AC     Measles, Mumps & Rubella Vac  0.5 mL Subcutaneous Once     senna-docusate  1 tablet Oral BID    Or     senna-docusate  2 tablet Oral BID     Tdap (tetanus-diptheria-acell pertussis)  0.5 mL Intramuscular Once       Physical Exam   Temp: 97.7  F (36.5  C) Temp src: Temporal BP: 103/56 Pulse: 66   Resp: 16 SpO2: 97 % O2 Device: None (Room air)    There were no vitals filed for this visit.  Vital Signs with Ranges  Temp:  [97  F (36.1  C)-97.7  F (36.5  C)] 97.7  F (36.5  C)  Pulse:  [66-67] 66  Resp:  [16] 16  BP: (103-109)/(56-79) 103/56  SpO2:  [97 %] 97 %  I/O last 3 completed shifts:  In: 360 [P.O.:360]  Out: 900 [Urine:900]    Uterine fundus is firm, non-tender and at the level of the umbilicus  Incision C/D/I  Extremities Non-tender    Data   Recent Labs   Lab Test 10/11/21  0627 03/05/21  164   ABO  --  O   RH  --  Pos   AS Negative Neg     Recent Labs   Lab Test 10/12/21  0658 10/11/21  0627   HGB 11.3* 12.8     Recent Labs   Lab Test 21  164   RUQIGG 14

## 2021-10-13 NOTE — PROGRESS NOTES
[facility] DISCHARGE NOTE    Patient discharged to home at 11:08 AM via ambulation. Accompanied by spouse and staff. Discharge instructions reviewed with patient, opportunity offered to ask questions. Prescriptions sent to patients preferred pharmacy. All belongings sent with patient.    Asmita Narvaez RN

## 2021-10-13 NOTE — PLAN OF CARE
/56   Pulse 66   Temp 97.7  F (36.5  C) (Temporal)   Resp 16   LMP 01/10/2021 (Exact Date)   SpO2 97%   Breastfeeding Unknown     Patient attentive to infant needs. Pain controlled with PRN and scheduled pain medications. Patient c/o cramping, heat packs also in use. Fundus firm, bleeding light with no clots noted at this time. Up independently in the room, activity encouraged as well as use of incentive spirometer. Breast feeding infant independently. Education provided to patient on use of Biliblanket for infant. Denies further needs at this time.

## 2021-10-14 NOTE — DISCHARGE SUMMARY
[facility]     Discharge Summary  Obstetrics     Date of Admission:  10/11/2021  Date of Discharge:  10/13/2021  Discharging Provider: Omar Flower        Discharge Diagnoses     History of  [Z98.891]         Patient Active Problem List   Diagnosis     Ganglion cyst     Recurrent cold sores     Hypothyroidism, unspecified type     Chronic seasonal allergic rhinitis, unspecified trigger     Encounter for contraceptive management, unspecified type     Pregnancy, unspecified gestational age     Gestational diabetes mellitus (GDM) in third trimester, gestational diabetes method of control unspecified     Encounter for triage in pregnant patient     Pregnancy     History of  section     Melanoma in situ of left lower leg (H)     Abnormal ultrasonic finding on  screening of mother     Diet controlled gestational diabetes mellitus (GDM) in third trimester     History of                Procedure/Surgery Information     Procedure: Procedure(s):   SECTION   Surgeon(s): Surgeon(s) and Role:     * Omar Flower MD - Primary            History of Present Illness     Amanda Mckeon is a 30 year old female who presented with repeat  section           Hospital Course     The patient's hospital course was unremarkable.  She recovered as anticipated and experienced no post-operative complications.  On discharge, her pain was well controlled. Vaginal bleeding is similar to peak menstrual flow.  Voiding without difficulty.  Ambulating well and tolerating a normal diet.  No fever or significant wound drainage.  Breastfeeding well.  Infant is stable.  She was discharged on post-partum day #3.     Post-partum hemoglobin:         Hemoglobin   Date Value Ref Range Status   10/12/2021 11.3 (L) 11.7 - 15.7 g/dL Final   2021 12.2 11.7 - 15.7 g/dL Final         Omar Flower MD           Discharge Disposition      Discharged to home   Condition at discharge:  Stable           Pending Results     Final pathology results: pending  These results will be followed up by Omar Flower MD     Unresulted Labs Ordered in the Past 30 Days of this Admission      Date and Time Order Name Status Description     10/11/2021  8:33 AM Surgical Pathology Exam In process                     Primary Care Physician      Liza Ordoñez           Consultations This Hospital Stay      HOME CARE POST PARTUM/ IP CONSULT  LACTATION IP CONSULT           Discharge Orders             Activity     Activity as tolerated          Reason for your hospital stay     Maternity care          Follow Up     Follow up with provider in 2 weeks and 6 weeks for post-delivery checks          Breast pump     Breast Pump Documentation:  Manual/Electric Pump: To support adequate breast milk production and nutrition for infant.      I, the undersigned, certify that the above prescribed supplies are medically necessary for this patient and is both reasonable and necessary in reference to accepted standards of medical and necessary in reference to accepted standards of medical practice in the treatment of this patient's condition and is not prescribed as a convenience.          Diet     Resume previous diet            Discharge Medications          Current Discharge Medication List            START taking these medications     Details   ibuprofen (ADVIL/MOTRIN) 600 MG tablet Take 1 tablet (600 mg) by mouth every 6 hours as needed for moderate pain  Qty: 30 tablet, Refills: 0     Associated Diagnoses: Postoperative pain       oxyCODONE (ROXICODONE) 5 MG tablet Take 1 tablet (5 mg) by mouth every 4 hours as needed for pain  Qty: 30 tablet, Refills: 0     Associated Diagnoses: Postoperative pain       senna-docusate (SENOKOT-S/PERICOLACE) 8.6-50 MG tablet Take 1 tablet by mouth 2 times daily as needed for constipation  Qty: 20 tablet, Refills: 0     Associated Diagnoses: Postoperative pain                 CONTINUE these medications which have NOT CHANGED     Details   levothyroxine (SYNTHROID/LEVOTHROID) 125 MCG tablet Take 1 tablet (125 mcg) by mouth daily  Qty: 90 tablet, Refills: 3     Associated Diagnoses: Hypothyroidism, unspecified type       Prenatal Vit-Fe Fumarate-FA (PRENATAL MULTIVITAMIN W/IRON) 27-0.8 MG tablet Take 1 tablet by mouth daily  Qty: 90 tablet, Refills: 3     Associated Diagnoses: Prenatal care, subsequent pregnancy in first trimester       acyclovir (ZOVIRAX) 800 MG tablet Take 1 tablet (800 mg) by mouth 5 times daily  Qty: 50 tablet, Refills: 3     Associated Diagnoses: Recurrent cold sores       alcohol swab prep pads Use to swab area of injection/martir as directed.  Qty: 200 each, Refills: 11     Associated Diagnoses: Gestational diabetes mellitus (GDM) in third trimester, gestational diabetes method of control unspecified       blood glucose (NO BRAND SPECIFIED) lancing device Device to be used with lancets.  Qty: 1 each, Refills: 0     Associated Diagnoses: Gestational diabetes mellitus (GDM) in third trimester, gestational diabetes method of control unspecified       blood glucose (NO BRAND SPECIFIED) test strip Use to test blood sugar 4 times daily or as directed. To accompany: Blood Glucose Monitor Brands: AccuChek Guide strips.  Qty: 100 strip, Refills: 6     Associated Diagnoses: Gestational diabetes mellitus (GDM) in third trimester, gestational diabetes method of control unspecified       blood glucose calibration (NO BRAND SPECIFIED) solution To accompany: Blood Glucose Monitor Brands: per insurance.  Qty: 1 each, Refills: 0     Associated Diagnoses: Gestational diabetes mellitus (GDM) in third trimester, gestational diabetes method of control unspecified       blood glucose monitoring (NO BRAND SPECIFIED) meter device kit Use to test blood sugar 4 times daily or as directed. Preferred blood glucose meter OR supplies to accompany: Blood Glucose Monitor Brands: per  insurance.  Qty: 1 kit, Refills: 0     Associated Diagnoses: Gestational diabetes mellitus (GDM) in third trimester, gestational diabetes method of control unspecified       fluticasone (FLONASE) 50 MCG/ACT nasal spray Spray 2 sprays into both nostrils daily  Qty: 18.2 mL, Refills: 11     Associated Diagnoses: Seasonal allergic rhinitis, unspecified trigger       folic acid (FOLVITE) 1 MG tablet Take 1 tablet (1 mg) by mouth daily  Qty: 100 tablet, Refills: 3     Associated Diagnoses: Health care maintenance       thin (NO BRAND SPECIFIED) lancets Use with lanceting device. To accompany: Blood Glucose Monitor Brands: per insurance.  Qty: 200 each, Refills: 6     Associated Diagnoses: Gestational diabetes mellitus (GDM) in third trimester, gestational diabetes method of control unspecified                    Allergies            Allergies   Allergen Reactions     Amoxicillin Hives       Childhood

## 2021-10-15 ENCOUNTER — OFFICE VISIT (OUTPATIENT)
Dept: FAMILY MEDICINE | Facility: OTHER | Age: 31
End: 2021-10-15
Attending: FAMILY MEDICINE
Payer: COMMERCIAL

## 2021-10-15 VITALS
SYSTOLIC BLOOD PRESSURE: 116 MMHG | DIASTOLIC BLOOD PRESSURE: 76 MMHG | HEART RATE: 89 BPM | RESPIRATION RATE: 16 BRPM | WEIGHT: 195.8 LBS | OXYGEN SATURATION: 98 % | TEMPERATURE: 97.3 F | BODY MASS INDEX: 33.09 KG/M2

## 2021-10-15 DIAGNOSIS — N64.4 BREAST PAIN: ICD-10-CM

## 2021-10-15 PROCEDURE — 99213 OFFICE O/P EST LOW 20 MIN: CPT | Performed by: FAMILY MEDICINE

## 2021-10-15 RX ORDER — CEPHALEXIN 500 MG/1
500 CAPSULE ORAL 4 TIMES DAILY
Qty: 40 CAPSULE | Refills: 0 | Status: SHIPPED | OUTPATIENT
Start: 2021-10-15 | End: 2021-10-25

## 2021-10-15 ASSESSMENT — EDINBURGH POSTNATAL DEPRESSION SCALE (EPDS)
THE THOUGHT OF HARMING MYSELF HAS OCCURRED TO ME: NEVER
TOTAL SCORE: 0
I HAVE BEEN ANXIOUS OR WORRIED FOR NO GOOD REASON: NO, NOT AT ALL
I HAVE BEEN SO UNHAPPY THAT I HAVE HAD DIFFICULTY SLEEPING: NOT AT ALL
I HAVE BEEN SO UNHAPPY THAT I HAVE BEEN CRYING: NO, NEVER
I HAVE LOOKED FORWARD WITH ENJOYMENT TO THINGS: AS MUCH AS I EVER DID
I HAVE BLAMED MYSELF UNNECESSARILY WHEN THINGS WENT WRONG: NO, NEVER
THINGS HAVE BEEN GETTING ON TOP OF ME: NO, I HAVE BEEN COPING AS WELL AS EVER
I HAVE FELT SAD OR MISERABLE: NO, NOT AT ALL
I HAVE FELT SCARED OR PANICKY FOR NO GOOD REASON: NO, NOT AT ALL
I HAVE BEEN ABLE TO LAUGH AND SEE THE FUNNY SIDE OF THINGS: AS MUCH AS I ALWAYS COULD

## 2021-10-15 ASSESSMENT — PAIN SCALES - GENERAL: PAINLEVEL: MILD PAIN (2)

## 2021-10-15 NOTE — PROGRESS NOTES
"  SUBJECTIVE:   Nursing Notes:   Analia Wharton LPN  10/15/2021  4:24 PM  Sign at exiting of workspace  Chief Complaint   Patient presents with     Lactation Consult       Initial /76   Pulse 89   Temp 97.3  F (36.3  C) (Temporal)   Resp 16   Wt 88.8 kg (195 lb 12.8 oz)   LMP 01/10/2021 (Exact Date)   SpO2 98%   Breastfeeding Yes   BMI 33.09 kg/m   Estimated body mass index is 33.09 kg/m  as calculated from the following:    Height as of 21: 1.638 m (5' 4.5\").    Weight as of this encounter: 88.8 kg (195 lb 12.8 oz).  Medication Reconciliation: complete    FOOD SECURITY SCREENING QUESTIONS  Hunger Vital Signs:  Within the past 12 months we worried whether our food would run out before we got money to buy more. Never  Within the past 12 months the food we bought just didn't last and we didn't have money to get more. Never    Analia Wharton LPN      Amanda Mckeon is a 30 year old female who presents to clinic today for a lactation visit.  She delivered by elective repeat  4 days ago.  She has been breast feeding and supplementing with formula.  She has had a prior breast reduction.  She had trouble making enough breast milk with her last baby.  She has noticed in the past day or so that she has had increased swelling and pain in the upper outer quadrant of her right breast.  She has not had any redness, fever/chills or other systemic symptoms.  Baby is eating at least every 2-3 hours.  She is voiding and stooling appropriately.  Amanda has noted some axillary lymphadenopathy since her engorgement symptoms worsened.    HPI    I personally reviewed medications/allergies/history listed below:    Patient Active Problem List    Diagnosis Date Noted     History of  10/11/2021     Priority: Medium     Abnormal ultrasonic finding on  screening of mother 2021     Priority: Medium     Diet controlled gestational diabetes mellitus (GDM) in third trimester 2021     " Priority: Medium     Melanoma in situ of left lower leg (H) 2021     Priority: Medium     History of  section 2019     Priority: Medium     Pregnancy 2019     Priority: Medium     Encounter for triage in pregnant patient 2019     Priority: Medium     Gestational diabetes mellitus (GDM) in third trimester, gestational diabetes method of control unspecified 2019     Priority: Medium     Pregnancy, unspecified gestational age 2018     Priority: Medium     Hypothyroidism, unspecified type 08/10/2018     Priority: Medium     Chronic seasonal allergic rhinitis, unspecified trigger 08/10/2018     Priority: Medium     Encounter for contraceptive management, unspecified type 08/10/2018     Priority: Medium     Ganglion cyst 2017     Priority: Medium     Recurrent cold sores 2016     Priority: Medium     Past Medical History:   Diagnosis Date     Cyst of right ovary     2016     Diet controlled gestational diabetes mellitus (GDM) in third trimester      Herpesviral vesicular dermatitis     2016     Hypothyroidism     2015     Melanoma of skin (H) 2021      Past Surgical History:   Procedure Laterality Date     ADENOIDECTOMY      as a child     AS SHOULDER ARTHROSCOPY, DX Right     Dr. Bolivar - age 12      SECTION N/A 8/3/2019    Procedure:  SECTION;  Surgeon: Omar Flower MD;  Location:  OR      SECTION N/A 10/11/2021    Procedure:  SECTION;  Surgeon: Omar Flower MD;  Location:  OR     MAMMOPLASTY REDUCTION           PE TUBES      as a child     Family History   Problem Relation Age of Onset     Family History Negative Mother      Lupus Father      Family History Negative Brother      Kidney Disease Maternal Grandmother      Bladder Cancer Maternal Grandfather      Family History Negative Paternal Grandmother      Diabetes Paternal Grandfather      Social History     Tobacco Use     Smoking status: Never  Smoker     Smokeless tobacco: Never Used   Substance Use Topics     Alcohol use: No     Social History     Social History Narrative    .  Works as an .         - Andrew - works for [employer] -  at [employer].     Current Outpatient Medications   Medication Sig Dispense Refill     acyclovir (ZOVIRAX) 800 MG tablet Take 1 tablet (800 mg) by mouth 5 times daily 50 tablet 3     alcohol swab prep pads Use to swab area of injection/martir as directed. 200 each 11     blood glucose (NO BRAND SPECIFIED) lancing device Device to be used with lancets. 1 each 0     blood glucose (NO BRAND SPECIFIED) test strip Use to test blood sugar 4 times daily or as directed. To accompany: Blood Glucose Monitor Brands: AccuChek Guide strips. 100 strip 6     blood glucose calibration (NO BRAND SPECIFIED) solution To accompany: Blood Glucose Monitor Brands: per insurance. 1 each 0     blood glucose monitoring (NO BRAND SPECIFIED) meter device kit Use to test blood sugar 4 times daily or as directed. Preferred blood glucose meter OR supplies to accompany: Blood Glucose Monitor Brands: per insurance. 1 kit 0     cephALEXin (KEFLEX) 500 MG capsule Take 1 capsule (500 mg) by mouth 4 times daily for 10 days 40 capsule 0     fluticasone (FLONASE) 50 MCG/ACT nasal spray Spray 2 sprays into both nostrils daily (Patient taking differently: Spray 2 sprays into both nostrils daily as needed ) 18.2 mL 11     folic acid (FOLVITE) 1 MG tablet Take 1 tablet (1 mg) by mouth daily 100 tablet 3     ibuprofen (ADVIL/MOTRIN) 600 MG tablet Take 1 tablet (600 mg) by mouth every 6 hours as needed for moderate pain 30 tablet 0     levothyroxine (SYNTHROID/LEVOTHROID) 125 MCG tablet Take 1 tablet (125 mcg) by mouth daily 90 tablet 3     oxyCODONE (ROXICODONE) 5 MG tablet Take 1 tablet (5 mg) by mouth every 4 hours as needed for pain 30 tablet 0     Prenatal Vit-Fe Fumarate-FA (PRENATAL MULTIVITAMIN W/IRON) 27-0.8 MG tablet  Take 1 tablet by mouth daily 90 tablet 3     senna-docusate (SENOKOT-S/PERICOLACE) 8.6-50 MG tablet Take 1 tablet by mouth 2 times daily as needed for constipation 20 tablet 0     thin (NO BRAND SPECIFIED) lancets Use with lanceting device. To accompany: Blood Glucose Monitor Brands: per insurance. 200 each 6     Allergies   Allergen Reactions     Amoxicillin Hives     Childhood       Review of Systems   Constitutional: Negative for chills and fever.   Respiratory: Negative for cough and shortness of breath.    Breasts:  Positive for tenderness. Negative for breast mass.   Psychiatric/Behavioral: Negative for mood changes. The patient is not hyperactive.         OBJECTIVE:     /76   Pulse 89   Temp 97.3  F (36.3  C) (Temporal)   Resp 16   Wt 88.8 kg (195 lb 12.8 oz)   LMP 01/10/2021 (Exact Date)   SpO2 98%   Breastfeeding Yes   BMI 33.09 kg/m    Body mass index is 33.09 kg/m .  Physical Exam  Constitutional:       Appearance: Normal appearance.   HENT:      Head: Normocephalic.   Cardiovascular:      Rate and Rhythm: Normal rate and regular rhythm.   Pulmonary:      Effort: Pulmonary effort is normal.      Breath sounds: Normal breath sounds. No wheezing.      Comments: Right breast - engorgement of veins of upper outer quadrant with induration, but no redness or warmth.    Axillary lymphadenopathy noted, right >left.  Neurological:      Mental Status: She is alert.           PHQ-9 SCORE 2/15/2021 9/24/2021 10/7/2021   PHQ-9 Total Score MyChart - - 0   PHQ-9 Total Score 0 0 0       PHQ-2 Score:     PHQ-2 ( 1999 Pf[employer]) 10/15/2021 10/7/2021   Q1: Little interest or pleasure in doing things 0 0   Q2: Feeling down, depressed or hopeless 0 0   PHQ-2 Score 0 0       SANDY-7 SCORE 9/16/2019 9/24/2021 10/7/2021   Total Score - - 0 (minimal anxiety)   Total Score 0 0 0         No flowsheet data found.      I personally reviewed results withpatient as listed below:   Diagnostic Test Results:  none      ASSESSMENT/PLAN:       ICD-10-CM    1. Encounter for lactation counseling  Z39.1    2. Breast pain  N64.4 cephALEXin (KEFLEX) 500 MG capsule       1.  She has had prior breast reduction surgery, which likely will make breast feeding more challenging and increases risks for mastitis.  Encouraged her to apply warm compresses when feeding and pumping on the right side.  Likely has plugged milk ducts on this side.  Due to surgery, this may be impeding outlet of milk.  Discussed that if she develops increased pain, redness, fever or otherwise is suspicious of mastitis, would recommend filling prescription for keflex that was given.  She wishes to keep supplementing with formula to ensure baby is thriving, which is certainly fine.  Suspect the lymphadenopathy is related to engorgement.  Discussed if this is worsening, she could consider taking the keflex as well.  Follow up as needed.  2.  See #1.    Liza Ordoñez MD  [facility] AND [facility]

## 2021-10-15 NOTE — NURSING NOTE
"Chief Complaint   Patient presents with     Lactation Consult       Initial /76   Pulse 89   Temp 97.3  F (36.3  C) (Temporal)   Resp 16   Wt 88.8 kg (195 lb 12.8 oz)   LMP 01/10/2021 (Exact Date)   SpO2 98%   Breastfeeding Yes   BMI 33.09 kg/m   Estimated body mass index is 33.09 kg/m  as calculated from the following:    Height as of 9/24/21: 1.638 m (5' 4.5\").    Weight as of this encounter: 88.8 kg (195 lb 12.8 oz).  Medication Reconciliation: complete    FOOD SECURITY SCREENING QUESTIONS  Hunger Vital Signs:  Within the past 12 months we worried whether our food would run out before we got money to buy more. Never  Within the past 12 months the food we bought just didn't last and we didn't have money to get more. Never    Analia Wharton, MARTHA  "

## 2021-10-16 ASSESSMENT — ENCOUNTER SYMPTOMS
HYPERACTIVE: 0
FEVER: 0
SHORTNESS OF BREATH: 0
CHILLS: 0
BREAST MASS: 0
COUGH: 0

## 2021-10-22 ENCOUNTER — LAB (OUTPATIENT)
Dept: LAB | Facility: OTHER | Age: 31
End: 2021-10-22
Attending: OBSTETRICS & GYNECOLOGY
Payer: COMMERCIAL

## 2021-10-22 ENCOUNTER — OFFICE VISIT (OUTPATIENT)
Dept: OBGYN | Facility: OTHER | Age: 31
End: 2021-10-22
Attending: OBSTETRICS & GYNECOLOGY
Payer: COMMERCIAL

## 2021-10-22 VITALS
RESPIRATION RATE: 16 BRPM | OXYGEN SATURATION: 97 % | HEART RATE: 71 BPM | SYSTOLIC BLOOD PRESSURE: 118 MMHG | WEIGHT: 192.9 LBS | BODY MASS INDEX: 32.6 KG/M2 | DIASTOLIC BLOOD PRESSURE: 80 MMHG

## 2021-10-22 DIAGNOSIS — Z3A.01 LESS THAN 8 WEEKS GESTATION OF PREGNANCY: ICD-10-CM

## 2021-10-22 DIAGNOSIS — R30.0 DYSURIA: Primary | ICD-10-CM

## 2021-10-22 LAB
ALBUMIN UR-MCNC: 10 MG/DL
APPEARANCE UR: CLEAR
BACTERIA #/AREA URNS HPF: ABNORMAL /HPF
BILIRUB UR QL STRIP: NEGATIVE
COLOR UR AUTO: ABNORMAL
GLUCOSE UR STRIP-MCNC: NEGATIVE MG/DL
HGB UR QL STRIP: ABNORMAL
KETONES UR STRIP-MCNC: NEGATIVE MG/DL
LEUKOCYTE ESTERASE UR QL STRIP: ABNORMAL
MUCOUS THREADS #/AREA URNS LPF: PRESENT /LPF
NITRATE UR QL: NEGATIVE
PH UR STRIP: 6 [PH] (ref 5–9)
RBC URINE: 27 /HPF
SP GR UR STRIP: 1.02 (ref 1–1.03)
SQUAMOUS EPITHELIAL: 3 /HPF
UROBILINOGEN UR STRIP-MCNC: NORMAL MG/DL
WBC URINE: 17 /HPF

## 2021-10-22 PROCEDURE — 81001 URINALYSIS AUTO W/SCOPE: CPT | Mod: ZL | Performed by: OBSTETRICS & GYNECOLOGY

## 2021-10-22 PROCEDURE — 87086 URINE CULTURE/COLONY COUNT: CPT | Mod: ZL | Performed by: OBSTETRICS & GYNECOLOGY

## 2021-10-22 PROCEDURE — 99024 POSTOP FOLLOW-UP VISIT: CPT | Performed by: OBSTETRICS & GYNECOLOGY

## 2021-10-22 RX ORDER — NITROFURANTOIN 25; 75 MG/1; MG/1
100 CAPSULE ORAL 2 TIMES DAILY
Qty: 6 CAPSULE | Refills: 0 | Status: SHIPPED | OUTPATIENT
Start: 2021-10-22 | End: 2021-10-25

## 2021-10-22 ASSESSMENT — PAIN SCALES - GENERAL: PAINLEVEL: MILD PAIN (2)

## 2021-10-22 NOTE — PROGRESS NOTES
Amanda Mckeon presents todayfor a postop checkup at 2 week(s) after     S: Bowels and bladder are functioning normally, no abnormal bleeding or pain. Notices a scant ooze from the left side of her incision.    Current Outpatient Medications   Medication     acyclovir (ZOVIRAX) 800 MG tablet     alcohol swab prep pads     blood glucose (NO BRAND SPECIFIED) lancing device     blood glucose (NO BRAND SPECIFIED) test strip     blood glucose calibration (NO BRAND SPECIFIED) solution     blood glucose monitoring (NO BRAND SPECIFIED) meter device kit     cephALEXin (KEFLEX) 500 MG capsule     fluticasone (FLONASE) 50 MCG/ACT nasal spray     folic acid (FOLVITE) 1 MG tablet     ibuprofen (ADVIL/MOTRIN) 600 MG tablet     levothyroxine (SYNTHROID/LEVOTHROID) 125 MCG tablet     oxyCODONE (ROXICODONE) 5 MG tablet     Prenatal Vit-Fe Fumarate-FA (PRENATAL MULTIVITAMIN W/IRON) 27-0.8 MG tablet     senna-docusate (SENOKOT-S/PERICOLACE) 8.6-50 MG tablet     thin (NO BRAND SPECIFIED) lancets     No current facility-administered medications for this visit.     Allergies   Allergen Reactions     Amoxicillin Hives     Childhood     Past Medical History:   Diagnosis Date     Cyst of right ovary     2016     Diet controlled gestational diabetes mellitus (GDM) in third trimester      Herpesviral vesicular dermatitis     2016     Hypothyroidism     2015     Melanoma of skin (H) 2021     Past Surgical History:   Procedure Laterality Date     ADENOIDECTOMY      as a child     AS SHOULDER ARTHROSCOPY, DX Right     Dr. Bolivar - age 12      SECTION N/A 8/3/2019    Procedure:  SECTION;  Surgeon: Omar Flower MD;  Location:  OR      SECTION N/A 10/11/2021    Procedure:  SECTION;  Surgeon: Omar Flower MD;  Location: [facility] OR     MAMMOPLASTY REDUCTION      2007     PE TUBES      as a child       COMPLETE REVIEW OF SYSTEMS: Neg except as above        O: /80 (BP Location: Right  arm, Patient Position: Sitting, Cuff Size: Adult Regular)   Pulse 71   Resp 16   Wt 87.5 kg (192 lb 14.4 oz)   LMP 01/10/2021 (Exact Date)   SpO2 97%   Breastfeeding Yes   BMI 32.60 kg/m   Body mass index is 32.6 kg/m .    EXAM:  Incision is intact with a granuloma on the left side.    I/P:  Stable  PP visit in 4 weeks.    Omar Flower MD FACOG  1:05 PM 10/22/2021

## 2021-10-22 NOTE — NURSING NOTE
"Patient presents to the clinic today for 2 week postop.    Chief Complaint   Patient presents with     Surgical Followup     2 Week Post Op       Initial /80 (BP Location: Right arm, Patient Position: Sitting, Cuff Size: Adult Regular)   Pulse 71   Resp 16   Wt 87.5 kg (192 lb 14.4 oz)   LMP 01/10/2021 (Exact Date)   SpO2 97%   Breastfeeding Yes   BMI 32.60 kg/m   Estimated body mass index is 32.6 kg/m  as calculated from the following:    Height as of 9/24/21: 1.638 m (5' 4.5\").    Weight as of this encounter: 87.5 kg (192 lb 14.4 oz).  Medication Reconciliation: complete  Marina Chong LPN    FOOD SECURITY SCREENING QUESTIONS  Hunger Vital Signs:  Within the past 12 months we worried whether our food would run out before we got money to buy more. Never  Within the past 12 months the food we bought just didn't last and we didn't have money to get more. Never  Marina Chong LPN 10/22/2021 12:51 PM    "

## 2021-10-24 LAB — BACTERIA UR CULT: NORMAL

## 2021-10-26 ENCOUNTER — MEDICAL CORRESPONDENCE (OUTPATIENT)
Dept: HEALTH INFORMATION MANAGEMENT | Facility: OTHER | Age: 31
End: 2021-10-26

## 2021-11-08 ENCOUNTER — OFFICE VISIT (OUTPATIENT)
Dept: OBGYN | Facility: OTHER | Age: 31
End: 2021-11-08
Attending: OBSTETRICS & GYNECOLOGY
Payer: COMMERCIAL

## 2021-11-08 VITALS
BODY MASS INDEX: 32.04 KG/M2 | DIASTOLIC BLOOD PRESSURE: 70 MMHG | HEART RATE: 78 BPM | TEMPERATURE: 98.2 F | SYSTOLIC BLOOD PRESSURE: 112 MMHG | WEIGHT: 189.6 LBS

## 2021-11-08 DIAGNOSIS — Z98.890 POSTOPERATIVE STATE: Primary | ICD-10-CM

## 2021-11-08 PROCEDURE — 99024 POSTOP FOLLOW-UP VISIT: CPT | Performed by: OBSTETRICS & GYNECOLOGY

## 2021-11-08 NOTE — NURSING NOTE
Pt presents to clinic today for incision check. Pt has noticed red drainage from incision site.      Medication Reconciliation: complete  Claudette Donovan LPN

## 2021-11-08 NOTE — PROGRESS NOTES
S: Patient presents for evaluation of her  incision. It has been raised, red edges, bleeds a little, and oozy. She is using gauze to keep it dry under her panus. No fever or warmth over the incision.    O: /70   Pulse 78   Temp 98.2  F (36.8  C) (Tympanic)   Wt 86 kg (189 lb 9.6 oz)   LMP 01/10/2021 (Exact Date)   Breastfeeding Yes   BMI 32.04 kg/m      Abdomen: NT, ND, incision shows a raised area of granulation on 5 cm of the left part of the incision, no erythema or foul discharge.    I/P:  Incisional granuloma    Treated with silver nitrate.  Use H202 on it every other day.  Use bacitracin BID until it is healed over.    Omar Flower MD FACOG  3:06 PM 2021

## 2021-11-23 ENCOUNTER — PRENATAL OFFICE VISIT (OUTPATIENT)
Dept: OBGYN | Facility: OTHER | Age: 31
End: 2021-11-23
Attending: OBSTETRICS & GYNECOLOGY
Payer: COMMERCIAL

## 2021-11-23 VITALS
BODY MASS INDEX: 32.21 KG/M2 | HEART RATE: 66 BPM | SYSTOLIC BLOOD PRESSURE: 102 MMHG | DIASTOLIC BLOOD PRESSURE: 76 MMHG | WEIGHT: 190.6 LBS

## 2021-11-23 PROCEDURE — 99207 PR OB VISIT-NO CHARGE - GICH ONLY: CPT | Performed by: OBSTETRICS & GYNECOLOGY

## 2021-11-23 NOTE — PROGRESS NOTES
CC: postpartum exam at 6 weeks from delivery    HPI: Amanda Mckeon presents for postpartum exam. Baby was born by repeat  delivery. Complications included none.  Mood:OK    Breastfeeding: yes  Incision(s): left side has a 3 cm overlap with some inferior granulation  Bleeding: slight  Birthcontrol: planning Mirena    Past Medical History:   Diagnosis Date     Cyst of right ovary     2016     Diet controlled gestational diabetes mellitus (GDM) in third trimester      Herpesviral vesicular dermatitis     2016     Hypothyroidism     2015     Melanoma of skin (H) 2021     Past Surgical History:   Procedure Laterality Date     ADENOIDECTOMY      as a child     AS SHOULDER ARTHROSCOPY, DX Right     Dr. Bolivar - age 12      SECTION N/A 8/3/2019    Procedure:  SECTION;  Surgeon: Omar Flower MD;  Location: [facility] OR      SECTION N/A 10/11/2021    Procedure:  SECTION;  Surgeon: Omar Flower MD;  Location: [facility] OR     MAMMOPLASTY REDUCTION           PE TUBES      as a child     Current Outpatient Medications   Medication     acyclovir (ZOVIRAX) 800 MG tablet     fluticasone (FLONASE) 50 MCG/ACT nasal spray     folic acid (FOLVITE) 1 MG tablet     levothyroxine (SYNTHROID/LEVOTHROID) 125 MCG tablet     Prenatal Vit-Fe Fumarate-FA (PRENATAL MULTIVITAMIN W/IRON) 27-0.8 MG tablet     No current facility-administered medications for this visit.      Allergies   Allergen Reactions     Amoxicillin Hives     Childhood       REVIEW OF SYSTEMS:  Neg for bowel, bladder, and breast    O: /76   Pulse 66   Wt 86.5 kg (190 lb 9.6 oz)   LMP 01/10/2021 (Exact Date)   BMI 32.21 kg/m    Body mass index is 32.21 kg/m .    Exam:  Constitutional: healthy, alert, active and no distress  Pelvic: Normal BUSE, vulva appears normal, vaginal and cervix are normal. Uterus is normal size, shape position and mobility without adnexal mass or tenderness. Chaperone was  present.  Abdominal incision treated on the left lateral 3-4 cm with sliver nitrate      [place] Depression Scale  Thoughts of Harming Self: 0-->never  Total Score: 0        No results found for any visits on 11/23/21.      I/P:  Postpartum visit.    Resume annual preventive healthcare. Continue PNV's until done with childbearing.    F/u in two weeks for Mirena IUD insertion.    Omar Flower MD FACOG  2:10 PM 11/23/2021

## 2021-11-23 NOTE — NURSING NOTE
Pt presents to clinic today for post partum check. Pt state her incision is still leaking.      Medication Reconciliation: complete  Claudette Donovan LPN

## 2021-12-09 ENCOUNTER — OFFICE VISIT (OUTPATIENT)
Dept: OBGYN | Facility: OTHER | Age: 31
End: 2021-12-09
Attending: OBSTETRICS & GYNECOLOGY
Payer: COMMERCIAL

## 2021-12-09 VITALS
DIASTOLIC BLOOD PRESSURE: 70 MMHG | BODY MASS INDEX: 32.28 KG/M2 | SYSTOLIC BLOOD PRESSURE: 121 MMHG | HEART RATE: 78 BPM | WEIGHT: 191 LBS

## 2021-12-09 DIAGNOSIS — Z30.430 ENCOUNTER FOR IUD INSERTION: Primary | ICD-10-CM

## 2021-12-09 LAB — HCG UR QL: NEGATIVE

## 2021-12-09 PROCEDURE — 250N000011 HC RX IP 250 OP 636: Performed by: OBSTETRICS & GYNECOLOGY

## 2021-12-09 PROCEDURE — 81025 URINE PREGNANCY TEST: CPT | Mod: ZL | Performed by: OBSTETRICS & GYNECOLOGY

## 2021-12-09 PROCEDURE — 58300 INSERT INTRAUTERINE DEVICE: CPT | Performed by: OBSTETRICS & GYNECOLOGY

## 2021-12-09 RX ADMIN — LEVONORGESTREL 20 MCG: 52 INTRAUTERINE DEVICE INTRAUTERINE at 15:11

## 2021-12-09 ASSESSMENT — PAIN SCALES - GENERAL: PAINLEVEL: NO PAIN (0)

## 2021-12-09 NOTE — PROGRESS NOTES
S: patient requests Mirena IUD  O: /70   Pulse 78   Wt 86.6 kg (191 lb)   LMP  (LMP Unknown)   BMI 32.28 kg/m      Results for orders placed or performed in visit on 12/09/21   Pregnancy, Urine (HCG)     Status: Normal   Result Value Ref Range    hCG Urine Qualitative Negative Negative         IUD placement:    After consent was obtained and timeout performed, the patient was positioned in dorsal lithotomy. A speculum was inserted. The cervix was prepped with betadine. The cervix was grasped on the anterior lip and the uterus sounded to 8 cm. The  IUD was inserted to 8 cm without difficulty and easily deployed. The strings were trimmed to 2 cm. The cervix was released from the tenaculum. The cervix was made hemostatic with silver nitrate. The patient tolerated the procedure. No complications.      I/P: She was instructed to return in one month for a string check. Removal or replacement is recommended in 6 years from 12/9/2021. She is to call with signs of heavy bleeding, foul discharge, pain, or expulsion of the IUD, or inability to feel her strings.    Omar Flower MD FACOG  2:04 PM 12/9/2021

## 2021-12-09 NOTE — NURSING NOTE
"Chief Complaint   Patient presents with     IUD     IUD placement       Initial /70   Pulse 78   Wt 86.6 kg (191 lb)   LMP  (LMP Unknown)   BMI 32.28 kg/m   Estimated body mass index is 32.28 kg/m  as calculated from the following:    Height as of 9/24/21: 1.638 m (5' 4.5\").    Weight as of this encounter: 86.6 kg (191 lb).  Medication Reconciliation: complete    FOOD SECURITY SCREENING QUESTIONS  Hunger Vital Signs:  Within the past 12 months we worried whether our food would run out before we got money to buy more. Never  Within the past 12 months the food we bought just didn't last and we didn't have money to get more. Never  Shannan Marquez LPN 12/9/2021 2:02 PM       Prior to the start of the procedure and with procedural staff participation, I verbally confirmed the patient s identity using two indicators, relevant allergies, that the procedure was appropriate and matched the consent or emergent situation, and that the correct equipment/implants were available. Immediately prior to starting the procedure I conducted the Time Out with the procedural staff and re-confirmed the patient s name, procedure, and site/side. (The Joint Commission universal protocol was followed.)  Yes    Sedation (Moderate or Deep): None      Shannan Marquez LPN  "

## 2022-01-11 ENCOUNTER — OFFICE VISIT (OUTPATIENT)
Dept: OBGYN | Facility: OTHER | Age: 32
End: 2022-01-11
Attending: OBSTETRICS & GYNECOLOGY
Payer: COMMERCIAL

## 2022-01-11 VITALS
WEIGHT: 199 LBS | HEART RATE: 76 BPM | BODY MASS INDEX: 33.63 KG/M2 | DIASTOLIC BLOOD PRESSURE: 78 MMHG | SYSTOLIC BLOOD PRESSURE: 128 MMHG

## 2022-01-11 DIAGNOSIS — Z30.431 IUD CHECK UP: Primary | ICD-10-CM

## 2022-01-11 PROCEDURE — 99213 OFFICE O/P EST LOW 20 MIN: CPT | Performed by: OBSTETRICS & GYNECOLOGY

## 2022-01-11 ASSESSMENT — PAIN SCALES - GENERAL: PAINLEVEL: NO PAIN (0)

## 2022-01-11 NOTE — NURSING NOTE
"Chief Complaint   Patient presents with     IUD     IUD Check        Initial /78   Pulse 76   Wt 90.3 kg (199 lb)   BMI 33.63 kg/m   Estimated body mass index is 33.63 kg/m  as calculated from the following:    Height as of 9/24/21: 1.638 m (5' 4.5\").    Weight as of this encounter: 90.3 kg (199 lb).  Medication Reconciliation: complete    FOOD SECURITY SCREENING QUESTIONS  Hunger Vital Signs:  Within the past 12 months we worried whether our food would run out before we got money to buy more. Never  Within the past 12 months the food we bought just didn't last and we didn't have money to get more. Never  Shannan Marquez LPN 1/11/2022 2:03 PM           Shannan Marquez LPN  "

## 2022-01-11 NOTE — PROGRESS NOTES
S: IUD check  Patient has no concerns, mild spotting and cramping rarely.    O: /78   Pulse 76   Wt 90.3 kg (199 lb)   BMI 33.63 kg/m    Pelvic: normal external genitalia. Speculum initially failed to reveal IUD strings, normal cervix and vagina.  With Boseman forceps the strings were teased out to a cm beyond the os.    Bedside US suggests an IUD in appropriate location within the uterine fundus.    I/P  IUD check  Appears to be appropriate location    Recheck prn.    Omar Flower MD FACOG  5:32 PM 1/11/2022

## 2022-02-01 DIAGNOSIS — E03.9 HYPOTHYROIDISM, UNSPECIFIED TYPE: ICD-10-CM

## 2022-02-01 RX ORDER — LEVOTHYROXINE SODIUM 125 UG/1
TABLET ORAL
Qty: 90 TABLET | Refills: 2 | Status: SHIPPED | OUTPATIENT
Start: 2022-02-01 | End: 2022-10-06

## 2022-02-01 NOTE — TELEPHONE ENCOUNTER
"Rx approved per Prescription Refill Protocol requirements. Roseanne Prado RN on 2/1/2022 at 1:03 PM        Last Prescription Date: 1/21/21  Last Qty/Refills: 90 / 3  Last Office Visit: 10/15/21  Future Office Visit: none     Requested Prescriptions   Pending Prescriptions Disp Refills     levothyroxine (SYNTHROID/LEVOTHROID) 125 MCG tablet [Pharmacy Med Name: LEVOTHYROXINE 0.125MG (125MCG) TAB] 90 tablet 3     Sig: TAKE 1 TABLET(125 MCG) BY MOUTH DAILY       Thyroid Protocol Passed - 2/1/2022 12:07 PM        Passed - Patient is 12 years or older        Passed - Recent (12 mo) or future (30 days) visit within the authorizing provider's specialty     Patient has had an office visit with the authorizing provider or a provider within the authorizing providers department within the previous 12 mos or has a future within next 30 days. See \"Patient Info\" tab in inbasket, or \"Choose Columns\" in Meds & Orders section of the refill encounter.              Passed - Medication is active on med list        Passed - Normal TSH on file in past 12 months     Recent Labs   Lab Test 09/24/21  1640   TSH 0.67              Passed - No active pregnancy on record     If patient is pregnant or has had a positive pregnancy test, please check TSH.          Passed - No positive pregnancy test in past 12 months     If patient is pregnant or has had a positive pregnancy test, please check TSH.               "

## 2022-02-25 DIAGNOSIS — E03.9 HYPOTHYROIDISM, UNSPECIFIED TYPE: Primary | ICD-10-CM

## 2022-03-04 ENCOUNTER — LAB (OUTPATIENT)
Dept: LAB | Facility: OTHER | Age: 32
End: 2022-03-04
Attending: FAMILY MEDICINE
Payer: COMMERCIAL

## 2022-03-04 ENCOUNTER — MYC MEDICAL ADVICE (OUTPATIENT)
Dept: FAMILY MEDICINE | Facility: OTHER | Age: 32
End: 2022-03-04

## 2022-03-04 DIAGNOSIS — E03.9 HYPOTHYROIDISM, UNSPECIFIED TYPE: ICD-10-CM

## 2022-03-04 LAB — TSH SERPL DL<=0.005 MIU/L-ACNC: 0.57 MU/L (ref 0.4–4)

## 2022-03-04 PROCEDURE — 36415 COLL VENOUS BLD VENIPUNCTURE: CPT | Mod: ZL

## 2022-03-04 PROCEDURE — 84443 ASSAY THYROID STIM HORMONE: CPT | Mod: ZL

## 2022-03-26 ENCOUNTER — HEALTH MAINTENANCE LETTER (OUTPATIENT)
Age: 32
End: 2022-03-26

## 2022-09-08 ENCOUNTER — MYC MEDICAL ADVICE (OUTPATIENT)
Dept: FAMILY MEDICINE | Facility: OTHER | Age: 32
End: 2022-09-08

## 2022-09-08 DIAGNOSIS — E03.9 HYPOTHYROIDISM, UNSPECIFIED TYPE: Primary | ICD-10-CM

## 2022-09-17 ENCOUNTER — HEALTH MAINTENANCE LETTER (OUTPATIENT)
Age: 32
End: 2022-09-17

## 2022-10-03 ENCOUNTER — MYC MEDICAL ADVICE (OUTPATIENT)
Dept: FAMILY MEDICINE | Facility: OTHER | Age: 32
End: 2022-10-03

## 2022-10-03 DIAGNOSIS — Z34.81 PRENATAL CARE, SUBSEQUENT PREGNANCY IN FIRST TRIMESTER: ICD-10-CM

## 2022-10-03 RX ORDER — PRENATAL VIT/IRON FUM/FOLIC AC 27MG-0.8MG
1 TABLET ORAL DAILY
Qty: 90 TABLET | Refills: 3 | Status: SHIPPED | OUTPATIENT
Start: 2022-10-03 | End: 2023-12-01

## 2022-10-04 DIAGNOSIS — E03.9 HYPOTHYROIDISM, UNSPECIFIED TYPE: ICD-10-CM

## 2022-10-06 RX ORDER — LEVOTHYROXINE SODIUM 125 UG/1
TABLET ORAL
Qty: 90 TABLET | Refills: 0 | Status: SHIPPED | OUTPATIENT
Start: 2022-10-06 | End: 2022-10-21

## 2022-10-06 NOTE — TELEPHONE ENCOUNTER
Routing refill request to provider for review/approval because:    LVO: 10/15/21  Randi Madrigal RN on 10/6/2022 at 3:36 PM

## 2022-10-21 ENCOUNTER — LAB (OUTPATIENT)
Dept: LAB | Facility: OTHER | Age: 32
End: 2022-10-21
Attending: FAMILY MEDICINE
Payer: COMMERCIAL

## 2022-10-21 DIAGNOSIS — E03.9 HYPOTHYROIDISM, UNSPECIFIED TYPE: ICD-10-CM

## 2022-10-21 LAB
T4 FREE SERPL-MCNC: 1 NG/DL (ref 0.6–1.6)
TSH SERPL DL<=0.005 MIU/L-ACNC: 4.49 MU/L (ref 0.4–4)

## 2022-10-21 PROCEDURE — 36415 COLL VENOUS BLD VENIPUNCTURE: CPT | Mod: ZL

## 2022-10-21 PROCEDURE — 84443 ASSAY THYROID STIM HORMONE: CPT | Mod: ZL

## 2022-10-21 PROCEDURE — 84439 ASSAY OF FREE THYROXINE: CPT | Mod: ZL

## 2022-10-21 RX ORDER — LEVOTHYROXINE SODIUM 137 UG/1
137 TABLET ORAL DAILY
Qty: 90 TABLET | Refills: 4 | Status: SHIPPED | OUTPATIENT
Start: 2022-10-21 | End: 2023-12-01

## 2022-12-01 ENCOUNTER — OFFICE VISIT (OUTPATIENT)
Dept: OBGYN | Facility: OTHER | Age: 32
End: 2022-12-01
Attending: OBSTETRICS & GYNECOLOGY
Payer: COMMERCIAL

## 2022-12-01 VITALS
SYSTOLIC BLOOD PRESSURE: 120 MMHG | WEIGHT: 207 LBS | BODY MASS INDEX: 34.98 KG/M2 | DIASTOLIC BLOOD PRESSURE: 72 MMHG | HEART RATE: 76 BPM

## 2022-12-01 DIAGNOSIS — Z30.431 IUD CHECK UP: Primary | ICD-10-CM

## 2022-12-01 PROCEDURE — 99213 OFFICE O/P EST LOW 20 MIN: CPT | Performed by: OBSTETRICS & GYNECOLOGY

## 2022-12-01 ASSESSMENT — PAIN SCALES - GENERAL: PAINLEVEL: NO PAIN (0)

## 2022-12-01 NOTE — NURSING NOTE
"Chief Complaint   Patient presents with     IUD     Placed 1/11/22     Patient here for IUD check. Patient here because she can not feel strings.   Initial /72   Pulse 76   Wt 93.9 kg (207 lb)   BMI 34.98 kg/m   Estimated body mass index is 34.98 kg/m  as calculated from the following:    Height as of 9/24/21: 1.638 m (5' 4.5\").    Weight as of this encounter: 93.9 kg (207 lb).  Medication Reconciliation: complete    FOOD SECURITY SCREENING QUESTIONS  Hunger Vital Signs:  Within the past 12 months we worried whether our food would run out before we got money to buy more. Never  Within the past 12 months the food we bought just didn't last and we didn't have money to get more. Never  Shannan Marquez LPN 12/1/2022 3:39 PM             Shannan Marquez LPN  "

## 2022-12-01 NOTE — PROGRESS NOTES
A/P: (Z30.431) IUD check up  (primary encounter diagnosis)  Comment:   Plan: Recheck in a year if unable to feel strings or with concerns.      S:IUD check, can't feel strings. No other concerns.  O: /72   Pulse 76   Wt 93.9 kg (207 lb)   BMI 34.98 kg/m      Exam:  Pelvic:  Normal vulva, vagina, and cervix   IUD strings initially not visible. Endobrush did not unfurl strings but was able to tease them out of the endocervix with Boseman packing forceps.

## 2022-12-27 ENCOUNTER — OFFICE VISIT (OUTPATIENT)
Dept: FAMILY MEDICINE | Facility: OTHER | Age: 32
End: 2022-12-27
Attending: FAMILY MEDICINE
Payer: COMMERCIAL

## 2022-12-27 VITALS
RESPIRATION RATE: 20 BRPM | OXYGEN SATURATION: 98 % | HEART RATE: 99 BPM | DIASTOLIC BLOOD PRESSURE: 62 MMHG | TEMPERATURE: 97.9 F | SYSTOLIC BLOOD PRESSURE: 126 MMHG

## 2022-12-27 DIAGNOSIS — R10.11 RUQ ABDOMINAL PAIN: ICD-10-CM

## 2022-12-27 DIAGNOSIS — R10.33 UMBILICAL PAIN: Primary | ICD-10-CM

## 2022-12-27 LAB
ALBUMIN SERPL BCG-MCNC: 4.9 G/DL (ref 3.5–5.2)
ALP SERPL-CCNC: 73 U/L (ref 35–104)
ALT SERPL W P-5'-P-CCNC: 20 U/L (ref 10–35)
ANION GAP SERPL CALCULATED.3IONS-SCNC: 11 MMOL/L (ref 7–15)
AST SERPL W P-5'-P-CCNC: 16 U/L (ref 10–35)
BASOPHILS # BLD AUTO: 0.1 10E3/UL (ref 0–0.2)
BASOPHILS NFR BLD AUTO: 1 %
BILIRUB SERPL-MCNC: 0.2 MG/DL
BUN SERPL-MCNC: 11.1 MG/DL (ref 6–20)
CALCIUM SERPL-MCNC: 9.8 MG/DL (ref 8.6–10)
CHLORIDE SERPL-SCNC: 102 MMOL/L (ref 98–107)
CREAT SERPL-MCNC: 0.69 MG/DL (ref 0.51–0.95)
DEPRECATED HCO3 PLAS-SCNC: 24 MMOL/L (ref 22–29)
EOSINOPHIL # BLD AUTO: 0.1 10E3/UL (ref 0–0.7)
EOSINOPHIL NFR BLD AUTO: 1 %
ERYTHROCYTE [DISTWIDTH] IN BLOOD BY AUTOMATED COUNT: 11.9 % (ref 10–15)
GFR SERPL CREATININE-BSD FRML MDRD: >90 ML/MIN/1.73M2
GLUCOSE SERPL-MCNC: 98 MG/DL (ref 70–99)
HCT VFR BLD AUTO: 39.8 % (ref 35–47)
HGB BLD-MCNC: 13.9 G/DL (ref 11.7–15.7)
IMM GRANULOCYTES # BLD: 0.2 10E3/UL
IMM GRANULOCYTES NFR BLD: 2 %
LIPASE SERPL-CCNC: 25 U/L (ref 13–60)
LYMPHOCYTES # BLD AUTO: 2.9 10E3/UL (ref 0.8–5.3)
LYMPHOCYTES NFR BLD AUTO: 25 %
MCH RBC QN AUTO: 30.1 PG (ref 26.5–33)
MCHC RBC AUTO-ENTMCNC: 34.9 G/DL (ref 31.5–36.5)
MCV RBC AUTO: 86 FL (ref 78–100)
MONOCYTES # BLD AUTO: 0.9 10E3/UL (ref 0–1.3)
MONOCYTES NFR BLD AUTO: 8 %
NEUTROPHILS # BLD AUTO: 7.4 10E3/UL (ref 1.6–8.3)
NEUTROPHILS NFR BLD AUTO: 63 %
NRBC # BLD AUTO: 0 10E3/UL
NRBC BLD AUTO-RTO: 0 /100
PLATELET # BLD AUTO: 381 10E3/UL (ref 150–450)
POTASSIUM SERPL-SCNC: 3.9 MMOL/L (ref 3.4–5.3)
PROT SERPL-MCNC: 7.6 G/DL (ref 6.4–8.3)
RBC # BLD AUTO: 4.62 10E6/UL (ref 3.8–5.2)
SODIUM SERPL-SCNC: 137 MMOL/L (ref 136–145)
WBC # BLD AUTO: 11.6 10E3/UL (ref 4–11)

## 2022-12-27 PROCEDURE — 83690 ASSAY OF LIPASE: CPT | Mod: ZL | Performed by: FAMILY MEDICINE

## 2022-12-27 PROCEDURE — 85049 AUTOMATED PLATELET COUNT: CPT | Mod: ZL | Performed by: FAMILY MEDICINE

## 2022-12-27 PROCEDURE — 36415 COLL VENOUS BLD VENIPUNCTURE: CPT | Mod: ZL | Performed by: FAMILY MEDICINE

## 2022-12-27 PROCEDURE — 85014 HEMATOCRIT: CPT | Mod: ZL | Performed by: FAMILY MEDICINE

## 2022-12-27 PROCEDURE — 80053 COMPREHEN METABOLIC PANEL: CPT | Mod: ZL | Performed by: FAMILY MEDICINE

## 2022-12-27 PROCEDURE — 99213 OFFICE O/P EST LOW 20 MIN: CPT | Performed by: FAMILY MEDICINE

## 2022-12-27 ASSESSMENT — ENCOUNTER SYMPTOMS
CHILLS: 0
SHORTNESS OF BREATH: 0
FEVER: 0
COUGH: 0
NERVOUS/ANXIOUS: 0

## 2022-12-27 ASSESSMENT — PAIN SCALES - GENERAL: PAINLEVEL: SEVERE PAIN (7)

## 2022-12-27 NOTE — PROGRESS NOTES
Shayne Patel is a 32 year old, presenting for the following health issues:  Pain (Lump inside top of naval and is painful. Started around September.)    Has a lump on her umbilicus.  Started this summer with building a garden fence.  Hurts to press on it.  Felt a pop once when she was pressing on it.  Hurts in the area to lift her kids.  She has also had a right upper quadrant pain from time to time which is accompanied by a feeling of bloating often.  Denies any diarrhea or other stool changes. No dysuria or frequency/urgency.  She has acid reflux from time to time, but it doesn't necessarily accompany this pain.    Musculoskeletal Problem  Pertinent negatives include no chills, coughing or fever.   History of Present Illness       Reason for visit:  Pain  Symptom onset:  More than a month  Symptoms include:  Pain  Symptom intensity:  Moderate  Symptom progression:  Staying the same  Had these symptoms before:  No  What makes it worse:  Lifting  What makes it better:  No    She eats 2-3 servings of fruits and vegetables daily.She consumes 0 sweetened beverage(s) daily.She exercises with enough effort to increase her heart rate 10 to 19 minutes per day.  She exercises with enough effort to increase her heart rate 4 days per week. She is missing 1 dose(s) of medications per week.             Review of Systems   Constitutional: Negative for chills and fever.   Respiratory: Negative for cough and shortness of breath.    Cardiovascular: Negative for peripheral edema.   Psychiatric/Behavioral: Negative for mood changes. The patient is not nervous/anxious.             Objective    /62 (BP Location: Right arm, Patient Position: Sitting, Cuff Size: Adult Large)   Pulse 99   Temp 97.9  F (36.6  C)   Resp 20   SpO2 98%   There is no height or weight on file to calculate BMI.  Physical Exam  Constitutional:       Appearance: Normal appearance.   HENT:      Head: Normocephalic.   Eyes:      Extraocular  Movements: Extraocular movements intact.      Pupils: Pupils are equal, round, and reactive to light.   Cardiovascular:      Rate and Rhythm: Normal rate and regular rhythm.      Heart sounds: Normal heart sounds. No murmur heard.  Pulmonary:      Effort: Pulmonary effort is normal.      Breath sounds: Normal breath sounds. No wheezing, rhonchi or rales.   Abdominal:      General: Abdomen is flat. There is no distension.      Palpations: Abdomen is soft. There is no mass.      Tenderness: There is no abdominal tenderness. There is no guarding or rebound.      Hernia: A hernia (very small hernia defect at umbilicus noted, less than 1 cm in size.) is present.   Musculoskeletal:      Cervical back: Normal range of motion and neck supple.      Right lower leg: No edema.      Left lower leg: No edema.   Lymphadenopathy:      Cervical: No cervical adenopathy.   Neurological:      Mental Status: She is alert.   Psychiatric:         Mood and Affect: Mood normal.         Behavior: Behavior normal.        Assessment & Plan   Amanda Mckeon is a 32 year old, presenting for the following health issues:      ICD-10-CM    1. Umbilical pain  R10.33       2. RUQ abdominal pain  R10.11 US Abdomen Complete     Comprehensive metabolic panel     CBC with Platelets & Differential     Lipase     Lipase     CBC with Platelets & Differential     Comprehensive metabolic panel        1.  I do think that she has an umbilical hernia, but it feels very small.  Discussed signs of incarceration as reasons to follow up.  Offered referral to general surgery to discuss repair, which she declines at this time.    2.  Labs drawn as above and also ordered ultrasound to evaluate further.  If pain is worsening and work up is normal, could consider CT &/or EGD for further work up.        Encouraged weight loss and regular exercise.     No follow-ups on file.    Liza Ordoñez MD  [facility]

## 2023-01-10 ENCOUNTER — HOSPITAL ENCOUNTER (OUTPATIENT)
Dept: ULTRASOUND IMAGING | Facility: OTHER | Age: 33
Discharge: HOME | End: 2023-01-10
Attending: FAMILY MEDICINE | Admitting: FAMILY MEDICINE
Payer: COMMERCIAL

## 2023-01-10 ENCOUNTER — MYC MEDICAL ADVICE (OUTPATIENT)
Dept: FAMILY MEDICINE | Facility: OTHER | Age: 33
End: 2023-01-10

## 2023-01-10 DIAGNOSIS — R10.11 RUQ ABDOMINAL PAIN: ICD-10-CM

## 2023-01-10 PROCEDURE — 76705 ECHO EXAM OF ABDOMEN: CPT

## 2023-01-15 ENCOUNTER — MYC MEDICAL ADVICE (OUTPATIENT)
Dept: FAMILY MEDICINE | Facility: OTHER | Age: 33
End: 2023-01-15
Payer: COMMERCIAL

## 2023-01-15 DIAGNOSIS — R10.11 RUQ ABDOMINAL PAIN: Primary | ICD-10-CM

## 2023-01-15 DIAGNOSIS — R93.2 ABNORMAL LIVER ULTRASOUND: ICD-10-CM

## 2023-01-15 DIAGNOSIS — F41.9 ANXIETY: ICD-10-CM

## 2023-01-15 DIAGNOSIS — Z01.812 PRE-PROCEDURE LAB EXAM: ICD-10-CM

## 2023-01-23 RX ORDER — ALPRAZOLAM 0.5 MG
TABLET ORAL
Qty: 2 TABLET | Refills: 0 | Status: SHIPPED | OUTPATIENT
Start: 2023-01-23 | End: 2023-02-02

## 2023-01-23 NOTE — TELEPHONE ENCOUNTER
HCG urine pending. Please advise on medication request.     Meena Cm CMA on 1/23/2023 at 8:42 AM

## 2023-01-26 ENCOUNTER — LAB (OUTPATIENT)
Dept: LAB | Facility: OTHER | Age: 33
End: 2023-01-26
Attending: FAMILY MEDICINE
Payer: COMMERCIAL

## 2023-01-26 DIAGNOSIS — Z01.812 PRE-PROCEDURE LAB EXAM: ICD-10-CM

## 2023-01-26 LAB — HCG UR QL: NEGATIVE

## 2023-01-26 PROCEDURE — 81025 URINE PREGNANCY TEST: CPT | Mod: ZL

## 2023-01-27 ENCOUNTER — HOSPITAL ENCOUNTER (OUTPATIENT)
Dept: MRI IMAGING | Facility: OTHER | Age: 33
Discharge: HOME | End: 2023-01-27
Attending: FAMILY MEDICINE | Admitting: FAMILY MEDICINE
Payer: COMMERCIAL

## 2023-01-27 DIAGNOSIS — R93.2 ABNORMAL LIVER ULTRASOUND: ICD-10-CM

## 2023-01-27 DIAGNOSIS — R10.11 RUQ ABDOMINAL PAIN: ICD-10-CM

## 2023-01-27 PROCEDURE — 255N000002 HC RX 255 OP 636: Performed by: FAMILY MEDICINE

## 2023-01-27 PROCEDURE — 74183 MRI ABD W/O CNTR FLWD CNTR: CPT

## 2023-01-27 PROCEDURE — A9575 INJ GADOTERATE MEGLUMI 0.1ML: HCPCS | Performed by: FAMILY MEDICINE

## 2023-01-27 RX ADMIN — GADOTERATE MEGLUMINE 20 ML: 376.9 INJECTION INTRAVENOUS at 15:36

## 2023-01-30 DIAGNOSIS — K76.9 LIVER LESION: ICD-10-CM

## 2023-01-30 DIAGNOSIS — R93.2 ABNORMAL LIVER ULTRASOUND: Primary | ICD-10-CM

## 2023-01-31 ENCOUNTER — TELEPHONE (OUTPATIENT)
Dept: FAMILY MEDICINE | Facility: OTHER | Age: 33
End: 2023-01-31
Payer: COMMERCIAL

## 2023-01-31 ENCOUNTER — MYC MEDICAL ADVICE (OUTPATIENT)
Dept: FAMILY MEDICINE | Facility: OTHER | Age: 33
End: 2023-01-31
Payer: COMMERCIAL

## 2023-01-31 DIAGNOSIS — D03.72 MELANOMA IN SITU OF LEFT LOWER LEG (H): ICD-10-CM

## 2023-01-31 DIAGNOSIS — R93.2 ABNORMAL LIVER ULTRASOUND: Primary | ICD-10-CM

## 2023-01-31 DIAGNOSIS — K76.9 LIVER LESION: ICD-10-CM

## 2023-01-31 NOTE — TELEPHONE ENCOUNTER
This patient would like to speak to a nurse about getting another referral  In place for a second opinion, besides the [facility] in [place] where she has an appointment scheduled 4/2/23. She wants to now where she can be seen sooner.     Natali Juarez on 1/31/2023 at 12:22 PM

## 2023-01-31 NOTE — TELEPHONE ENCOUNTER
Patient said she has a referral to see a liver specialist. Patient wants a referral for the [facility] in [place]. Please call and she has questions on the referral, when  and how soon can she be seen?    Natali Juarez on 1/31/2023 at 7:34 AM

## 2023-01-31 NOTE — TELEPHONE ENCOUNTER
DIAGNOSIS: Abnormal liver ultrasound [R93.2]  Liver lesion    Appt Date: 02.02.2023   NOTES STATUS DETAILS   OFFICE NOTE from referring provider Internal 01.30.2023 Liza Ordoñez MD   OFFICE NOTES from other specialists     DISCHARGE SUMMARY from hospital     MEDICATION LIST Internal    LIVER BIOSPY (IF APPLICABLE)      PATHOLOGY REPORTS      IMAGING     ENDOSCOPY (IF AVAILABLE)     COLONOSCOPY (IF AVAILABLE)     ULTRASOUND LIVER Internal 01.10.2023 US Abd Limited    CT OF ABDOMEN     MRI OF LIVER Internal 01.27.2023 MR LIVER W/O & W CONTRAST   FIBROSCAN, US ELASTOGRAPHY, FIBROSIS SCAN, MR ELASTOGRAPHY     LABS     HEPATIC PANEL (LIVER PANEL)     BASIC METABOLIC PANEL Internal 12.27.2022    COMPLETE METABOLIC PANEL Internal 12.27.2022    COMPLETE BLOOD COUNT (CBC)     INTERNATIONAL NORMALIZED RATIO (INR)     HEPATITIS C ANTIBODY     HEPATITIS C VIRAL LOAD/PCR     HEPATITIS C GENOTYPE     HEPATITIS B SURFACE ANTIGEN Internal 03.05.2021   HEPATITIS B SURFACE ANTIBODY     HEPATITIS B DNA QUANT LEVEL     HEPATITIS B CORE ANTIBODY

## 2023-01-31 NOTE — TELEPHONE ENCOUNTER
Called pt and let her know to call around and let us know where she wants it to be sent as it is up to her to call around and see where she can get in soonest.    Neli Ventura on 1/31/2023 at 12:42 PM

## 2023-02-01 NOTE — PROGRESS NOTES
[facility]at[facility]    New Patient Visit    Referring provider:  Liza Ordoñez  Chief complaint:  Hepatic lesions    Assessment  32 year old female witih PMHx of NAFLD, hypothyroidism, obesity, melanoma and gestational diabetes who presents for evaluation of hepatic lesions.    #. Hepatic Lesions  First noted 1/2023 on abdominal US and then MRI. Imaging was reviewed with the radiologist on 2/1/2023.  The lesions have positive T2 signal, diffusion signal and the patient has background steatosis.  The lesions do not have characteristics of a typical hemangioma which is typically bright on T2. The differential for the patient's lesions include hepatic adenoma vs focal nodular hyperplasia. Based on the review of imaging, the likelihood of HCC is low. The largest lesion is 2.3cm in size.     If the patient has a hepatic adenoma, oral contraceptives and increased estrogen state can increase the size of a hepatic adenoma. The patient currently has a mirena IUD in place.     #. NAFLD  #. Obesity  Patient with long history of obesity, close to BMI of 35. Hepatic steatosis noted on abdominal imaging. History of gestational diabetes, but no hypertension. LDL elevated at 122, but total cholesterol < 200. Spent a large portion of the visit discussing lifestyle and dietary changes to promote weight loss. FIB-4: 0.31, 90% NPV for advanced fibrosis. Platelet count > 150, less likely portal hypertension.     Plan  -- Follow up repeat HgA1c  -- Recommend MRI with Eovist to differentiate hepatic adenoma versus focal nodular hyperplasia; ordered.  -- Lifestyle and dietary changes with the goal of 7-10% weight loss   * Referral to comprehensive metabolic clinic    * Recommend limiting portion sizes, excluding NAFLD promoting components - processed food, foods & beverages high in added fructose   * Recommend aerobic exercise and resistance training  -- If inadequate response to lifestyle therapies and or BMI > 30,  recommend weight loss medication initiation, such as semaglutide or liraglutide    RTC: 6 months    Discussed with attending hepatologist, Dr. Alcon Moore MD  Transplant Hepatology Fellow  [phone]    HPI:    The patient underwent abdominal ultrasound due to some intermittent sharp RUQ discomfort due to an initial concern for gallbladder issues - this is how the hepatic lesions were found. Patient has also has had pain near her umbilicus, near her known hernia.     The patient has been overweight for many years. Her BMI has been close to 35 for > 5 years. She has tried diets without success. She has not tried pharmacotherapy. She does not exercise in the winter.     Patient denies any signs or symptoms of decompensation such as jaundice, abdominal distension, lower extremity edema, lethargy or confusion. Denies melena, hematemesis or hematochezia.    Patient denies nausea or emesis. Appetite stable. Denies diarrhea or constipation. Heartburn occasionally.     Medical hx Surgical hx   Past Medical History:   Diagnosis Date     Cyst of right ovary     2016     Diet controlled gestational diabetes mellitus (GDM) in third trimester      Herpesviral vesicular dermatitis     2016     Hypothyroidism     2015     Melanoma of skin (H) 2021     NAFLD (nonalcoholic fatty liver disease)       Past Surgical History:   Procedure Laterality Date     ADENOIDECTOMY      as a child     AS SHOULDER ARTHROSCOPY, DX Right     Dr. Bolivar - age 12      SECTION N/A 8/3/2019    Procedure:  SECTION;  Surgeon: Omar Flower MD;  Location: [facility] OR      SECTION N/A 10/11/2021    Procedure:  SECTION;  Surgeon: Omar Flower MD;  Location:  OR     MAMMOPLASTY REDUCTION      2007     PE TUBES      as a child    Abdominal surgeries: c-sections x 2       Medications  Current Outpatient Medications   Medication Sig Dispense Refill     acyclovir (ZOVIRAX) 800 MG tablet Take 1  tablet (800 mg) by mouth 5 times daily 50 tablet 3     fluticasone (FLONASE) 50 MCG/ACT nasal spray Spray 2 sprays into both nostrils daily (Patient taking differently: Spray 2 sprays into both nostrils daily as needed) 18.2 mL 11     levothyroxine (SYNTHROID/LEVOTHROID) 137 MCG tablet Take 1 tablet (137 mcg) by mouth daily 90 tablet 4     Prenatal Vit-Fe Fumarate-FA (PRENATAL MULTIVITAMIN W/IRON) 27-0.8 MG tablet Take 1 tablet by mouth daily 90 tablet 3       Allergies  Allergies   Allergen Reactions     Amoxicillin Hives     Childhood       Family hx Social hx   Family History   Problem Relation Age of Onset     Family History Negative Mother      Lupus Father      Family History Negative Brother      Kidney Disease Maternal Grandmother      Bladder Cancer Maternal Grandfather      Family History Negative Paternal Grandmother      Diabetes Paternal Grandfather       - Employment: works as an assistant county attorney  - Lives with  and two kids  - Alcohol: rare use, 3 times per year  - Tobacco: denies ever  - Drugs: denies     Review of systems  A 10-point review of systems was negative.    Examination  /83 (BP Location: Left arm, Patient Position: Sitting, Cuff Size: Adult Large)   Pulse 90   Wt 92.8 kg (204 lb 9.6 oz)   SpO2 96%   BMI 34.58 kg/m    Body mass index is 34.58 kg/m .    Gen-NAD  Eye- EOMI  ENT- MMM, normal oropharynx  CVS- RRR, no murmurs  RS- CTA bilaterally  Abd- Obese, soft, non-tender. Small umbilical hernia.   Extr- 2+ radial pulses bilaterally, no lower extremity edema bilaterally  MS- hands without clubbing  Neuro- A+Ox3, no asterixis  Skin- no rash or jaundice  Psych- normal mood    Laboratory  BMP  Recent Labs   Lab Test 12/27/22  1545 01/21/21  0913 12/31/19  0822 06/11/19  1703 01/16/18  1623    137  --   --  135   POTASSIUM 3.9 4.1  --   --  4.0   CHLORIDE 102 104  --   --  105   HENRY 9.8 9.6  --   --  8.2*   CO2 24 24  --   --  18*   BUN 11.1 15  --   --  13   CR  0.69 0.73  --   --  0.66*   GLC 98 101 115* 88 116*     CBC  Recent Labs   Lab Test 12/27/22  1545 10/12/21  0658 10/11/21  0627 06/29/21  0828 03/05/21  1647 01/21/21  0913   WBC 11.6*  --  8.0  --  11.9* 6.7   RBC 4.62  --  4.31  --  4.45 4.75   HGB 13.9   < > 12.8   < > 13.3 14.0   HCT 39.8  --  37.7  --  38.4 41.1   MCV 86  --  88  --  86 87   MCH 30.1  --  29.7  --  29.9 29.5   MCHC 34.9  --  34.0  --  34.6 34.1   RDW 11.9  --  12.9  --  11.9 11.9     --  265  --  366 374    < > = values in this interval not displayed.     Liver Enzymes   Recent Labs   Lab Test 12/27/22  1545   PROTTOTAL 7.6   ALBUMIN 4.9   BILITOTAL 0.2   ALKPHOS 73   AST 16   ALT 20      Radiology  Abdominal US 1/10/2023  1.  No acute right upper quadrant abnormality.   2.  1.6 cm hypoechoic focus in the liver, nonspecific. Recommend follow-up liver MRI to further characterize.  3.  Possible small fat-containing periumbilical hernia.    MRI Liver w and w/o Contrast 1/27/2023  Striking hepatic steatosis.      At least 6 discrete hepatic lesions measuring up to 2.3 cm with  preserved intrinsic T1 signal and persistent postcontrast enhancement.  No highly suspicious feature such as washout or a lesion capsule.  Differential considerations remain relatively broad. Nodular areas of  fatty sparing (THAADs), multiple flash filling hemangiomas, and less  likely multiple adenomas are possible. Metastatic disease is less  likely but not excluded, particularly given the history of prior  melanoma excision. 6 month follow-up MRI should be considered.

## 2023-02-02 ENCOUNTER — OFFICE VISIT (OUTPATIENT)
Dept: GASTROENTEROLOGY | Facility: CLINIC | Age: 33
End: 2023-02-02
Attending: INTERNAL MEDICINE
Payer: COMMERCIAL

## 2023-02-02 ENCOUNTER — PRE VISIT (OUTPATIENT)
Dept: GASTROENTEROLOGY | Facility: CLINIC | Age: 33
End: 2023-02-02
Payer: COMMERCIAL

## 2023-02-02 VITALS
HEART RATE: 90 BPM | BODY MASS INDEX: 34.58 KG/M2 | WEIGHT: 204.6 LBS | SYSTOLIC BLOOD PRESSURE: 125 MMHG | DIASTOLIC BLOOD PRESSURE: 83 MMHG | OXYGEN SATURATION: 96 %

## 2023-02-02 DIAGNOSIS — K76.0 NAFLD (NONALCOHOLIC FATTY LIVER DISEASE): Primary | ICD-10-CM

## 2023-02-02 DIAGNOSIS — E66.811 CLASS 1 OBESITY DUE TO EXCESS CALORIES IN ADULT, UNSPECIFIED BMI, UNSPECIFIED WHETHER SERIOUS COMORBIDITY PRESENT: ICD-10-CM

## 2023-02-02 DIAGNOSIS — K76.9 LIVER LESION: ICD-10-CM

## 2023-02-02 DIAGNOSIS — R93.2 ABNORMAL LIVER ULTRASOUND: ICD-10-CM

## 2023-02-02 DIAGNOSIS — E66.09 CLASS 1 OBESITY DUE TO EXCESS CALORIES IN ADULT, UNSPECIFIED BMI, UNSPECIFIED WHETHER SERIOUS COMORBIDITY PRESENT: ICD-10-CM

## 2023-02-02 PROCEDURE — G0463 HOSPITAL OUTPT CLINIC VISIT: HCPCS | Performed by: INTERNAL MEDICINE

## 2023-02-02 PROCEDURE — 99204 OFFICE O/P NEW MOD 45 MIN: CPT | Mod: GC | Performed by: INTERNAL MEDICINE

## 2023-02-02 NOTE — PATIENT INSTRUCTIONS
- MRI with eovist to differentiate hepatic adenoma vs focal nodular hyperplasia; consider [facility] for open MRI  - Medical weight loss clinic referral  - Follow up in 6 months

## 2023-02-02 NOTE — NURSING NOTE
Amanda Mckeon is a 32 year old female patient that presents today in clinic for the following:    Chief Complaint   Patient presents with     Liver     Abnormal liver ultrasound and MRI     The patient's allergies and medications were reviewed as noted. A set of vitals were recorded as noted without incident: /83 (BP Location: Left arm, Patient Position: Sitting, Cuff Size: Adult Large)   Pulse 90   Wt 92.8 kg (204 lb 9.6 oz)   SpO2 96%   BMI 34.58 kg/m  . The patient does not have any other questions for the provider.    Gretchen Manzo, EMT at 9:41 AM on 2/2/2023

## 2023-02-02 NOTE — LETTER
2/2/2023         RE: Amanda Mckeon  [address] [facility] [address]        Dear Colleague,    Thank you for referring your patient, Amanda Mckeon, to the [facility] [place]. Please see a copy of my visit note below.    [facility]at[facility]    New Patient Visit    Referring provider:  Liza Ordoñez  Chief complaint:  Hepatic lesions    Assessment  32 year old female witih PMHx of NAFLD, hypothyroidism, obesity, melanoma and gestational diabetes who presents for evaluation of hepatic lesions.    #. Hepatic Lesions  First noted 1/2023 on abdominal US and then MRI. Imaging was reviewed with the radiologist on 2/1/2023.  The lesions have positive T2 signal, diffusion signal and the patient has background steatosis.  The lesions do not have characteristics of a typical hemangioma which is typically bright on T2. The differential for the patient's lesions include hepatic adenoma vs focal nodular hyperplasia. Based on the review of imaging, the likelihood of HCC is low. The largest lesion is 2.3cm in size.     If the patient has a hepatic adenoma, oral contraceptives and increased estrogen state can increase the size of a hepatic adenoma. The patient currently has a mirena IUD in place.     #. NAFLD  #. Obesity  Patient with long history of obesity, close to BMI of 35. Hepatic steatosis noted on abdominal imaging. History of gestational diabetes, but no hypertension. LDL elevated at 122, but total cholesterol < 200. Spent a large portion of the visit discussing lifestyle and dietary changes to promote weight loss. FIB-4: 0.31, 90% NPV for advanced fibrosis. Platelet count > 150, less likely portal hypertension.     Plan  -- Follow up repeat HgA1c  -- Recommend MRI with Eovist to differentiate hepatic adenoma versus focal nodular hyperplasia; ordered.  -- Lifestyle and dietary changes with the goal of 7-10% weight loss   * Referral to comprehensive metabolic  clinic    * Recommend limiting portion sizes, excluding NAFLD promoting components - processed food, foods & beverages high in added fructose   * Recommend aerobic exercise and resistance training  -- If inadequate response to lifestyle therapies and or BMI > 30, recommend weight loss medication initiation, such as semaglutide or liraglutide    RTC: 6 months    Discussed with attending hepatologist, Dr. Alcon Moore MD  Transplant Hepatology Fellow  [phone]    HPI:    The patient underwent abdominal ultrasound due to some intermittent sharp RUQ discomfort due to an initial concern for gallbladder issues - this is how the hepatic lesions were found. Patient has also has had pain near her umbilicus, near her known hernia.     The patient has been overweight for many years. Her BMI has been close to 35 for > 5 years. She has tried diets without success. She has not tried pharmacotherapy. She does not exercise in the winter.     Patient denies any signs or symptoms of decompensation such as jaundice, abdominal distension, lower extremity edema, lethargy or confusion. Denies melena, hematemesis or hematochezia.    Patient denies nausea or emesis. Appetite stable. Denies diarrhea or constipation. Heartburn occasionally.     Medical hx Surgical hx   Past Medical History:   Diagnosis Date     Cyst of right ovary     2016     Diet controlled gestational diabetes mellitus (GDM) in third trimester      Herpesviral vesicular dermatitis     2016     Hypothyroidism     2015     Melanoma of skin (H) 2021     NAFLD (nonalcoholic fatty liver disease)       Past Surgical History:   Procedure Laterality Date     ADENOIDECTOMY      as a child     AS SHOULDER ARTHROSCOPY, DX Right     Dr. Bolivar - age 12      SECTION N/A 8/3/2019    Procedure:  SECTION;  Surgeon: Omar Flower MD;  Location:  OR      SECTION N/A 10/11/2021    Procedure:  SECTION;  Surgeon: Ching  Omar LOCKETT MD;  Location: [facility] OR     MAMMOPLASTY REDUCTION      2007     PE TUBES      as a child    Abdominal surgeries: c-sections x 2       Medications  Current Outpatient Medications   Medication Sig Dispense Refill     acyclovir (ZOVIRAX) 800 MG tablet Take 1 tablet (800 mg) by mouth 5 times daily 50 tablet 3     fluticasone (FLONASE) 50 MCG/ACT nasal spray Spray 2 sprays into both nostrils daily (Patient taking differently: Spray 2 sprays into both nostrils daily as needed) 18.2 mL 11     levothyroxine (SYNTHROID/LEVOTHROID) 137 MCG tablet Take 1 tablet (137 mcg) by mouth daily 90 tablet 4     Prenatal Vit-Fe Fumarate-FA (PRENATAL MULTIVITAMIN W/IRON) 27-0.8 MG tablet Take 1 tablet by mouth daily 90 tablet 3       Allergies  Allergies   Allergen Reactions     Amoxicillin Hives     Childhood       Family hx Social hx   Family History   Problem Relation Age of Onset     Family History Negative Mother      Lupus Father      Family History Negative Brother      Kidney Disease Maternal Grandmother      Bladder Cancer Maternal Grandfather      Family History Negative Paternal Grandmother      Diabetes Paternal Grandfather       - Employment: works as an assistant county attorney  - Lives with  and two kids  - Alcohol: rare use, 3 times per year  - Tobacco: denies ever  - Drugs: denies     Review of systems  A 10-point review of systems was negative.    Examination  /83 (BP Location: Left arm, Patient Position: Sitting, Cuff Size: Adult Large)   Pulse 90   Wt 92.8 kg (204 lb 9.6 oz)   SpO2 96%   BMI 34.58 kg/m    Body mass index is 34.58 kg/m .    Gen-NAD  Eye- EOMI  ENT- MMM, normal oropharynx  CVS- RRR, no murmurs  RS- CTA bilaterally  Abd- Obese, soft, non-tender. Small umbilical hernia.   Extr- 2+ radial pulses bilaterally, no lower extremity edema bilaterally  MS- hands without clubbing  Neuro- A+Ox3, no asterixis  Skin- no rash or jaundice  Psych- normal mood    Laboratory  BMP  Recent Labs   Lab  Test 12/27/22  1545 01/21/21  0913 12/31/19  0822 06/11/19  1703 01/16/18  1623    137  --   --  135   POTASSIUM 3.9 4.1  --   --  4.0   CHLORIDE 102 104  --   --  105   HENRY 9.8 9.6  --   --  8.2*   CO2 24 24  --   --  18*   BUN 11.1 15  --   --  13   CR 0.69 0.73  --   --  0.66*   GLC 98 101 115* 88 116*     CBC  Recent Labs   Lab Test 12/27/22  1545 10/12/21  0658 10/11/21  0627 06/29/21  0828 03/05/21  1647 01/21/21  0913   WBC 11.6*  --  8.0  --  11.9* 6.7   RBC 4.62  --  4.31  --  4.45 4.75   HGB 13.9   < > 12.8   < > 13.3 14.0   HCT 39.8  --  37.7  --  38.4 41.1   MCV 86  --  88  --  86 87   MCH 30.1  --  29.7  --  29.9 29.5   MCHC 34.9  --  34.0  --  34.6 34.1   RDW 11.9  --  12.9  --  11.9 11.9     --  265  --  366 374    < > = values in this interval not displayed.     Liver Enzymes   Recent Labs   Lab Test 12/27/22  1545   PROTTOTAL 7.6   ALBUMIN 4.9   BILITOTAL 0.2   ALKPHOS 73   AST 16   ALT 20      Radiology  Abdominal US 1/10/2023  1.  No acute right upper quadrant abnormality.   2.  1.6 cm hypoechoic focus in the liver, nonspecific. Recommend follow-up liver MRI to further characterize.  3.  Possible small fat-containing periumbilical hernia.    MRI Liver w and w/o Contrast 1/27/2023  Striking hepatic steatosis.      At least 6 discrete hepatic lesions measuring up to 2.3 cm with  preserved intrinsic T1 signal and persistent postcontrast enhancement.  No highly suspicious feature such as washout or a lesion capsule.  Differential considerations remain relatively broad. Nodular areas of  fatty sparing (THAADs), multiple flash filling hemangiomas, and less  likely multiple adenomas are possible. Metastatic disease is less  likely but not excluded, particularly given the history of prior  melanoma excision. 6 month follow-up MRI should be considered.      Attestation signed by Aman Rondon MD at 2/5/2023  1:32 PM:  The patient was seen and examined.  The above assessment and plan was  developed jointly with the fellow.      Thank you very much for allowing me to participate in the care of this patient.  If you have any questions regarding my recommendations, please do not hesitate to contact me.         Aman Rondon MD      Professor of Medicine  [employer]      Executive Medical Director, Solid Organ Transplant Program  [facility]        Again, thank you for allowing me to participate in the care of your patient.      Sincerely,    Sofie Moore MD

## 2023-02-07 ENCOUNTER — MYC MEDICAL ADVICE (OUTPATIENT)
Dept: FAMILY MEDICINE | Facility: OTHER | Age: 33
End: 2023-02-07
Payer: COMMERCIAL

## 2023-02-07 DIAGNOSIS — A09 TRAVELER'S DIARRHEA: Primary | ICD-10-CM

## 2023-02-07 RX ORDER — AZITHROMYCIN 250 MG/1
500 TABLET, FILM COATED ORAL DAILY
Qty: 6 TABLET | Refills: 0 | Status: SHIPPED | OUTPATIENT
Start: 2023-02-07 | End: 2023-02-10

## 2023-02-07 RX ORDER — AZITHROMYCIN 250 MG/1
TABLET, FILM COATED ORAL
Qty: 6 TABLET | Refills: 0 | Status: SHIPPED | OUTPATIENT
Start: 2023-02-07 | End: 2023-02-07

## 2023-03-06 NOTE — TELEPHONE ENCOUNTER
REFERRAL INFORMATION:    Referring Provider:  Liza Ordoñez MD    Referring Clinic:  [facility]     Reason for Visit/Diagnosis: NEW MWM-JOINER, recent BMI 37.7+comorbs, pt will confirm MWM/RD cvg and complete history/ref: Sofie Moore MD       FUTURE VISIT INFORMATION:    Appointment Date: 4/5/23    Appointment Time: 7AM     NOTES RECORD STATUS  DETAILS   OFFICE NOTE from Referring Provider Internal 12/27/22 OV Liza Ordoñez MD   OFFICE NOTE from Other Specialists Internal 2/2/23 OV Sofie Moore MD   IMAGING (CT, MRI, US, XR)  Internal MR LIVER: 1/27/23   US ABD: 1/10/23

## 2023-03-09 ENCOUNTER — MYC MEDICAL ADVICE (OUTPATIENT)
Dept: FAMILY MEDICINE | Facility: OTHER | Age: 33
End: 2023-03-09
Payer: COMMERCIAL

## 2023-03-09 DIAGNOSIS — F40.240 CLAUSTROPHOBIA: ICD-10-CM

## 2023-03-09 DIAGNOSIS — Z00.00 ROUTINE GENERAL MEDICAL EXAMINATION AT A HEALTH CARE FACILITY: Primary | ICD-10-CM

## 2023-03-09 DIAGNOSIS — F41.9 ANXIETY: ICD-10-CM

## 2023-03-09 RX ORDER — ALPRAZOLAM 1 MG
TABLET ORAL
Qty: 2 TABLET | Refills: 0 | Status: SHIPPED | OUTPATIENT
Start: 2023-03-09 | End: 2023-12-01

## 2023-03-15 ENCOUNTER — OFFICE VISIT (OUTPATIENT)
Dept: FAMILY MEDICINE | Facility: OTHER | Age: 33
End: 2023-03-15
Attending: NURSE PRACTITIONER
Payer: COMMERCIAL

## 2023-03-15 DIAGNOSIS — Z00.00 ROUTINE GENERAL MEDICAL EXAMINATION AT A HEALTH CARE FACILITY: ICD-10-CM

## 2023-03-15 LAB — HCG UR QL: NEGATIVE

## 2023-03-15 PROCEDURE — 81025 URINE PREGNANCY TEST: CPT | Mod: ZL

## 2023-03-16 NOTE — PROGRESS NOTES
Patient in clinic for urine pregnancy test before MRI on Sunday.  Dr. Ordoñez ordered for patient and phone nurse recommended patient come to  to leave sample.  Amaris Shell LPN LPN....................  3/15/2023   7:04 PM

## 2023-03-19 ENCOUNTER — ANCILLARY PROCEDURE (OUTPATIENT)
Dept: MRI IMAGING | Facility: CLINIC | Age: 33
End: 2023-03-19
Attending: INTERNAL MEDICINE
Payer: COMMERCIAL

## 2023-03-19 DIAGNOSIS — R93.2 ABNORMAL LIVER ULTRASOUND: ICD-10-CM

## 2023-03-19 DIAGNOSIS — K76.9 LIVER LESION: ICD-10-CM

## 2023-03-19 DIAGNOSIS — K76.0 NAFLD (NONALCOHOLIC FATTY LIVER DISEASE): ICD-10-CM

## 2023-03-19 PROCEDURE — 74183 MRI ABD W/O CNTR FLWD CNTR: CPT | Performed by: RADIOLOGY

## 2023-03-19 PROCEDURE — A9581 GADOXETATE DISODIUM INJ: HCPCS | Performed by: RADIOLOGY

## 2023-03-20 DIAGNOSIS — K76.0 NAFLD (NONALCOHOLIC FATTY LIVER DISEASE): Primary | ICD-10-CM

## 2023-04-04 VITALS — BODY MASS INDEX: 32.15 KG/M2 | HEIGHT: 65 IN | WEIGHT: 193 LBS

## 2023-04-04 ASSESSMENT — PAIN SCALES - GENERAL: PAINLEVEL: NO PAIN (0)

## 2023-04-04 NOTE — NURSING NOTE
"(   Chief Complaint   Patient presents with     New Patient    )    ( Weight: 193 lb )  ( Height: 5' 4.5\" )  ( BMI (Calculated): 32.62 )  (   )  (   )  (   )  (   )  (   )  (   )    (   )  (   )  (   )  (   )  (   )  (   )  (   )    (   Patient Active Problem List   Diagnosis     Ganglion cyst     Recurrent cold sores     Hypothyroidism, unspecified type     Chronic seasonal allergic rhinitis, unspecified trigger     Encounter for contraceptive management, unspecified type     Pregnancy, unspecified gestational age     Gestational diabetes mellitus (GDM) in third trimester, gestational diabetes method of control unspecified     Encounter for triage in pregnant patient     Pregnancy     History of  section     Melanoma in situ of left lower leg (H)     Abnormal ultrasonic finding on  screening of mother     Diet controlled gestational diabetes mellitus (GDM) in third trimester     History of     )  (   Current Outpatient Medications   Medication Sig Dispense Refill     acyclovir (ZOVIRAX) 800 MG tablet Take 1 tablet (800 mg) by mouth 5 times daily 50 tablet 3     ALPRAZolam (XANAX) 1 MG tablet Take 1 by mouth 30 minutes prior to MRI.  May repeat x 1 if needed. 2 tablet 0     fluticasone (FLONASE) 50 MCG/ACT nasal spray Spray 2 sprays into both nostrils daily (Patient taking differently: Spray 2 sprays into both nostrils daily as needed) 18.2 mL 11     levothyroxine (SYNTHROID/LEVOTHROID) 137 MCG tablet Take 1 tablet (137 mcg) by mouth daily 90 tablet 4     Prenatal Vit-Fe Fumarate-FA (PRENATAL MULTIVITAMIN W/IRON) 27-0.8 MG tablet Take 1 tablet by mouth daily 90 tablet 3    )  ( Diabetes Eval:    )    ( Pain Eval:  No Pain (0) )    ( Wound Eval:       )    (   History   Smoking Status     Never   Smokeless Tobacco     Never    )    ( Signed By:  Sarah Meeks; 2023; 1:54 PM )      "

## 2023-04-04 NOTE — PROGRESS NOTES
Amanda Mckeon is a 32 year old who is being evaluated via a billable video visit.      How would you like to obtain your AVS? MyChart  If the video visit is dropped, the invitation should be resent by: Text to cell phone: [phone]  Will anyone else be joining your video visit? No  If patient encounters technical issues they should call [phone]    During this virtual visit the patient is located in [place], patient verifies this as the location during the entirety of this visit.     Video-Visit Details  Video Start Time: 0701    Type of service:  Video Visit    Video End Time:0747    Originating Location (pt. Location): Home  Distant Location (provider location):  [facility]  Platform used for Video Visit: Tammi Meeks CMA  4/4/2023      1:55 PM

## 2023-04-05 ENCOUNTER — PRE VISIT (OUTPATIENT)
Dept: ENDOCRINOLOGY | Facility: CLINIC | Age: 33
End: 2023-04-05

## 2023-04-05 ENCOUNTER — VIRTUAL VISIT (OUTPATIENT)
Dept: ENDOCRINOLOGY | Facility: CLINIC | Age: 33
End: 2023-04-05
Payer: COMMERCIAL

## 2023-04-05 DIAGNOSIS — K76.0 NAFLD (NONALCOHOLIC FATTY LIVER DISEASE): ICD-10-CM

## 2023-04-05 DIAGNOSIS — K76.9 LIVER LESION: ICD-10-CM

## 2023-04-05 DIAGNOSIS — R93.2 ABNORMAL LIVER ULTRASOUND: ICD-10-CM

## 2023-04-05 DIAGNOSIS — E66.811 CLASS 1 OBESITY WITH SERIOUS COMORBIDITY AND BODY MASS INDEX (BMI) OF 32.0 TO 32.9 IN ADULT, UNSPECIFIED OBESITY TYPE: Primary | ICD-10-CM

## 2023-04-05 PROCEDURE — 99205 OFFICE O/P NEW HI 60 MIN: CPT | Mod: VID | Performed by: NURSE PRACTITIONER

## 2023-04-05 NOTE — PROGRESS NOTES
"70 minutes spent by me on the date of the encounter doing chart review, history and exam, documentation and further activities per the note    New Medical Weight Management Consult    PATIENT:  Amanda Mckeon  MRN:         [id number]  :         1990  LAURA:         2023    Dear Dr. Ordoñez and Dr. Moore    I had the pleasure of seeing your patient, Amanda Mckeon. Full intake/assessment was done to determine barriers to weight loss success and develop a treatment plan. Amanda Mckeon is a 32 year old female interested in treatment of medical problems associated with excess weight. She has a height of 5' 4.5\", a weight of 193 lbs 0 oz, and the calculated Body mass index is 32.62 kg/m .      Assessment & Plan   Problem List Items Addressed This Visit        Digestive    Class 1 obesity with serious comorbidity and body mass index (BMI) of 32.0 to 32.9 in adult, unspecified obesity type - Primary     Struggles with weight starting very early in life. 30lb gain associated with college and birth control. After having kids has found weight more difficult to manage. Had gestational diabetes with pregnancies and ended up losing weight. Gradual gain after that. Struggles to stick to any plan secondary to enjoying food and hunger/ cravings. New diagnosis of hepatic steatosis when lesions on liver seen. Hepatology referred her to weight management to work on weight loss for the treatment for hepatic steatosis. She has already started on weight loss and has been able to lose 9lb. comorbidity include hepatic steatosis, elevated LDL, hx of gestational diabetes, and hypothyroidism.     We discussed role of AOM. We discussed benefits and risks of GLP1 agonists and phentermine/ topiramate. She has looked at insurance and doesn't have coverage for weight loss medications. GLP1 will likely be cost prohibitive.  For now, we will start with diet and lifestyle changes. Has concerns about being on meds \"forever\" and potential " "side effects.     Describes really busy schedule and difficulty planning ahead. Describes a lot of enjoyment of food and cravings making it difficult to adhere to any plan previously tried. She also notes hunger most of the day. Recently trying to bring food to work which has been beneficial.     Plans:   Get labs done   Start working with dietitian   Goals would be increasing protein and decreasing carbs  Start assessing hunger before snacking   Keep up the great work with exercise  Follow up 2 months   Read about topiramate and phentermine         Other Visit Diagnoses     Abnormal liver ultrasound        Liver lesion        NAFLD (nonalcoholic fatty liver disease)               She has the following co-morbidities:        4/3/2023     9:39 PM   --   I have the following health issues associated with obesity Fatty Liver    Hypothyroidism   I have the following symptoms associated with obesity None of the above            View : No data to display.                    4/3/2023     9:39 PM   Referring Provider   Please name the provider who referred you to Medical Weight Management  If you do not know, please answer \"I Don't Know\" Dr. Moore           4/3/2023     9:39 PM   Weight History   How concerned are you about your weight? Somewhat Concerned   I became overweight As an Adult   The following factors have contributed to my weight gain Started on Medication that Caused Weight Gain    Eating Wrong Types of Food    Eating Too Much    Lack of Exercise    Genetic (Runs in the Family)    Stress   I have tried the following methods to lose weight Watching Portions or Calories    Exercise    Fasting   I have the following family history of obesity/being overweight My mother is overweight    My father is overweight    Many of my relatives are overweight      Never thin   30lb gain in college, maybe around time of a specific birth control  2 pregnancies- lost weight with both - gestational diabetes     Never able to lose " more than 20lb - can't stick to a plan - time, enjoys food     Has lost about 9lb since initial consult - going to gym on some lunch breaks and trying to eat healthier         4/3/2023     9:39 PM   Diet Recall Review with Patient   How often do you have a drink of alcohol? Monthly or Less   If you do drink, how many drinks might you have in a day? 1 or 2           4/3/2023     9:39 PM   Eating Habits   Generally, my meals include foods like these bread, pasta, rice, potatoes, corn, crackers, sweet dessert, pop, or juice Once a Week   Generally, my meals include foods like these fried meats, brats, burgers, french fries, pizza, cheese, chips, or ice cream Once a Week   Eat fast food (like [employer]onal[employer], [employer], [employer]aco Bell) Less Than Weekly   Eat at a buffet or sit-down restaurant Once a Week   Eat most of my meals in front of the TV or computer Never   Often skip meals, eat at random times, have no regular eating times Almost Everyday   Rarely sit down for a meal but snack or graze throughout Less Than Weekly   Eat extra snacks between meals Less Than Weekly   Eat most of my food at the end of the day A Few Times a Week   Eat in the middle of the night or wake up at night to eat Never   Eat extra snacks to prevent or correct low blood sugar Never   Eat to prevent acid reflux or stomach pain Never   Worry about not having enough food to eat Never   I eat when I am depressed Never   I eat when I am stressed A Few Times a Week   I eat when I am bored A Few Times a Week   I eat when I am anxious Never   I eat when I am happy or as a reward A Few Times a Week   I feel hungry all the time even if I just have eaten Less Than Weekly   Feeling full is important to me Everyday   I finish all the food on my plate even if I am already full A Few Times a Week   I can't resist eating delicious food or walk past the good food/smell A Few Times a Week   I eat/snack without noticing that I am eating Less Than Weekly   I eat when I  am preparing the meal A Few Times a Week   I eat more than usual when I see others eating Less Than Weekly   I have trouble not eating sweets, ice cream, cookies, or chips if they are around the house A Few Times a Week   I think about food all day Less Than Weekly   What foods, if any, do you crave? Chips/Crackers      Trying to eat more lean meats and baked foods   Enjoys food   Really busy so that makes it difficult to plan  Important for her to enjoy foods   Better when busy at work - more hunger out side of work   recently started brining food to work instead of purchasing foods from restaurant         4/3/2023     9:39 PM   Amount of Food   I make myself vomit what I have eaten or use laxatives to get rid of food Never   I eat a large amount of food, like a loaf of bread, a box of cookies, a pint/quart of ice cream, all at once Never   I eat a large amount of food even when I am not hungry Never   I eat rapidly Never   I eat alone because I feel embarrassed and do not want others to see how much I have eaten Never   I eat until I am uncomfortably full Monthly   I feel bad, disgusted, or guilty after I overeat Never           4/3/2023     9:39 PM   Activity/Exercise History   How much of a typical 12 hour day do you spend sitting? Most of the Day   How much of a typical 12 hour day do you spend lying down? Less Than Half the Day   How much of a typical day do you spend walking/standing? Less Than Half the Day   How many hours (not including work) do you spend on the TV/Video Games/Computer/Tablet/Phone? 2-3 Hours   How many times a week are you active for the purpose of exercise? 2-3 Times a Week   What keeps you from being more active? Lack of Time   How many total minutes do you spend doing some activity for the purpose of exercising when you exercise? 15-30 Minutes       PAST MEDICAL HISTORY:  Past Medical History:   Diagnosis Date     Cyst of right ovary     11/11/2016     Diet controlled gestational  diabetes mellitus (GDM) in third trimester      Herpesviral vesicular dermatitis     11/11/2016     Hypothyroidism     11/9/2015     Melanoma of skin (H) 01/2021     NAFLD (nonalcoholic fatty liver disease)            4/3/2023     9:39 PM   Work/Social History Reviewed With Patient   My employment status is Full-Time   How much of your job is spent on the computer or phone? 100%   What is your marital status? /In a Relationship   If in a relationship, is your significant other overweight? No   If you have children, are they overweight? No           4/3/2023     9:39 PM   Mental Health History Reviewed With Patient   How often in the past 2 weeks have you felt little interest or pleasure in doing things? Not at all   Over the past 2 weeks how often have you felt down, depressed, or hopeless? Not at all     Sleep is okay - limited by time          View : No data to display.                MEDICATIONS:   Current Outpatient Medications   Medication Sig Dispense Refill     acyclovir (ZOVIRAX) 800 MG tablet Take 1 tablet (800 mg) by mouth 5 times daily 50 tablet 3     ALPRAZolam (XANAX) 1 MG tablet Take 1 by mouth 30 minutes prior to MRI.  May repeat x 1 if needed. 2 tablet 0     fluticasone (FLONASE) 50 MCG/ACT nasal spray Spray 2 sprays into both nostrils daily (Patient taking differently: Spray 2 sprays into both nostrils daily as needed) 18.2 mL 11     levothyroxine (SYNTHROID/LEVOTHROID) 137 MCG tablet Take 1 tablet (137 mcg) by mouth daily 90 tablet 4     Prenatal Vit-Fe Fumarate-FA (PRENATAL MULTIVITAMIN W/IRON) 27-0.8 MG tablet Take 1 tablet by mouth daily 90 tablet 3       ALLERGIES:   Allergies   Allergen Reactions     Amoxicillin Hives     Childhood       Office Visit on 03/15/2023   Component Date Value Ref Range Status     hCG Urine Qualitative 03/15/2023 Negative  Negative Final    This test is for screening purposes.  Results should be interpreted along with the clinical picture.  Confirmation  "testing is available if warranted by ordering [id number], HCG Quantitative Pregnancy.       Anti-obesity medication ROS:    HEENT  Hx of glaucoma: No    Cardiovascular  CAD:No  HTN:No    Gastrointestinal  GERD:occasional, worse with weight gain   Constipation/diarrhea/GI issues:No  Liver Dz:Yes  FIB-4 Calculation: 0.31 at 12/27/2022  3:45 PM  Calculated from:  SGOT/AST: 16 U/L at 12/27/2022  3:45 PM  SGPT/ALT: 20 U/L at 12/27/2022  3:45 PM  Platelets: 381 10e3/uL at 12/27/2022  3:45 PM  Age: 32 years    H/O Pancreatitis:No    Psychiatric  Bipolar: No  Anxiety:No  Depression:No  History of alcohol/drug abuse: No  Hx of eating disorder:No    Endocrine  Personal or family hx of MTC or MEN2:No  Diabetes/prediabetes: No    Neurologic:  Hx of seizures: No  Hx of migraines: No  Memory Impairment: No  Chronic pain/opioids use: No      History of kidney stones: No  Kidney disease: No  Current birth control: mirena     PHYSICAL EXAM:  Objective    Ht 1.638 m (5' 4.5\")   Wt 87.5 kg (193 lb)   BMI 32.62 kg/m    Physical Exam   GENERAL: Healthy, alert and no distress  EYES: Eyes grossly normal to inspection.  No discharge or erythema, or obvious scleral/conjunctival abnormalities.  RESP: No audible wheeze, cough, or visible cyanosis.  No visible retractions or increased work of breathing.    SKIN: Visible skin clear. No significant rash, abnormal pigmentation or lesions.  NEURO: Cranial nerves grossly intact.  Mentation and speech appropriate for age.  PSYCH: Mentation appears normal, affect normal/bright, judgement and insight intact, normal speech and appearance well-groomed.    FIB-4 Calculation: 0.31 at 12/27/2022  3:45 PM  Calculated from:  SGOT/AST: 16 U/L at 12/27/2022  3:45 PM  SGPT/ALT: 20 U/L at 12/27/2022  3:45 PM  Platelets: 381 10e3/uL at 12/27/2022  3:45 PM  Age: 32 years    Fib-4 < 1.3: No further evaluation at this point, unless other concerns  - If the Fib-4 is > 2.67,  Fibroscan and elective liver clinic " referral  - Intermediate Fib-4 scores: Get a Fibroscan, consider repeating this in 1-2 years.    Sincerely,    Claudette Fajardo NP

## 2023-04-05 NOTE — ASSESSMENT & PLAN NOTE
"Struggles with weight starting very early in life. 30lb gain associated with college and birth control. After having kids has found weight more difficult to manage. Had gestational diabetes with pregnancies and ended up losing weight. Gradual gain after that. Struggles to stick to any plan secondary to enjoying food and hunger/ cravings. New diagnosis of hepatic steatosis when lesions on liver seen. Hepatology referred her to weight management to work on weight loss for the treatment for hepatic steatosis. She has already started on weight loss and has been able to lose 9lb. comorbidity include hepatic steatosis, elevated LDL, hx of gestational diabetes, and hypothyroidism.     We discussed role of AOM. We discussed benefits and risks of GLP1 agonists and phentermine/ topiramate. She has looked at insurance and doesn't have coverage for weight loss medications. GLP1 will likely be cost prohibitive.  For now, we will start with diet and lifestyle changes. Has concerns about being on meds \"forever\" and potential side effects.     Describes really busy schedule and difficulty planning ahead. Describes a lot of enjoyment of food and cravings making it difficult to adhere to any plan previously tried. She also notes hunger most of the day. Recently trying to bring food to work which has been beneficial.     Plans:   Get labs done   Start working with dietitian   Goals would be increasing protein and decreasing carbs  Start assessing hunger before snacking   Keep up the great work with exercise  Follow up 2 months   Read about topiramate and phentermine   "

## 2023-04-05 NOTE — LETTER
"2023       RE: Amanda Mckeon  [address] [facility] [id number]     Dear Colleague,    Thank you for referring your patient, Amanda Mckeon, to the [facility] [place] [facility]. Please see a copy of my visit note below.    Amanda Mckeon is a 32 year old who is being evaluated via a billable video visit.      How would you like to obtain your AVS? MyChart  If the video visit is dropped, the invitation should be resent by: Text to cell phone: [phone]  Will anyone else be joining your video visit? No  If patient encounters technical issues they should call [phone]    During this virtual visit the patient is located in [place], patient verifies this as the location during the entirety of this visit.     Video-Visit Details  Video Start Time: 701    Type of service:  Video Visit    Video End Time:747    Originating Location (pt. Location): Home  Distant Location (provider location):  [facility] [place]  Platform used for Video Visit: Tammi Meeks CMA  2023      1:55 PM      70 minutes spent by me on the date of the encounter doing chart review, history and exam, documentation and further activities per the note    New Medical Weight Management Consult    PATIENT:  Amanda Mckeon  MRN:         [id number]  :         1990  LAURA:         2023    Dear Dr. Ordoñez and Dr. Moore    I had the pleasure of seeing your patient, Amanda Mckeon. Full intake/assessment was done to determine barriers to weight loss success and develop a treatment plan. Amanda Mckeon is a 32 year old female interested in treatment of medical problems associated with excess weight. She has a height of 5' 4.5\", a weight of 193 lbs 0 oz, and the calculated Body mass index is 32.62 kg/m .      Assessment & Plan   Problem List Items Addressed This Visit          Digestive    Class 1 obesity with " "serious comorbidity and body mass index (BMI) of 32.0 to 32.9 in adult, unspecified obesity type - Primary     Struggles with weight starting very early in life. 30lb gain associated with college and birth control. After having kids has found weight more difficult to manage. Had gestational diabetes with pregnancies and ended up losing weight. Gradual gain after that. Struggles to stick to any plan secondary to enjoying food and hunger/ cravings. New diagnosis of hepatic steatosis when lesions on liver seen. Hepatology referred her to weight management to work on weight loss for the treatment for hepatic steatosis. She has already started on weight loss and has been able to lose 9lb. comorbidity include hepatic steatosis, elevated LDL, hx of gestational diabetes, and hypothyroidism.     We discussed role of AOM. We discussed benefits and risks of GLP1 agonists and phentermine/ topiramate. She has looked at insurance and doesn't have coverage for weight loss medications. GLP1 will likely be cost prohibitive.  For now, we will start with diet and lifestyle changes. Has concerns about being on meds \"forever\" and potential side effects.     Describes really busy schedule and difficulty planning ahead. Describes a lot of enjoyment of food and cravings making it difficult to adhere to any plan previously tried. She also notes hunger most of the day. Recently trying to bring food to work which has been beneficial.     Plans:   Get labs done   Start working with dietitian   Goals would be increasing protein and decreasing carbs  Start assessing hunger before snacking   Keep up the great work with exercise  Follow up 2 months   Read about topiramate and phentermine           Other Visit Diagnoses       Abnormal liver ultrasound        Liver lesion        NAFLD (nonalcoholic fatty liver disease)                 She has the following co-morbidities:        4/3/2023     9:39 PM   --   I have the following health issues " "associated with obesity Fatty Liver    Hypothyroidism   I have the following symptoms associated with obesity None of the above            View : No data to display.                    4/3/2023     9:39 PM   Referring Provider   Please name the provider who referred you to Medical Weight Management  If you do not know, please answer \"I Don't Know\" Dr. Moore           4/3/2023     9:39 PM   Weight History   How concerned are you about your weight? Somewhat Concerned   I became overweight As an Adult   The following factors have contributed to my weight gain Started on Medication that Caused Weight Gain    Eating Wrong Types of Food    Eating Too Much    Lack of Exercise    Genetic (Runs in the Family)    Stress   I have tried the following methods to lose weight Watching Portions or Calories    Exercise    Fasting   I have the following family history of obesity/being overweight My mother is overweight    My father is overweight    Many of my relatives are overweight      Never thin   30lb gain in college, maybe around time of a specific birth control  2 pregnancies- lost weight with both - gestational diabetes     Never able to lose more than 20lb - can't stick to a plan - time, enjoys food     Has lost about 9lb since initial consult - going to gym on some lunch breaks and trying to eat healthier         4/3/2023     9:39 PM   Diet Recall Review with Patient   How often do you have a drink of alcohol? Monthly or Less   If you do drink, how many drinks might you have in a day? 1 or 2           4/3/2023     9:39 PM   Eating Habits   Generally, my meals include foods like these bread, pasta, rice, potatoes, corn, crackers, sweet dessert, pop, or juice Once a Week   Generally, my meals include foods like these fried meats, brats, burgers, french fries, pizza, cheese, chips, or ice cream Once a Week   Eat fast food (like McDonalds, Burger Suresh, Taco Bell) Less Than Weekly   Eat at a buffet or sit-down restaurant Once a " Week   Eat most of my meals in front of the TV or computer Never   Often skip meals, eat at random times, have no regular eating times Almost Everyday   Rarely sit down for a meal but snack or graze throughout Less Than Weekly   Eat extra snacks between meals Less Than Weekly   Eat most of my food at the end of the day A Few Times a Week   Eat in the middle of the night or wake up at night to eat Never   Eat extra snacks to prevent or correct low blood sugar Never   Eat to prevent acid reflux or stomach pain Never   Worry about not having enough food to eat Never   I eat when I am depressed Never   I eat when I am stressed A Few Times a Week   I eat when I am bored A Few Times a Week   I eat when I am anxious Never   I eat when I am happy or as a reward A Few Times a Week   I feel hungry all the time even if I just have eaten Less Than Weekly   Feeling full is important to me Everyday   I finish all the food on my plate even if I am already full A Few Times a Week   I can't resist eating delicious food or walk past the good food/smell A Few Times a Week   I eat/snack without noticing that I am eating Less Than Weekly   I eat when I am preparing the meal A Few Times a Week   I eat more than usual when I see others eating Less Than Weekly   I have trouble not eating sweets, ice cream, cookies, or chips if they are around the house A Few Times a Week   I think about food all day Less Than Weekly   What foods, if any, do you crave? Chips/Crackers      Trying to eat more lean meats and baked foods   Enjoys food   Really busy so that makes it difficult to plan  Important for her to enjoy foods   Better when busy at work - more hunger out side of work   recently started brining food to work instead of purchasing foods from restaurant         4/3/2023     9:39 PM   Amount of Food   I make myself vomit what I have eaten or use laxatives to get rid of food Never   I eat a large amount of food, like a loaf of bread, a box of  cookies, a pint/quart of ice cream, all at once Never   I eat a large amount of food even when I am not hungry Never   I eat rapidly Never   I eat alone because I feel embarrassed and do not want others to see how much I have eaten Never   I eat until I am uncomfortably full Monthly   I feel bad, disgusted, or guilty after I overeat Never           4/3/2023     9:39 PM   Activity/Exercise History   How much of a typical 12 hour day do you spend sitting? Most of the Day   How much of a typical 12 hour day do you spend lying down? Less Than Half the Day   How much of a typical day do you spend walking/standing? Less Than Half the Day   How many hours (not including work) do you spend on the TV/Video Games/Computer/Tablet/Phone? 2-3 Hours   How many times a week are you active for the purpose of exercise? 2-3 Times a Week   What keeps you from being more active? Lack of Time   How many total minutes do you spend doing some activity for the purpose of exercising when you exercise? 15-30 Minutes       PAST MEDICAL HISTORY:  Past Medical History:   Diagnosis Date    Cyst of right ovary     11/11/2016    Diet controlled gestational diabetes mellitus (GDM) in third trimester     Herpesviral vesicular dermatitis     11/11/2016    Hypothyroidism     11/9/2015    Melanoma of skin (H) 01/2021    NAFLD (nonalcoholic fatty liver disease)            4/3/2023     9:39 PM   Work/Social History Reviewed With Patient   My employment status is Full-Time   How much of your job is spent on the computer or phone? 100%   What is your marital status? /In a Relationship   If in a relationship, is your significant other overweight? No   If you have children, are they overweight? No           4/3/2023     9:39 PM   Mental Health History Reviewed With Patient   How often in the past 2 weeks have you felt little interest or pleasure in doing things? Not at all   Over the past 2 weeks how often have you felt down, depressed, or hopeless?  Not at all     Sleep is okay - limited by time          View : No data to display.                MEDICATIONS:   Current Outpatient Medications   Medication Sig Dispense Refill    acyclovir (ZOVIRAX) 800 MG tablet Take 1 tablet (800 mg) by mouth 5 times daily 50 tablet 3    ALPRAZolam (XANAX) 1 MG tablet Take 1 by mouth 30 minutes prior to MRI.  May repeat x 1 if needed. 2 tablet 0    fluticasone (FLONASE) 50 MCG/ACT nasal spray Spray 2 sprays into both nostrils daily (Patient taking differently: Spray 2 sprays into both nostrils daily as needed) 18.2 mL 11    levothyroxine (SYNTHROID/LEVOTHROID) 137 MCG tablet Take 1 tablet (137 mcg) by mouth daily 90 tablet 4    Prenatal Vit-Fe Fumarate-FA (PRENATAL MULTIVITAMIN W/IRON) 27-0.8 MG tablet Take 1 tablet by mouth daily 90 tablet 3       ALLERGIES:   Allergies   Allergen Reactions    Amoxicillin Hives     Childhood       Office Visit on 03/15/2023   Component Date Value Ref Range Status    hCG Urine Qualitative 03/15/2023 Negative  Negative Final    This test is for screening purposes.  Results should be interpreted along with the clinical picture.  Confirmation testing is available if warranted by ordering [id number], HCG Quantitative Pregnancy.       Anti-obesity medication ROS:    HEENT  Hx of glaucoma: No    Cardiovascular  CAD:No  HTN:No    Gastrointestinal  GERD:occasional, worse with weight gain   Constipation/diarrhea/GI issues:No  Liver Dz:Yes  FIB-4 Calculation: 0.31 at 12/27/2022  3:45 PM  Calculated from:  SGOT/AST: 16 U/L at 12/27/2022  3:45 PM  SGPT/ALT: 20 U/L at 12/27/2022  3:45 PM  Platelets: 381 10e3/uL at 12/27/2022  3:45 PM  Age: 32 years    H/O Pancreatitis:No    Psychiatric  Bipolar: No  Anxiety:No  Depression:No  History of alcohol/drug abuse: No  Hx of eating disorder:No    Endocrine  Personal or family hx of MTC or MEN2:No  Diabetes/prediabetes: No    Neurologic:  Hx of seizures: No  Hx of migraines: No  Memory Impairment: No  Chronic pain/opioids  "use: No      History of kidney stones: No  Kidney disease: No  Current birth control: mirena     PHYSICAL EXAM:  Objective    Ht 1.638 m (5' 4.5\")   Wt 87.5 kg (193 lb)   BMI 32.62 kg/m    Physical Exam   GENERAL: Healthy, alert and no distress  EYES: Eyes grossly normal to inspection.  No discharge or erythema, or obvious scleral/conjunctival abnormalities.  RESP: No audible wheeze, cough, or visible cyanosis.  No visible retractions or increased work of breathing.    SKIN: Visible skin clear. No significant rash, abnormal pigmentation or lesions.  NEURO: Cranial nerves grossly intact.  Mentation and speech appropriate for age.  PSYCH: Mentation appears normal, affect normal/bright, judgement and insight intact, normal speech and appearance well-groomed.    FIB-4 Calculation: 0.31 at 12/27/2022  3:45 PM  Calculated from:  SGOT/AST: 16 U/L at 12/27/2022  3:45 PM  SGPT/ALT: 20 U/L at 12/27/2022  3:45 PM  Platelets: 381 10e3/uL at 12/27/2022  3:45 PM  Age: 32 years    Fib-4 < 1.3: No further evaluation at this point, unless other concerns  - If the Fib-4 is > 2.67,  Fibroscan and elective liver clinic referral  - Intermediate Fib-4 scores: Get a Fibroscan, consider repeating this in 1-2 years.    Sincerely,    Claudette Fajardo NP    "

## 2023-04-06 ENCOUNTER — VIRTUAL VISIT (OUTPATIENT)
Dept: ENDOCRINOLOGY | Facility: CLINIC | Age: 33
End: 2023-04-06
Payer: COMMERCIAL

## 2023-04-06 DIAGNOSIS — Z71.3 NUTRITIONAL COUNSELING: Primary | ICD-10-CM

## 2023-04-06 DIAGNOSIS — E66.811 CLASS 1 OBESITY WITH SERIOUS COMORBIDITY AND BODY MASS INDEX (BMI) OF 32.0 TO 32.9 IN ADULT, UNSPECIFIED OBESITY TYPE: ICD-10-CM

## 2023-04-06 DIAGNOSIS — K76.0 NAFLD (NONALCOHOLIC FATTY LIVER DISEASE): ICD-10-CM

## 2023-04-06 PROCEDURE — 97802 MEDICAL NUTRITION INDIV IN: CPT | Mod: VID | Performed by: DIETITIAN, REGISTERED

## 2023-04-06 PROCEDURE — 99207 PR NO CHARGE LOS: CPT | Mod: VID | Performed by: DIETITIAN, REGISTERED

## 2023-04-06 NOTE — PROGRESS NOTES
"Amanda Mckeon is a 32 year old female who is being evaluated via a billable video visit.      The patient has been notified of following:     \"This video visit will be conducted via a call between you and your physician/provider. We have found that certain health care needs can be provided without the need for an in-person physical exam.  This service lets us provide the care you need with a video conversation.  If a prescription is necessary we can send it directly to your pharmacy.  If lab work is needed we can place an order for that and you can then stop by our lab to have the test done at a later time.    Video visits are billed at different rates depending on your insurance coverage.  Please reach out to your insurance provider with any questions.    If during the course of the call the physician/provider feels a video visit is not appropriate, you will not be charged for this service.\"    Patient has given verbal consent for Video visit? Yes  How would you like to obtain your AVS? MyChart  If you are dropped from the video visit, the video invite should be resent to: Text to cell phone: [phone]  Will anyone else be joining your video visit? No  {If patient encounters technical issues they should call [phone]      Video-Visit Details    Type of service:  Video Visit    Video Start Time: 7:59 am   Video End Time: 9:08 am    Originating Location (pt. Location): Home    Distant Location (provider location):  Offsite (providers home)     Platform used for Video Visit: V[employer]    During this virtual visit the patient is located in [place], patient verifies this as the location during the entirety of this visit.     New Weight Management Nutrition Consultation    Amanda Mckeon is a 32 year old female presents today for new weight management nutrition consultation.  Patient referred by Claudette Fajardo NP on April 5, 2023.    Patient with Co-morbidities of obesity including:  Type II DM no, hx of GDM  Renal Failure " "no  Sleep apnea no  Hypertension no   Dyslipidemia yes  Joint pain no  Back pain no  GERD no     PMH: fatty liver, hypothyroidism    Anthropometrics:  Weight 23: 204 lbs   Weight when starting with CWMP 23: 193 lbs with BMI 32.62    Estimated body mass index is 32.62 kg/m  as calculated from the following:    Height as of 23: 1.638 m (5' 4.5\").    Weight as of 23: 87.5 kg (193 lb).     Patient goal: ~150 lbs    Starting goal: ~175 lbs    Medications for Weight Loss:  None at this time - limited insurance coverage if any and pt would like to start with diet/lifestyle changes    Supplements:  Pre-destinee     Lab orders placed yesterday     NUTRITION HISTORY  NKFA  Does eat meat and seafood    Patient was referred by hepatology. Recommended weight loss for treatment of hepatic steatosis. Went to PCP for pain - found small hernia then found fatty liver per pt.     Has seen a RD before for GDM.     Pt goals: lose weight, improve health as a whole (Fatty liver in particular)     She is starting to make changes already such as eating more lean meats and foods from home as opposed to buying food at work. Feeling hungry all the time with making changes and missing the good foods. Craving burgers, Chinese food, and pizza. Trying to eat more chicken and salads.     Currently doing IF  L : tuna sandwich (whole wheat low carb rosalind bread, 1/2 can tuna, 1 Tbsp light miracle whip, pickles, onions) and 1 oz baked potato chips  D: smaller portions - last night had shrimp and a salad. Other examples: tater tot hot dish, chicken and salad with buffalo ranch, lasagna   Snacks: too good yogurt, raw veggies with a little dip (light sour cream/ranch dip), string cheese and beef stick     PA: gym at work (3 miles on stair stepper) ordered sean toney     Barriers: busy schedule (makes it difficult to plan meals), hunger, cravings     Additional information:    No household food allergies as of now     1 and 3 year old at " home  Not BF currently    Nutrition Prescription  Recommended energy/nutrient modification.    Nutrition Diagnosis  Obesity r/t long history of positive energy balance aeb BMI >30.    Nutrition Intervention  Reviewed current dietary habits and pts history   Discussed long-term goals pt hopes to accomplish in RD appointments  Answered pt questions  Coordination of care   Nutrition education - Liver health, types of dietary fat, whole grains, sources of carbohydrates, protein, importance of eating enough food  AVS and handouts via [employer]    Patient demonstrates understanding.    Expected Engagement: good    Nutrition Resources:    Estimated recommended needs for weight loss  ? 1.5 cups fruit/day  ? 2.5+ cups vegetables   ? 6 oz protein  (1 oz = 1 egg, 1 Tbsp peanut butter, 1/4 cup cooked beans, peas, lentils)   ? 6 servings grain (1 serving = 1 slice bread, 1/2 cup cooked rice/pasta/cereal   ? 3 servings dairy (1 serving = 1 cup milk, 1 cup yogurt, 1.5 oz hard cheese, 1 cup cottage cheese, 1/3 cup shredded cheese)     Faster meal component ideas:  ? Hard boiled eggs  ? Tuna   ? Roit Chicken  ? Frozen shrimp  ? Grain packs (examples shown)  ? Canned beans   ? Protein + pasta   ? Veggies Made Great (egg bites, muffins, etc)     Protein ideas:  ? Chicken  ? Turkey  ? Fish  ? Lean cuts of beef (93% ground, sirloin, tenderloin, etc)  ? Pork (limit fatter options like aceves)  ? Cottage cheese  ? Greek yogurt  ? Eggs   ? Low-fat Cheese  ? Lentils/beans  ? Nuts/nut butter (recommend a smaller portion if doing these options - 2 Tbsp of nuts or 1/4 cup nut butter)  ? Tofu  ? Seitan     Carbohydrate ideas:   - Brown/Wild rice   - Egg life wrap (low carb and great source of protein!)    - Whole wheat pasta   - Quinoa    - Protein pasta (barilla protein +, lentil pasta, etc)   - Whole wheat or carb balance tortilla   - Oatmeal    - Fruit   - Starchy vegetables (corn, peas, beans/lentils, potatoes)   - Whole grain crackers    -  Whole wheat waffle    - Superior protein waffles/pancakes    - Couscous       Non-starchy Vegetables ideas:   - Lettuce   - Spinach   - Bell pepper   - Carrots   - Broccoli   - Cabbage   - Brussel sprouts   - Cauliflower    - Asparagus    - Cucumber   - Artichoke   - Zucchini   - Bean sprouts   - Mushrooms   - Sugar snap peas   - Eggplant   - Turnips   - Celery   - Radishes    - Green beans    Types of dietary fats  [email or web address]-should-[email or web address]-eat/fats-and-cholesterol/types-of-fat/    Liver Health:  - Antioxidant rich diet  - Omega 3 fatty acid rich diet (salmon, walnuts, tuna, flax seeds, Marino seeds)  - Foods with probiotics and prebiotics  - Limit added fructose (naturally occurring in fruit is okay)  - Low sodium   - Fiber (soluble - oats, beans, lentils, flaxseed vs insoluble,  add bulk and speed as food passes through the G.I. tract found in nuts, bran, brown rice, skin of fruits)     Follow-Up:  4 weeks recommedned    Time spent with patient: 69 minutes.  JULI Briggs, RD, LD

## 2023-04-06 NOTE — LETTER
"4/6/2023       RE: Amanda Mckeon  [id number] [facility] [id number]     Dear Colleague,    Thank you for referring your patient, Amanda Mckeon, to the [facility] [place] at [facility]. Please see a copy of my visit note below.    Amanda Mckeon is a 32 year old female who is being evaluated via a billable video visit.      The patient has been notified of following:     \"This video visit will be conducted via a call between you and your physician/provider. We have found that certain health care needs can be provided without the need for an in-person physical exam.  This service lets us provide the care you need with a video conversation.  If a prescription is necessary we can send it directly to your pharmacy.  If lab work is needed we can place an order for that and you can then stop by our lab to have the test done at a later time.    Video visits are billed at different rates depending on your insurance coverage.  Please reach out to your insurance provider with any questions.    If during the course of the call the physician/provider feels a video visit is not appropriate, you will not be charged for this service.\"    Patient has given verbal consent for Video visit? Yes  How would you like to obtain your AVS? MyChart  If you are dropped from the video visit, the video invite should be resent to: Text to cell phone: [phone]  Will anyone else be joining your video visit? No  {If patient encounters technical issues they should call [phone]      Video-Visit Details    Type of service:  Video Visit    Video Start Time: 7:59 am   Video End Time: 9:08 am    Originating Location (pt. Location): Home    Distant Location (provider location):  Offsite (providers home)     Platform used for Video Visit: No.1 Traveller    During this virtual visit the patient is located in [place], patient verifies this as the location during the " "entirety of this visit.     New Weight Management Nutrition Consultation    Amanda Mckeon is a 32 year old female presents today for new weight management nutrition consultation.  Patient referred by Claudette Fajardo NP on 2023.    Patient with Co-morbidities of obesity including:  Type II DM no, hx of GDM  Renal Failure no  Sleep apnea no  Hypertension no   Dyslipidemia yes  Joint pain no  Back pain no  GERD no     PMH: fatty liver, hypothyroidism    Anthropometrics:  Weight 23: 204 lbs   Weight when starting with CWMP 23: 193 lbs with BMI 32.62    Estimated body mass index is 32.62 kg/m  as calculated from the following:    Height as of 23: 1.638 m (5' 4.5\").    Weight as of 23: 87.5 kg (193 lb).     Patient goal: ~150 lbs    Starting goal: ~175 lbs    Medications for Weight Loss:  None at this time - limited insurance coverage if any and pt would like to start with diet/lifestyle changes    Supplements:  Pre-     Lab orders placed yesterday     NUTRITION HISTORY  NKFA  Does eat meat and seafood    Patient was referred by hepatology. Recommended weight loss for treatment of hepatic steatosis. Went to PCP for pain - found small hernia then found fatty liver per pt.     Has seen a RD before for GDM.     Pt goals: lose weight, improve health as a whole (Fatty liver in particular)     She is starting to make changes already such as eating more lean meats and foods from home as opposed to buying food at work. Feeling hungry all the time with making changes and missing the good foods. Craving burgers, Chinese food, and pizza. Trying to eat more chicken and salads.     Currently doing IF  L : tuna sandwich (whole wheat low carb rosalind bread, 1/2 can tuna, 1 Tbsp light miracle whip, pickles, onions) and 1 oz baked potato chips  D: smaller portions - last night had shrimp and a salad. Other examples: tater tot hot dish, chicken and salad with buffalo ranch, lasagna   Snacks: too good yogurt, raw " veggies with a little dip (light sour cream/ranch dip), string cheese and beef stick     PA: gym at work (3 miles on stair stepper) kaylen toney     Barriers: busy schedule (makes it difficult to plan meals), hunger, cravings     Additional information:    No household food allergies as of now     1 and 3 year old at home  Not BF currently    Nutrition Prescription  Recommended energy/nutrient modification.    Nutrition Diagnosis  Obesity r/t long history of positive energy balance aeb BMI >30.    Nutrition Intervention  Reviewed current dietary habits and pts history   Discussed long-term goals pt hopes to accomplish in RD appointments  Answered pt questions  Coordination of care   Nutrition education - Liver health, types of dietary fat, whole grains, sources of carbohydrates, protein, importance of eating enough food  AVS and handouts via AppM[employer]    Patient demonstrates understanding.    Expected Engagement: good    Nutrition Resources:    Estimated recommended needs for weight loss  1.5 cups fruit/day  2.5+ cups vegetables   6 oz protein  (1 oz = 1 egg, 1 Tbsp peanut butter, 1/4 cup cooked beans, peas, lentils)   6 servings grain (1 serving = 1 slice bread, 1/2 cup cooked rice/pasta/cereal   3 servings dairy (1 serving = 1 cup milk, 1 cup yogurt, 1.5 oz hard cheese, 1 cup cottage cheese, 1/3 cup shredded cheese)     Faster meal component ideas:  Hard boiled eggs  Tuna   Roit Chicken  Frozen shrimp  Grain packs (examples shown)  Canned beans   Protein + pasta   Veggies Made Great (egg bites, muffins, etc)     Protein ideas:  Chicken  [place]  Fish  Lean cuts of beef (93% ground, sirloin, tenderloin, etc)  Pork (limit fatter options like aceves)  Cottage cheese  Greek yogurt  Eggs   Low-fat Cheese  Lentils/beans  Nuts/nut butter (recommend a smaller portion if doing these options - 2 Tbsp of nuts or 1/4 cup nut butter)  Tofu  Seitan     Carbohydrate ideas:   - Brown/Wild rice   - Egg life wrap (low carb and  great source of protein!)    - Whole wheat pasta   - Quinoa    - Protein pasta (barilla protein +, lentil pasta, etc)   - Whole wheat or carb balance tortilla   - Oatmeal    - Fruit   - Starchy vegetables (corn, peas, beans/lentils, potatoes)   - Whole grain crackers    - Whole wheat waffle    - [place] protein waffles/pancakes    - Couscous       Non-starchy Vegetables ideas:   - Lettuce   - Spinach   - Bell pepper   - Carrots   - Broccoli   - Cabbage   - Brussel sprouts   - Cauliflower    - Asparagus    - Cucumber   - Artichoke   - Zucchini   - Bean sprouts   - Mushrooms   - Sugar snap peas   - Eggplant   - Turnips   - Celery   - Radishes    - Green beans    Types of dietary fats  [email or web address]/nutritionsource/what-should-you-eat/fats-and-cholesterol/types-of-fat/    Liver Health:  - Antioxidant rich diet  - Omega 3 fatty acid rich diet (salmon, walnuts, tuna, flax seeds, Marino seeds)  - Foods with probiotics and prebiotics  - Limit added fructose (naturally occurring in fruit is okay)  - Low sodium   - Fiber (soluble - oats, beans, lentils, flaxseed vs insoluble,  add bulk and speed as food passes through the G.I. tract found in nuts, bran, brown rice, skin of fruits)     Follow-Up:  4 weeks recommedned    Time spent with patient: 69 minutes.  JULI Briggs, RD, LD

## 2023-04-07 ENCOUNTER — TELEPHONE (OUTPATIENT)
Dept: ENDOCRINOLOGY | Facility: CLINIC | Age: 33
End: 2023-04-07
Payer: COMMERCIAL

## 2023-04-07 NOTE — TELEPHONE ENCOUNTER
JACQUES and sent myc for scheduling return in about 2 months (around 6/5/2023) with Claudette Fajardo.

## 2023-04-11 NOTE — PATIENT INSTRUCTIONS
Nutrition Resources:    Estimated recommended needs for weight loss  1.5 cups fruit/day  2.5+ cups vegetables   6 oz protein  (1 oz = 1 egg, 1 Tbsp peanut butter, 1/4 cup cooked beans, peas, lentils)   6 servings grain (1 serving = 1 slice bread, 1/2 cup cooked rice/pasta/cereal   3 servings dairy (1 serving = 1 cup milk, 1 cup yogurt, 1.5 oz hard cheese, 1 cup cottage cheese, 1/3 cup shredded cheese)     Faster meal component ideas:  Hard boiled eggs  Tuna   Roit Chicken  Frozen shrimp  Grain packs (examples shown)  Canned beans   Protein + pasta   Veggies Made Great (egg bites, muffins, etc)     Protein ideas:  Chicken  [place]  Fish  Lean cuts of beef (93% ground, sirloin, tenderloin, etc)  Pork (limit fatter options like aceves)  Cottage cheese  Greek yogurt  Eggs   Low-fat Cheese  Lentils/beans  Nuts/nut butter (recommend a smaller portion if doing these options - 2 Tbsp of nuts or 1/4 cup nut butter)  Tofu  Seitan     Carbohydrate ideas:   - Brown/Wild rice   - Egg life wrap (low carb and great source of protein!)    - Whole wheat pasta   - Quinoa    - Protein pasta (barilla protein +, lentil pasta, etc)   - Whole wheat or carb balance tortilla   - Oatmeal    - Fruit   - Starchy vegetables (corn, peas, beans/lentils, potatoes)   - Whole grain crackers    - Whole wheat waffle    - [place] protein waffles/pancakes    - Couscous       Non-starchy Vegetables ideas:   - Lettuce   - Spinach   - Bell pepper   - Carrots   - Broccoli   - Cabbage   - Brussel sprouts   - Cauliflower    - Asparagus    - Cucumber   - Artichoke   - Zucchini   - Bean sprouts   - Mushrooms   - Sugar snap peas   - Eggplant   - Turnips   - Celery   - Radishes    - Green beans    Types of dietary fats  https://[email or web address]/nutritionsource/what-should-you-eat/fats-and-cholesterol/types-of-fat/    Liver Health:  - Antioxidant rich diet  - Omega 3 fatty acid rich diet (salmon, walnuts, tuna, flax seeds, Marino seeds)  - Foods with probiotics and  prebiotics  - Limit added fructose (naturally occurring in fruit is okay)  - Low sodium   - Fiber (soluble - oats, beans, lentils, flaxseed vs insoluble,  add bulk and speed as food passes through the G.I. tract found in nuts, bran, brown rice, skin of fruits)     Follow-Up:  4 weeks recommended    JULI Trinidad, RD, LD  Clinic #: [phone]

## 2023-04-13 ENCOUNTER — TELEPHONE (OUTPATIENT)
Dept: ENDOCRINOLOGY | Facility: CLINIC | Age: 33
End: 2023-04-13
Payer: COMMERCIAL

## 2023-04-13 NOTE — TELEPHONE ENCOUNTER
JACQUES and sent mychart for scheduling Return in about 2 months (around 6/5/2023) with Claudette Fajardo, and Return in about 4 weeks (around 5/4/2023) with Clarissa Sommer (Please schedule for 60 minutes - pt needs additional time)

## 2023-04-28 ENCOUNTER — LAB (OUTPATIENT)
Dept: LAB | Facility: OTHER | Age: 33
End: 2023-04-28
Attending: FAMILY MEDICINE
Payer: COMMERCIAL

## 2023-04-28 DIAGNOSIS — E03.9 HYPOTHYROIDISM, UNSPECIFIED TYPE: ICD-10-CM

## 2023-04-28 LAB
HOLD SPECIMEN: NORMAL
HOLD SPECIMEN: NORMAL
TSH SERPL DL<=0.005 MIU/L-ACNC: 0.88 UIU/ML (ref 0.3–4.2)

## 2023-04-28 PROCEDURE — 84443 ASSAY THYROID STIM HORMONE: CPT | Mod: ZL

## 2023-04-28 PROCEDURE — 36415 COLL VENOUS BLD VENIPUNCTURE: CPT | Mod: ZL

## 2023-05-06 ENCOUNTER — HEALTH MAINTENANCE LETTER (OUTPATIENT)
Age: 33
End: 2023-05-06

## 2023-05-21 ENCOUNTER — HOSPITAL ENCOUNTER (EMERGENCY)
Facility: OTHER | Age: 33
Discharge: HOME | End: 2023-05-21
Payer: COMMERCIAL

## 2023-05-21 VITALS
TEMPERATURE: 97.9 F | OXYGEN SATURATION: 98 % | DIASTOLIC BLOOD PRESSURE: 76 MMHG | HEART RATE: 93 BPM | HEIGHT: 64 IN | BODY MASS INDEX: 30.9 KG/M2 | RESPIRATION RATE: 14 BRPM | WEIGHT: 181 LBS | SYSTOLIC BLOOD PRESSURE: 132 MMHG

## 2023-05-21 NOTE — ED TRIAGE NOTES
"Patient started having lower right abd pain yesterday.  Patient denies fever, some nausea last night.  Patient does have a history of ovarian cyst and does still have her appendix she states./76   Pulse 93   Temp 97.9  F (36.6  C)   Resp 14   Ht 1.626 m (5' 4\")   Wt 82.1 kg (181 lb)   SpO2 98%   BMI 31.07 kg/m         Triage Assessment     Row Name 05/21/23 4526       Triage Assessment (Adult)    Airway WDL WDL       Respiratory WDL    Respiratory WDL WDL       Skin Circulation/Temperature WDL    Skin Circulation/Temperature WDL WDL       Cardiac WDL    Cardiac WDL WDL       Peripheral/Neurovascular WDL    Peripheral Neurovascular WDL WDL       Cognitive/Neuro/Behavioral WDL    Cognitive/Neuro/Behavioral WDL WDL              "

## 2023-05-22 ENCOUNTER — OFFICE VISIT (OUTPATIENT)
Dept: FAMILY MEDICINE | Facility: OTHER | Age: 33
End: 2023-05-22
Attending: PHYSICIAN ASSISTANT
Payer: COMMERCIAL

## 2023-05-22 ENCOUNTER — MYC MEDICAL ADVICE (OUTPATIENT)
Dept: FAMILY MEDICINE | Facility: OTHER | Age: 33
End: 2023-05-22
Payer: COMMERCIAL

## 2023-05-22 VITALS
HEART RATE: 94 BPM | OXYGEN SATURATION: 98 % | WEIGHT: 185 LBS | BODY MASS INDEX: 31.76 KG/M2 | RESPIRATION RATE: 17 BRPM | DIASTOLIC BLOOD PRESSURE: 72 MMHG | TEMPERATURE: 98.9 F | SYSTOLIC BLOOD PRESSURE: 126 MMHG

## 2023-05-22 DIAGNOSIS — R10.31 RLQ ABDOMINAL PAIN: Primary | ICD-10-CM

## 2023-05-22 PROBLEM — Z86.006 HISTORY OF MELANOMA IN SITU: Status: ACTIVE | Noted: 2021-05-21

## 2023-05-22 LAB
ALBUMIN SERPL BCG-MCNC: 5.2 G/DL (ref 3.5–5.2)
ALP SERPL-CCNC: 80 U/L (ref 35–104)
ALT SERPL W P-5'-P-CCNC: 14 U/L (ref 10–35)
ANION GAP SERPL CALCULATED.3IONS-SCNC: 13 MMOL/L (ref 7–15)
AST SERPL W P-5'-P-CCNC: 13 U/L (ref 10–35)
BASOPHILS # BLD AUTO: 0.1 10E3/UL (ref 0–0.2)
BASOPHILS NFR BLD AUTO: 1 %
BILIRUB SERPL-MCNC: 0.3 MG/DL
BUN SERPL-MCNC: 9.7 MG/DL (ref 6–20)
CALCIUM SERPL-MCNC: 9.9 MG/DL (ref 8.6–10)
CHLORIDE SERPL-SCNC: 102 MMOL/L (ref 98–107)
CREAT SERPL-MCNC: 0.71 MG/DL (ref 0.51–0.95)
DEPRECATED HCO3 PLAS-SCNC: 23 MMOL/L (ref 22–29)
EOSINOPHIL # BLD AUTO: 0.1 10E3/UL (ref 0–0.7)
EOSINOPHIL NFR BLD AUTO: 1 %
ERYTHROCYTE [DISTWIDTH] IN BLOOD BY AUTOMATED COUNT: 12.1 % (ref 10–15)
GFR SERPL CREATININE-BSD FRML MDRD: >90 ML/MIN/1.73M2
GLUCOSE SERPL-MCNC: 111 MG/DL (ref 70–99)
HCG UR QL: NEGATIVE
HCT VFR BLD AUTO: 40.3 % (ref 35–47)
HGB BLD-MCNC: 13.9 G/DL (ref 11.7–15.7)
HOLD SPECIMEN: NORMAL
IMM GRANULOCYTES # BLD: 0.1 10E3/UL
IMM GRANULOCYTES NFR BLD: 1 %
LYMPHOCYTES # BLD AUTO: 2.4 10E3/UL (ref 0.8–5.3)
LYMPHOCYTES NFR BLD AUTO: 26 %
MCH RBC QN AUTO: 30.1 PG (ref 26.5–33)
MCHC RBC AUTO-ENTMCNC: 34.5 G/DL (ref 31.5–36.5)
MCV RBC AUTO: 87 FL (ref 78–100)
MONOCYTES # BLD AUTO: 0.6 10E3/UL (ref 0–1.3)
MONOCYTES NFR BLD AUTO: 6 %
NEUTROPHILS # BLD AUTO: 6.2 10E3/UL (ref 1.6–8.3)
NEUTROPHILS NFR BLD AUTO: 65 %
NRBC # BLD AUTO: 0 10E3/UL
NRBC BLD AUTO-RTO: 0 /100
PLATELET # BLD AUTO: 350 10E3/UL (ref 150–450)
POTASSIUM SERPL-SCNC: 3.8 MMOL/L (ref 3.4–5.3)
PROT SERPL-MCNC: 7.9 G/DL (ref 6.4–8.3)
RBC # BLD AUTO: 4.62 10E6/UL (ref 3.8–5.2)
SODIUM SERPL-SCNC: 138 MMOL/L (ref 136–145)
WBC # BLD AUTO: 9.5 10E3/UL (ref 4–11)

## 2023-05-22 PROCEDURE — 85025 COMPLETE CBC W/AUTO DIFF WBC: CPT | Mod: ZL | Performed by: PHYSICIAN ASSISTANT

## 2023-05-22 PROCEDURE — 99213 OFFICE O/P EST LOW 20 MIN: CPT | Performed by: PHYSICIAN ASSISTANT

## 2023-05-22 PROCEDURE — 81025 URINE PREGNANCY TEST: CPT | Mod: ZL | Performed by: PHYSICIAN ASSISTANT

## 2023-05-22 PROCEDURE — 80053 COMPREHEN METABOLIC PANEL: CPT | Mod: ZL | Performed by: PHYSICIAN ASSISTANT

## 2023-05-22 PROCEDURE — 36415 COLL VENOUS BLD VENIPUNCTURE: CPT | Mod: ZL | Performed by: PHYSICIAN ASSISTANT

## 2023-05-22 ASSESSMENT — PAIN SCALES - GENERAL: PAINLEVEL: MILD PAIN (3)

## 2023-05-22 NOTE — NURSING NOTE
"Chief Complaint   Patient presents with     Pain   patient is here for right sided lower quadrant pain. Patient also had bloating and this started Saturday.     Initial /72   Pulse 94   Temp 98.9  F (37.2  C)   Resp 17   Wt 83.9 kg (185 lb)   SpO2 98%   Breastfeeding No   BMI 31.76 kg/m   Estimated body mass index is 31.76 kg/m  as calculated from the following:    Height as of 5/21/23: 1.626 m (5' 4\").    Weight as of this encounter: 83.9 kg (185 lb).  Medication Reconciliation: complete    FOOD SECURITY SCREENING QUESTIONS  Hunger Vital Signs:  Within the past 12 months we worried whether our food would run out before we got money to buy more. Never  Within the past 12 months the food we bought just didn't last and we didn't have money to get more. Never  Shannan Marquez LPN 5/22/2023 2:29 PM             Shannan Marquez LPN  "

## 2023-05-22 NOTE — PROGRESS NOTES
Assessment & Plan     1. RLQ abdominal pain  Differential includes ovarian cyst, ovarian torsion, uterine fibroid, vaginal tract infection, urinary tract infection, renal calculi, renal cyst, appendicitis, colitis, constipation, muscle strain, other musculoskeletal cause, etc.  Vitals stable, exam showing right lower quadrant/right-sided pelvic pain.  Discussed options including lab work, urine, wet prep, imaging.  Patient in agreement with pursuing lab work and pregnancy rule out.  If unremarkable continue to monitor symptoms and treat symptomatically.  If symptoms persist consider follow-up ultrasound.  - CBC and Differential; Future  - Comprehensive Metabolic Panel; Future  - Pregnancy, Urine (HCG)    No follow-ups on file.    Roxi Patricia PA-C  [facility]    Shayne Patel is a 32 year old, presenting for the following health issues:  Pain      History of Present Illness       Reason for visit:  Side pain  Symptom onset:  1-3 days ago    She eats 2-3 servings of fruits and vegetables daily.She consumes 0 sweetened beverage(s) daily.She exercises with enough effort to increase her heart rate 20 to 29 minutes per day.  She exercises with enough effort to increase her heart rate 3 or less days per week. She is missing 1 dose(s) of medications per week.     Here for evaluation of right lower quadrant pain that began over the weekend.  Reports she felt abdominal bloating throughout her middle and lower abdomen on Saturday with associated upset stomach.  Those symptoms resolved but she developed a right lower quadrant pain on Sunday.  She had been struggle with some right upper quadrant abdominal pain on and off for a few months which she has been following with liver specialist for.  Reviewed abdominal ultrasound and abdominal MRI which was largely unrevealing for right lower quadrant changes.  Appendix was not commented on.  Does have Mirena in place.  Reports  had a  vasectomy but he is still within the window of clearance.  Has taken ibuprofen a couple times for symptomatic management.  Does have history of ovarian cysts approximately 10 years ago that resolved on their own.  No associated fever/chills, nausea, vomiting, diarrhea, constipation, urinary symptoms, vaginal itching, burning, discharge.  Denies STD concerns.    PAST MEDICAL HISTORY:   Past Medical History:   Diagnosis Date     Cyst of right ovary     2016     Diet controlled gestational diabetes mellitus (GDM) in third trimester      Herpesviral vesicular dermatitis     2016     Hypothyroidism     2015     Melanoma of skin (H) 2021     NAFLD (nonalcoholic fatty liver disease)        PAST SURGICAL HISTORY:   Past Surgical History:   Procedure Laterality Date     ADENOIDECTOMY      as a child     AS SHOULDER ARTHROSCOPY, DX Right     Dr. Bolivar - age 12      SECTION N/A 8/3/2019    Procedure:  SECTION;  Surgeon: Omar Flower MD;  Location: [facility] OR      SECTION N/A 10/11/2021    Procedure:  SECTION;  Surgeon: Omar Flower MD;  Location:  OR     MAMMOPLASTY REDUCTION           PE TUBES      as a child       FAMILY HISTORY:   Family History   Problem Relation Age of Onset     Family History Negative Mother      Lupus Father      Family History Negative Brother      Kidney Disease Maternal Grandmother      Bladder Cancer Maternal Grandfather      Family History Negative Paternal Grandmother      Diabetes Paternal Grandfather        SOCIAL HISTORY:   Social History     Tobacco Use     Smoking status: Never     Smokeless tobacco: Never   Vaping Use     Vaping status: Never Used     Passive vaping exposure: Yes   Substance Use Topics     Alcohol use: No        Allergies   Allergen Reactions     Amoxicillin Hives     Childhood     Current Outpatient Medications   Medication     acyclovir (ZOVIRAX) 800 MG tablet     fluticasone (FLONASE) 50 MCG/ACT nasal spray      levothyroxine (SYNTHROID/LEVOTHROID) 137 MCG tablet     Prenatal Vit-Fe Fumarate-FA (PRENATAL MULTIVITAMIN W/IRON) 27-0.8 MG tablet     ALPRAZolam (XANAX) 1 MG tablet     No current facility-administered medications for this visit.         Review of Systems   Per HPI        Objective    /72   Pulse 94   Temp 98.9  F (37.2  C)   Resp 17   Wt 83.9 kg (185 lb)   SpO2 98%   Breastfeeding No   BMI 31.76 kg/m    Body mass index is 31.76 kg/m .  Physical Exam   General: Pleasant, in no apparent distress.  Cardiovascular: Regular rate and rhythm with S1 equal to S2. No murmurs, friction rubs, or gallops.   Respiratory: Lungs are resonant and clear to auscultation bilaterally. No wheezes, crackles, or rhonchi.  Abdomen: Abdomen is non-distended. Active bowel sounds heard in all four quadrants. Tenderness to palpation in RLQ and right pelvic region. No rebound tenderness or guarding. No palpable masses noted. No hepatosplenomegaly.  Psych: Appropriate mood and affect.

## 2023-07-24 ENCOUNTER — TELEPHONE (OUTPATIENT)
Dept: GASTROENTEROLOGY | Facility: CLINIC | Age: 33
End: 2023-07-24
Payer: COMMERCIAL

## 2023-07-24 NOTE — TELEPHONE ENCOUNTER
Called to schedule follow up and pt is already schedule through [facility] with another provider// 7.24.23 KET

## 2023-09-28 ENCOUNTER — ALLIED HEALTH/NURSE VISIT (OUTPATIENT)
Dept: FAMILY MEDICINE | Facility: OTHER | Age: 33
End: 2023-09-28
Attending: FAMILY MEDICINE
Payer: COMMERCIAL

## 2023-09-28 DIAGNOSIS — Z48.02 ENCOUNTER FOR REMOVAL OF SUTURES: Primary | ICD-10-CM

## 2023-09-28 NOTE — PROGRESS NOTES
Amanda Mckeon presents to the clinic today for removal of sutures.  The patient has had the sutures in place for 14 days. There has been no history of infection or drainage.  2 sutures are seen located over the left scapula and 1 suture on the right inner thigh above the knee. The wounds are healing well with no signs of infection. All sutures were easily removed today.  Routine wound care discussed. The patient will follow up as needed.    VIOLETTA GREGORIO RN on 9/28/2023 at 4:56 PM

## 2023-10-27 ENCOUNTER — OFFICE VISIT (OUTPATIENT)
Dept: FAMILY MEDICINE | Facility: OTHER | Age: 33
End: 2023-10-27
Attending: FAMILY MEDICINE
Payer: COMMERCIAL

## 2023-10-27 VITALS
TEMPERATURE: 98.9 F | OXYGEN SATURATION: 97 % | DIASTOLIC BLOOD PRESSURE: 80 MMHG | BODY MASS INDEX: 28.84 KG/M2 | SYSTOLIC BLOOD PRESSURE: 122 MMHG | WEIGHT: 168 LBS | RESPIRATION RATE: 16 BRPM | HEART RATE: 64 BPM

## 2023-10-27 DIAGNOSIS — E03.9 HYPOTHYROIDISM, UNSPECIFIED TYPE: ICD-10-CM

## 2023-10-27 DIAGNOSIS — Z30.432 ENCOUNTER FOR IUD REMOVAL: Primary | ICD-10-CM

## 2023-10-27 DIAGNOSIS — Z12.4 SCREENING FOR CERVICAL CANCER: ICD-10-CM

## 2023-10-27 LAB — TSH SERPL DL<=0.005 MIU/L-ACNC: 0.75 UIU/ML (ref 0.3–4.2)

## 2023-10-27 PROCEDURE — 36415 COLL VENOUS BLD VENIPUNCTURE: CPT | Mod: ZL | Performed by: FAMILY MEDICINE

## 2023-10-27 PROCEDURE — 58301 REMOVE INTRAUTERINE DEVICE: CPT | Performed by: FAMILY MEDICINE

## 2023-10-27 PROCEDURE — G0123 SCREEN CERV/VAG THIN LAYER: HCPCS | Performed by: FAMILY MEDICINE

## 2023-10-27 PROCEDURE — 87624 HPV HI-RISK TYP POOLED RSLT: CPT | Mod: ZL | Performed by: FAMILY MEDICINE

## 2023-10-27 PROCEDURE — 84443 ASSAY THYROID STIM HORMONE: CPT | Mod: ZL | Performed by: FAMILY MEDICINE

## 2023-10-27 RX ORDER — FLUTICASONE PROPIONATE 50 MCG
2 SPRAY, SUSPENSION (ML) NASAL DAILY PRN
COMMUNITY
Start: 2023-10-27

## 2023-10-27 ASSESSMENT — PAIN SCALES - GENERAL: PAINLEVEL: NO PAIN (0)

## 2023-10-27 NOTE — PROGRESS NOTES
Nursing Notes:   Meena De Oliveira LPN  10/27/2023  3:13 PM  Signed  Chief Complaint   Patient presents with    IUD     Removal          Medication Reconciliation: complete    Meena De Oliveira LPN      Subjective   Amanda is a 32 year old, presenting for the following health issues:  IUD (Removal )        10/27/2023     3:11 PM   Additional Questions   Roomed by Meena De Oliveira       History of Present Illness       Reason for visit:  Iud removal    She eats 2-3 servings of fruits and vegetables daily.She consumes 0 sweetened beverage(s) daily.She exercises with enough effort to increase her heart rate 20 to 29 minutes per day.  She exercises with enough effort to increase her heart rate 4 days per week. She is missing 1 dose(s) of medications per week.       Amanda is here today to have her IUD removed.  She has 2 children.  Her  recently had a vasectomy.  She no longer needs the hormonal birth control.  She did have some issues with heavier periods previously.  She does have nonalcoholic fatty liver disease as well as multiple hepatic adenomas.  And would prefer to be off of hormonal birth control for this reason.  Her last Pap was completed 1/21/2021 and she would like to do that today as long as she is having a pelvic exam.    She also would like to check her TSH as long as she is here today.  She plans to come back in December for her yearly physical.            Review of Systems   Constitutional, HEENT, cardiovascular, pulmonary, GI, , musculoskeletal, neuro, skin, endocrine and psych systems are negative, except as otherwise noted.      Objective    /80   Pulse 64   Temp 98.9  F (37.2  C) (Tympanic)   Resp 16   Wt 76.2 kg (168 lb)   SpO2 97%   Breastfeeding No   BMI 28.84 kg/m    Body mass index is 28.84 kg/m .  Physical Exam  Constitutional:       Appearance: Normal appearance.   HENT:      Head: Normocephalic.   Eyes:      Extraocular Movements: Extraocular movements intact.      Pupils:  Pupils are equal, round, and reactive to light.   Cardiovascular:      Rate and Rhythm: Normal rate and regular rhythm.      Heart sounds: Normal heart sounds. No murmur heard.  Pulmonary:      Effort: Pulmonary effort is normal.      Breath sounds: Normal breath sounds. No wheezing, rhonchi or rales.   Genitourinary:     Comments: Pelvic Exam:  Vulva: No external lesions, normal hair distribution, no adenopathy  Vagina: Moist, pink, no abnormal discharge, well rugated, no lesions  Cervix: Pap smear is taken, parous, smooth, pink, no visible lesions.  IUD strings are noted protruding from cervical os.  These were grasped with a ring forceps and the IUD was removed without difficulty.  She tolerated this well.    Musculoskeletal:      Cervical back: Normal range of motion and neck supple.   Lymphadenopathy:      Cervical: No cervical adenopathy.   Neurological:      Mental Status: She is alert.   Psychiatric:         Mood and Affect: Mood normal.         Behavior: Behavior normal.        Assessment & Plan     ICD-10-CM    1. Encounter for IUD removal  Z30.432 REMOVE INTRAUTERINE DEVICE      2. Screening for cervical cancer  Z12.4 Pap Screen with HPV - recommended age 30 - 65 years      3. Hypothyroidism, unspecified type  E03.9 TSH with free T4 reflex     TSH with free T4 reflex        1.  IUD removed without difficulty today.  Follow-up as needed.  2.  Pap/HPV updated today.  She will be notified of results when available.  3.  TSH completed today as above.    Encouraged regular exercise.  She declines COVID and flu shots at this time.    No follow-ups on file.    Liza Ordoñez MD  [facility]

## 2023-10-27 NOTE — NURSING NOTE
Chief Complaint   Patient presents with    IUD     Removal          Medication Reconciliation: complete    Meena De Oliveira, LPN

## 2023-11-07 LAB
HUMAN PAPILLOMA VIRUS 16 DNA: NEGATIVE
HUMAN PAPILLOMA VIRUS 18 DNA: NEGATIVE
HUMAN PAPILLOMA VIRUS FINAL DIAGNOSIS: NORMAL
HUMAN PAPILLOMA VIRUS OTHER HR: NEGATIVE

## 2023-11-08 LAB
BKR LAB AP GYN ADEQUACY: NORMAL
BKR LAB AP GYN INTERPRETATION: NORMAL
BKR LAB AP HPV REFLEX: NORMAL
BKR LAB AP PREVIOUS ABNORMAL: NORMAL
PATH REPORT.COMMENTS IMP SPEC: NORMAL
PATH REPORT.COMMENTS IMP SPEC: NORMAL
PATH REPORT.RELEVANT HX SPEC: NORMAL

## 2023-11-09 ENCOUNTER — MYC MEDICAL ADVICE (OUTPATIENT)
Dept: FAMILY MEDICINE | Facility: OTHER | Age: 33
End: 2023-11-09
Payer: COMMERCIAL

## 2023-11-17 ENCOUNTER — MYC MEDICAL ADVICE (OUTPATIENT)
Dept: FAMILY MEDICINE | Facility: OTHER | Age: 33
End: 2023-11-17
Payer: COMMERCIAL

## 2023-12-01 ENCOUNTER — DOCUMENTATION ONLY (OUTPATIENT)
Dept: MULTI SPECIALTY CLINIC | Facility: CLINIC | Age: 33
End: 2023-12-01

## 2023-12-01 ENCOUNTER — OFFICE VISIT (OUTPATIENT)
Dept: FAMILY MEDICINE | Facility: OTHER | Age: 33
End: 2023-12-01
Attending: FAMILY MEDICINE
Payer: COMMERCIAL

## 2023-12-01 VITALS
BODY MASS INDEX: 29.8 KG/M2 | TEMPERATURE: 98.4 F | DIASTOLIC BLOOD PRESSURE: 80 MMHG | WEIGHT: 173.6 LBS | OXYGEN SATURATION: 98 % | RESPIRATION RATE: 16 BRPM | HEART RATE: 75 BPM | SYSTOLIC BLOOD PRESSURE: 134 MMHG

## 2023-12-01 DIAGNOSIS — Z13.0 SCREENING FOR DEFICIENCY ANEMIA: ICD-10-CM

## 2023-12-01 DIAGNOSIS — D03.72 MELANOMA IN SITU OF LEFT LOWER LEG (H): ICD-10-CM

## 2023-12-01 DIAGNOSIS — D13.4 HEPATIC ADENOMA: ICD-10-CM

## 2023-12-01 DIAGNOSIS — K76.9 LIVER LESION: ICD-10-CM

## 2023-12-01 DIAGNOSIS — K76.0 NAFLD (NONALCOHOLIC FATTY LIVER DISEASE): Primary | ICD-10-CM

## 2023-12-01 DIAGNOSIS — F41.9 ANXIETY: ICD-10-CM

## 2023-12-01 DIAGNOSIS — Z13.1 SCREENING FOR DIABETES MELLITUS: ICD-10-CM

## 2023-12-01 DIAGNOSIS — Z00.00 HEALTH CARE MAINTENANCE: Primary | ICD-10-CM

## 2023-12-01 DIAGNOSIS — Z13.220 SCREENING FOR LIPID DISORDERS: ICD-10-CM

## 2023-12-01 DIAGNOSIS — E03.9 HYPOTHYROIDISM, UNSPECIFIED TYPE: ICD-10-CM

## 2023-12-01 DIAGNOSIS — F40.240 CLAUSTROPHOBIA: ICD-10-CM

## 2023-12-01 DIAGNOSIS — D22.71: ICD-10-CM

## 2023-12-01 LAB
ALBUMIN SERPL BCG-MCNC: 4.9 G/DL (ref 3.5–5.2)
ALP SERPL-CCNC: 72 U/L (ref 40–150)
ALT SERPL W P-5'-P-CCNC: 12 U/L (ref 0–50)
ANION GAP SERPL CALCULATED.3IONS-SCNC: 10 MMOL/L (ref 7–15)
AST SERPL W P-5'-P-CCNC: 14 U/L (ref 0–45)
BASOPHILS # BLD AUTO: 0.1 10E3/UL (ref 0–0.2)
BASOPHILS NFR BLD AUTO: 1 %
BILIRUB SERPL-MCNC: 0.5 MG/DL
BUN SERPL-MCNC: 9.3 MG/DL (ref 6–20)
CALCIUM SERPL-MCNC: 10.5 MG/DL (ref 8.6–10)
CHLORIDE SERPL-SCNC: 106 MMOL/L (ref 98–107)
CHOLEST SERPL-MCNC: 171 MG/DL
CREAT SERPL-MCNC: 0.63 MG/DL (ref 0.51–0.95)
DEPRECATED HCO3 PLAS-SCNC: 25 MMOL/L (ref 22–29)
EGFRCR SERPLBLD CKD-EPI 2021: >90 ML/MIN/1.73M2
EOSINOPHIL # BLD AUTO: 0.1 10E3/UL (ref 0–0.7)
EOSINOPHIL NFR BLD AUTO: 1 %
ERYTHROCYTE [DISTWIDTH] IN BLOOD BY AUTOMATED COUNT: 11.9 % (ref 10–15)
GLUCOSE SERPL-MCNC: 100 MG/DL (ref 70–99)
HBA1C MFR BLD: 5.2 % (ref 4–6.2)
HCT VFR BLD AUTO: 40.1 % (ref 35–47)
HDLC SERPL-MCNC: 43 MG/DL
HGB BLD-MCNC: 13.7 G/DL (ref 11.7–15.7)
IMM GRANULOCYTES # BLD: 0.1 10E3/UL
IMM GRANULOCYTES NFR BLD: 1 %
LDLC SERPL CALC-MCNC: 113 MG/DL
LYMPHOCYTES # BLD AUTO: 2.2 10E3/UL (ref 0.8–5.3)
LYMPHOCYTES NFR BLD AUTO: 35 %
MCH RBC QN AUTO: 30.3 PG (ref 26.5–33)
MCHC RBC AUTO-ENTMCNC: 34.2 G/DL (ref 31.5–36.5)
MCV RBC AUTO: 89 FL (ref 78–100)
MONOCYTES # BLD AUTO: 0.5 10E3/UL (ref 0–1.3)
MONOCYTES NFR BLD AUTO: 8 %
NEUTROPHILS # BLD AUTO: 3.5 10E3/UL (ref 1.6–8.3)
NEUTROPHILS NFR BLD AUTO: 54 %
NONHDLC SERPL-MCNC: 128 MG/DL
NRBC # BLD AUTO: 0 10E3/UL
NRBC BLD AUTO-RTO: 0 /100
PLATELET # BLD AUTO: 330 10E3/UL (ref 150–450)
POTASSIUM SERPL-SCNC: 4.3 MMOL/L (ref 3.4–5.3)
PROT SERPL-MCNC: 7.7 G/DL (ref 6.4–8.3)
RBC # BLD AUTO: 4.52 10E6/UL (ref 3.8–5.2)
SODIUM SERPL-SCNC: 141 MMOL/L (ref 135–145)
TRIGL SERPL-MCNC: 75 MG/DL
TSH SERPL DL<=0.005 MIU/L-ACNC: 1.19 UIU/ML (ref 0.3–4.2)
WBC # BLD AUTO: 6.4 10E3/UL (ref 4–11)

## 2023-12-01 PROCEDURE — 99395 PREV VISIT EST AGE 18-39: CPT | Mod: 25 | Performed by: FAMILY MEDICINE

## 2023-12-01 PROCEDURE — 85014 HEMATOCRIT: CPT | Mod: ZL | Performed by: FAMILY MEDICINE

## 2023-12-01 PROCEDURE — 83718 ASSAY OF LIPOPROTEIN: CPT | Mod: ZL | Performed by: FAMILY MEDICINE

## 2023-12-01 PROCEDURE — 84443 ASSAY THYROID STIM HORMONE: CPT | Mod: ZL | Performed by: FAMILY MEDICINE

## 2023-12-01 PROCEDURE — 36415 COLL VENOUS BLD VENIPUNCTURE: CPT | Mod: ZL | Performed by: FAMILY MEDICINE

## 2023-12-01 PROCEDURE — 83036 HEMOGLOBIN GLYCOSYLATED A1C: CPT | Mod: ZL | Performed by: FAMILY MEDICINE

## 2023-12-01 PROCEDURE — 80053 COMPREHEN METABOLIC PANEL: CPT | Mod: ZL | Performed by: FAMILY MEDICINE

## 2023-12-01 RX ORDER — ALPRAZOLAM 1 MG
TABLET ORAL
Qty: 2 TABLET | Refills: 0 | Status: SHIPPED | OUTPATIENT
Start: 2023-12-01

## 2023-12-01 RX ORDER — LEVOTHYROXINE SODIUM 137 UG/1
137 TABLET ORAL DAILY
Qty: 90 TABLET | Refills: 4 | Status: SHIPPED | OUTPATIENT
Start: 2023-12-01

## 2023-12-01 RX ORDER — KETOCONAZOLE 20 MG/G
CREAM TOPICAL
COMMUNITY
Start: 2023-11-17

## 2023-12-01 ASSESSMENT — ENCOUNTER SYMPTOMS
CHILLS: 0
CONSTIPATION: 0
HEMATURIA: 0
ABDOMINAL PAIN: 0
COUGH: 0
HEMATOCHEZIA: 0

## 2023-12-01 ASSESSMENT — PAIN SCALES - GENERAL: PAINLEVEL: NO PAIN (0)

## 2023-12-01 NOTE — PROGRESS NOTES
SUBJECTIVE:   Amanda is a 33 year old, presenting for the following:  Physical        12/1/2023     9:43 AM   Additional Questions   Roomed by Meena Diazpoonam     Had a right spitz nevus reexcision on her right leg lately.  Had a spot on her left lower back removed that was a benign mole.  She has a prior history of melanoma.    Has follow up at [place] next week of her liver.  Needs xanax prior to the MRI.  She has had a lesion on her liver which was felt to be focal nodular hyperplasia.    When she was a child, had a heart murmur.  Had an echocardiogram after that and went to [place] and was told it was a benign murmur.    Healthy Habits:     Getting at least 3 servings of Calcium per day:  Yes    Bi-annual eye exam:  Yes    Dental care twice a year:  Yes    Sleep apnea or symptoms of sleep apnea:  None    Diet:  Breakfast skipped    Frequency of exercise:  2-3 days/week    Duration of exercise:  15-30 minutes    Taking medications regularly:  Yes    Medication side effects:  Other    Additional concerns today:  No      Today's PHQ-2 Score:       12/1/2023     9:41 AM   PHQ-2 ( 1999 [employer])   Q1: Little interest or pleasure in doing things 0   Q2: Feeling down, depressed or hopeless 0   PHQ-2 Score 0   Q1: Little interest or pleasure in doing things Not at all   Q2: Feeling down, depressed or hopeless Not at all   PHQ-2 Score 0       Have you ever done Advance Care Planning? (For example, a Health Directive, POLST, or a discussion with a medical provider or your loved ones about your wishes): No, advance care planning information given to patient to review.  Patient declined advance care planning discussion at this time.    Social History     Tobacco Use    Smoking status: Never    Smokeless tobacco: Never   Substance Use Topics    Alcohol use: No             12/1/2023     9:41 AM   Alcohol Use   Prescreen: >3 drinks/day or >7 drinks/week? Not Applicable     Reviewed orders with patient.  Reviewed health maintenance  and updated orders accordingly - Yes  BP Readings from Last 3 Encounters:   23 134/80   10/27/23 122/80   23 126/72    Wt Readings from Last 3 Encounters:   23 78.7 kg (173 lb 9.6 oz)   10/27/23 76.2 kg (168 lb)   23 83.9 kg (185 lb)                  Patient Active Problem List   Diagnosis    Ganglion cyst    Recurrent cold sores    Hypothyroidism, unspecified type    Chronic seasonal allergic rhinitis, unspecified trigger    Encounter for contraceptive management, unspecified type    Pregnancy, unspecified gestational age    Gestational diabetes mellitus (GDM) in third trimester, gestational diabetes method of control unspecified    Encounter for triage in pregnant patient    Pregnancy    History of  section    Melanoma in situ of left lower leg (H)    Abnormal ultrasonic finding on  screening of mother    Diet controlled gestational diabetes mellitus (GDM) in third trimester    History of     Class 1 obesity with serious comorbidity and body mass index (BMI) of 32.0 to 32.9 in adult, unspecified obesity type    History of melanoma in situ     Past Surgical History:   Procedure Laterality Date    ADENOIDECTOMY      as a child    AS SHOULDER ARTHROSCOPY, DX Right     Dr. Bolivar - age 12     SECTION N/A 8/3/2019    Procedure:  SECTION;  Surgeon: Omar Flower MD;  Location: [facility] OR     SECTION N/A 10/11/2021    Procedure:  SECTION;  Surgeon: Omar Flower MD;  Location:  OR    MAMMOPLASTY REDUCTION      2007    PE TUBES      as a child       Social History     Tobacco Use    Smoking status: Never    Smokeless tobacco: Never   Substance Use Topics    Alcohol use: No     Family History   Problem Relation Age of Onset    Family History Negative Mother     Lupus Father     Family History Negative Brother     Kidney Disease Maternal Grandmother     Bladder Cancer Maternal Grandfather     Family History Negative Paternal Grandmother      Diabetes Paternal Grandfather          Current Outpatient Medications   Medication Sig Dispense Refill    ALPRAZolam (XANAX) 1 MG tablet Take 1 by mouth 30 minutes prior to MRI.  May repeat x 1 if needed. 2 tablet 0    fluticasone (FLONASE) 50 MCG/ACT nasal spray Spray 2 sprays into both nostrils daily as needed      ketoconazole (NIZORAL) 2 % external cream Apply  topically one time a day. To areas of tinea versicolor on trunk for 2 - 4 weeks as needed  Indications: Infection      levothyroxine (SYNTHROID/LEVOTHROID) 137 MCG tablet Take 1 tablet (137 mcg) by mouth daily 90 tablet 4     Allergies   Allergen Reactions    Amoxicillin Hives     Childhood       Breast Cancer Screening:    FHS-7:       12/1/2023     9:42 AM   Breast CA Risk Assessment (FHS-7)   Did any of your first-degree relatives have breast or ovarian cancer? No   Did any of your relatives have bilateral breast cancer? Unknown   Did any man in your family have breast cancer? No   Did any woman in your family have breast and ovarian cancer? Yes   Did any woman in your family have breast cancer before age 50 y? No   Do you have 2 or more relatives with breast and/or ovarian cancer? No   Do you have 2 or more relatives with breast and/or bowel cancer? No       Patient under 40 years of age: Routine Mammogram Screening not recommended.   Pertinent mammograms are reviewed under the imaging tab.    History of abnormal Pap smear: NO - age 30-65 PAP every 5 years with negative HPV co-testing recommended      Latest Ref Rng & Units 10/27/2023     3:43 PM 1/21/2021     8:43 AM   PAP / HPV   PAP  Negative for Intraepithelial Lesion or Malignancy (NILM)     PAP (Historical)   NIL    HPV 16 DNA Negative Negative  Negative    HPV 18 DNA Negative Negative  Negative    Other HR HPV Negative Negative  Negative      Reviewed and updated as needed this visit by clinical staff   Tobacco  Allergies  Meds              Reviewed and updated as needed this visit by  Provider                 Past Medical History:   Diagnosis Date    Cyst of right ovary     2016    Diet controlled gestational diabetes mellitus (GDM) in third trimester     Herpesviral vesicular dermatitis     2016    Hypothyroidism     2015    Melanoma of skin (H) 2021    NAFLD (nonalcoholic fatty liver disease)       Past Surgical History:   Procedure Laterality Date    ADENOIDECTOMY      as a child    AS SHOULDER ARTHROSCOPY, DX Right     Dr. Bolivar - age 12     SECTION N/A 8/3/2019    Procedure:  SECTION;  Surgeon: Omar Flower MD;  Location: [facility] OR     SECTION N/A 10/11/2021    Procedure:  SECTION;  Surgeon: Omar Flower MD;  Location: [facility] OR    MAMMOPLASTY REDUCTION          PE TUBES      as a child       Review of Systems   Constitutional:  Negative for chills.   HENT:  Negative for congestion.    Respiratory:  Negative for cough.    Cardiovascular:  Negative for chest pain.   Gastrointestinal:  Negative for abdominal pain, constipation and hematochezia.   Genitourinary:  Negative for hematuria.          OBJECTIVE:   /80   Pulse 75   Temp 98.4  F (36.9  C) (Tympanic)   Resp 16   Wt 78.7 kg (173 lb 9.6 oz)   LMP 2023   SpO2 98%   Breastfeeding No   BMI 29.80 kg/m    Physical Exam  Constitutional:       General: She is not in acute distress.     Appearance: She is well-developed.   HENT:      Head: Normocephalic.      Right Ear: Tympanic membrane and external ear normal.      Left Ear: Tympanic membrane and external ear normal.      Nose: Nose normal.      Mouth/Throat:      Mouth: Mucous membranes are moist.      Pharynx: Oropharynx is clear. No oropharyngeal exudate or posterior oropharyngeal erythema.   Eyes:      General:         Right eye: No discharge.         Left eye: No discharge.      Conjunctiva/sclera: Conjunctivae normal.      Pupils: Pupils are equal, round, and reactive to light.   Neck:      Thyroid: No  thyromegaly.      Trachea: No tracheal deviation.   Cardiovascular:      Rate and Rhythm: Normal rate and regular rhythm.      Pulses: Normal pulses.      Heart sounds: Normal heart sounds, S1 normal and S2 normal. No murmur heard.     No friction rub. No gallop. No S3 or S4 sounds.   Pulmonary:      Effort: Pulmonary effort is normal. No respiratory distress.      Breath sounds: Normal breath sounds. No wheezing or rales.      Comments: Breast exam: Scars from prior breast reduction.  No masses palpable bilaterally.  No skin changes, tethering or axillary lymphadenopathy bilaterally.    Abdominal:      General: Bowel sounds are normal. There is no distension.      Palpations: Abdomen is soft. There is no mass.      Tenderness: There is no abdominal tenderness.   Genitourinary:     Comments: Pelvic/Rectal exams deferred per patient.  Musculoskeletal:         General: Normal range of motion.      Cervical back: Neck supple.   Lymphadenopathy:      Cervical: No cervical adenopathy.   Skin:     General: Skin is warm and dry.      Findings: No rash.      Comments: Sutures from wound on her right anterior thigh and also her back were removed today without difficulty.  Wounds appear to be healing well without redness or drainage.  Steri-Strips were applied.   Neurological:      Mental Status: She is alert and oriented to person, place, and time.      Motor: No abnormal muscle tone.      Deep Tendon Reflexes: Reflexes are normal and symmetric.   Psychiatric:         Mood and Affect: Mood normal.         Thought Content: Thought content normal.         Judgment: Judgment normal.         ASSESSMENT/PLAN:       ICD-10-CM    1. Health care maintenance  Z00.00       2. Anxiety  F41.9 ALPRAZolam (XANAX) 1 MG tablet      3. Claustrophobia  F40.240 ALPRAZolam (XANAX) 1 MG tablet      4. Liver lesion  K76.9 Hemoglobin A1c     CANCELED: CBC with platelets      5. Melanoma in situ of left lower leg (H)  D03.72       6. Spitz nevus of  "right thigh  D22.71 REMOVAL OF SUTURES      7. Hypothyroidism, unspecified type  E03.9 TSH Reflex [facility]     levothyroxine (SYNTHROID/LEVOTHROID) 137 MCG tablet     TSH Reflex [facility]      8. Screening for diabetes mellitus  Z13.1 Comprehensive metabolic panel     Comprehensive metabolic panel      9. Screening for deficiency anemia  Z13.0 CBC with Platelets & Differential     CBC with Platelets & Differential      10. Screening for lipid disorders  Z13.220 Lipid Profile     Lipid Profile        1.  Pap/HPV is up-to-date, last completed 10/27/2023 and were both normal at that time.  Tdap last completed 7/26/2021.  She declines flu and COVID vaccines.  Also declines pneumococcal pneumonia vaccine and hepatitis C screening due to low risk.  2.  Prescription for Xanax given to use prior to MRI.  3.  See #2.  4.  See #2.  Has upcoming MRI and follow-up at [facility] scheduled.  5.  Continues to follow with dermatology.    6.  Sutures from 2 recent excisions were removed today.  The lesion on her right thigh was a Spitz nevus and the lesion on her back was benign per patient.  7.  Stable.  Refills as above.  TSH as above.  8.  Labs ordered as above.  9.  CBC ordered as above.  10.  Lipid profile ordered as above.    Patient has been advised of split billing requirements and indicates understanding: Yes      COUNSELING:  Reviewed preventive health counseling, as reflected in patient instructions       Regular exercise       Healthy diet/nutrition       Vision screening       Consider Hep C screening for all patients one time for ages 18-79 years      BMI:   Estimated body mass index is 29.8 kg/m  as calculated from the following:    Height as of 5/21/23: 1.626 m (5' 4\").    Weight as of this encounter: 78.7 kg (173 lb 9.6 oz).   Weight management plan: Discussed healthy diet and exercise guidelines      She reports that she has never smoked. She has never used smokeless tobacco.        Liza Ordoñez MD  [facility] AND " [facility]

## 2023-12-01 NOTE — NURSING NOTE
Chief Complaint   Patient presents with    Physical         Medication Reconciliation: complete    Meena De Oliveira, LPN

## 2023-12-02 ENCOUNTER — MYC MEDICAL ADVICE (OUTPATIENT)
Dept: FAMILY MEDICINE | Facility: OTHER | Age: 33
End: 2023-12-02
Payer: COMMERCIAL

## 2023-12-02 DIAGNOSIS — E83.52 HYPERCALCEMIA: Primary | ICD-10-CM

## 2023-12-04 NOTE — TELEPHONE ENCOUNTER
12/1/23 labs:    Gluc 100  Hgb A1C  5.2  Calcium 10.5   AST 14 and ALT 12 normal.      Di Whatley RN on 12/4/2023 at 9:09 AM

## 2023-12-15 ENCOUNTER — LAB (OUTPATIENT)
Dept: LAB | Facility: OTHER | Age: 33
End: 2023-12-15
Attending: FAMILY MEDICINE
Payer: COMMERCIAL

## 2023-12-15 DIAGNOSIS — E83.52 HYPERCALCEMIA: ICD-10-CM

## 2023-12-15 PROCEDURE — 83970 ASSAY OF PARATHORMONE: CPT | Mod: ZL

## 2023-12-15 PROCEDURE — 82330 ASSAY OF CALCIUM: CPT | Mod: ZL

## 2023-12-15 PROCEDURE — 36415 COLL VENOUS BLD VENIPUNCTURE: CPT | Mod: ZL

## 2023-12-16 LAB
CA-I BLD-MCNC: 4.9 MG/DL (ref 4.4–5.2)
PTH-INTACT SERPL-MCNC: 49 PG/ML (ref 15–65)

## 2023-12-26 DIAGNOSIS — E03.9 HYPOTHYROIDISM, UNSPECIFIED TYPE: ICD-10-CM

## 2023-12-27 RX ORDER — LEVOTHYROXINE SODIUM 137 UG/1
137 TABLET ORAL DAILY
Qty: 90 TABLET | Refills: 4 | OUTPATIENT
Start: 2023-12-27

## 2023-12-27 NOTE — TELEPHONE ENCOUNTER
[facility] sent Rx request for the following:      Requested Prescriptions   Pending Prescriptions Disp Refills    levothyroxine (SYNTHROID/LEVOTHROID) 137 MCG tablet [Pharmacy Med Name: LEVOTHYROXINE 0.137MG (137MCG) TAB] 90 tablet 4     Sig: TAKE 1 TABLET(137 MCG) BY MOUTH DAILY          Last Prescription Date:   12/1/23  Last Fill Qty/Refills:         90, R-4        Duplicate request.     KIMBER COULTER RN on 12/27/2023 at 3:54 PM

## 2024-01-13 ENCOUNTER — MYC MEDICAL ADVICE (OUTPATIENT)
Dept: FAMILY MEDICINE | Facility: OTHER | Age: 34
End: 2024-01-13
Payer: COMMERCIAL

## 2024-08-07 ENCOUNTER — MYC MEDICAL ADVICE (OUTPATIENT)
Dept: FAMILY MEDICINE | Facility: OTHER | Age: 34
End: 2024-08-07
Payer: COMMERCIAL

## 2024-08-07 DIAGNOSIS — D03.72 MELANOMA IN SITU OF LEFT LOWER LEG (H): ICD-10-CM

## 2024-08-07 DIAGNOSIS — Z12.83 SCREENING FOR SKIN CANCER: Primary | ICD-10-CM

## 2024-08-21 ENCOUNTER — MYC MEDICAL ADVICE (OUTPATIENT)
Dept: FAMILY MEDICINE | Facility: OTHER | Age: 34
End: 2024-08-21
Payer: COMMERCIAL

## 2024-08-22 NOTE — TELEPHONE ENCOUNTER
Clarifying question(s) sent to patient.      If needing insurance referral- will have to forward on to [facility].      Di Whatley RN on 8/22/2024 at 12:25 PM

## 2024-08-22 NOTE — TELEPHONE ENCOUNTER
Messaged [facility] to get an update on the insurance part of the referral.    Roland Bliss RN on 8/22/2024 at 2:04 PM

## 2024-09-15 ENCOUNTER — MYC MEDICAL ADVICE (OUTPATIENT)
Dept: FAMILY MEDICINE | Facility: OTHER | Age: 34
End: 2024-09-15
Payer: COMMERCIAL

## 2024-09-15 DIAGNOSIS — K76.0 NON-ALCOHOLIC FATTY LIVER DISEASE: ICD-10-CM

## 2024-09-15 DIAGNOSIS — R93.2 ABNORMAL LIVER ULTRASOUND: Primary | ICD-10-CM

## 2024-09-17 NOTE — TELEPHONE ENCOUNTER
Pt is requesting the following:    MR liver for non-alcoholic fatty liver   Referral for Dr Kelly Gamble  Referral for Dr Yessica Garcia      Dale'd up GI referral to include both providers at [facility]. Dale'd up MR liver.    Routing to provider to review and respond.  Roland Bliss RN on 9/17/2024 at 2:29 PM

## 2024-09-27 ENCOUNTER — TELEPHONE (OUTPATIENT)
Dept: FAMILY MEDICINE | Facility: OTHER | Age: 34
End: 2024-09-27
Payer: COMMERCIAL

## 2024-09-27 DIAGNOSIS — R93.2 ABNORMAL LIVER ULTRASOUND: ICD-10-CM

## 2024-09-27 DIAGNOSIS — K76.0 NON-ALCOHOLIC FATTY LIVER DISEASE: Primary | ICD-10-CM

## 2024-09-27 NOTE — TELEPHONE ENCOUNTER
Fax received from [facility] stating they are unable to complete the request for MRI liver. The test is not orderable for an outside provider at this time. The patient would need to start with a consult in a specialty area, based on the diagnosis/ need for the test. If the [facility] provider feels the test is appropriate to be ordered, they will do so at the time.     Patient informed via Sofiya Cm CMA on 9/27/2024 at 7:54 AM

## 2024-10-01 ENCOUNTER — TELEPHONE (OUTPATIENT)
Dept: FAMILY MEDICINE | Facility: OTHER | Age: 34
End: 2024-10-01
Payer: COMMERCIAL

## 2024-10-01 NOTE — TELEPHONE ENCOUNTER
Called and left message for patient to please return call. Calling Follow-up on Friday conversation.     Meena De Oliveira LPN on 10/1/2024 at 12:14 PM

## 2024-10-01 NOTE — TELEPHONE ENCOUNTER
Nothing needed at this time. She will call/message is she needs anything.     Meena De Oliveira LPN on 10/1/2024 at 12:28 PM

## 2025-01-07 ENCOUNTER — MYC MEDICAL ADVICE (OUTPATIENT)
Dept: FAMILY MEDICINE | Facility: OTHER | Age: 35
End: 2025-01-07
Payer: COMMERCIAL

## 2025-01-09 NOTE — TELEPHONE ENCOUNTER
Upcoming appt at [facility] on 1/21.  Needs insurance referral.      [facility] [address] [facility] [place] [address].  Tax ID: [id number]/[id number]    Procedures:  [id number]  Diagnosis K76.89 -other specified diseases of liver      [facility]- [address] [place] [address]- Tax ID: [id number]/NPI [id number]    Procedures: [id number]  Diagnosis: K76.89  -other specified diseases of liver        [facility] needs.      Patient update on MyChart.    Di Whatley RN on 1/8/2025 at 3:53 PM    
Update from [facility]:  Certification # [id number]     [facility]    Di Whatley RN on 1/9/2025 at 9:21 AM    
detailed exam

## 2025-01-12 ENCOUNTER — HEALTH MAINTENANCE LETTER (OUTPATIENT)
Age: 35
End: 2025-01-12

## 2025-01-14 ENCOUNTER — LAB (OUTPATIENT)
Dept: LAB | Facility: OTHER | Age: 35
End: 2025-01-14
Attending: FAMILY MEDICINE
Payer: COMMERCIAL

## 2025-01-14 DIAGNOSIS — E03.9 HYPOTHYROIDISM, UNSPECIFIED TYPE: ICD-10-CM

## 2025-01-14 LAB — TSH SERPL DL<=0.005 MIU/L-ACNC: 1.32 UIU/ML (ref 0.3–4.2)

## 2025-01-14 PROCEDURE — 84443 ASSAY THYROID STIM HORMONE: CPT | Mod: ZL

## 2025-01-14 PROCEDURE — 36415 COLL VENOUS BLD VENIPUNCTURE: CPT | Mod: ZL

## 2025-01-27 ENCOUNTER — MYC MEDICAL ADVICE (OUTPATIENT)
Dept: FAMILY MEDICINE | Facility: OTHER | Age: 35
End: 2025-01-27
Payer: COMMERCIAL

## 2025-01-27 DIAGNOSIS — E03.9 HYPOTHYROIDISM, UNSPECIFIED TYPE: ICD-10-CM

## 2025-01-27 DIAGNOSIS — A09 TRAVELER'S DIARRHEA: ICD-10-CM

## 2025-01-28 RX ORDER — AZITHROMYCIN 250 MG/1
500 TABLET, FILM COATED ORAL DAILY
Qty: 6 TABLET | Refills: 0 | Status: SHIPPED | OUTPATIENT
Start: 2025-01-28

## 2025-01-28 RX ORDER — LEVOTHYROXINE SODIUM 137 UG/1
137 TABLET ORAL DAILY
Qty: 90 TABLET | Refills: 4 | Status: SHIPPED | OUTPATIENT
Start: 2025-01-28

## 2025-01-28 NOTE — TELEPHONE ENCOUNTER
I have signed the orders for azithromycin but advise you do not take it unless absolutely necessary for traveler's diarrhea.   I have signed orders to refill your thyroid medication.     Sincerely,   Meena Phelan NP on 1/28/2025 at 11:45 AM

## 2025-01-28 NOTE — TELEPHONE ENCOUNTER
Pt is wondering if she can get an Rx for Azithromycin for Travelers diarrhea like last time. Needs refill of Synthroid.     Azithromycin Last ordered 2/7/23  Synthroid Rx needs to be moved to [facility].     Dale'd up orders.     Routing to provider to review and respond.  Roland Bliss RN on 1/28/2025 at 9:19 AM

## 2025-06-08 NOTE — PROGRESS NOTES
Incentive Spirometry education completed.  Pt goal 2600 mls.  Pt achieved 1000 mls.  Pt instructed to perform 10/hr while awake with at least one deep breath and cough per hour until able to perform baseline activity.  RT will follow and re-assess as need.      Alycia Gibbs, RT on 8/3/2019 at 9:59 AM         [facility] See MDM I performed a history and physical exam of the patient and discussed their management with the Physician assistant reviewed the PAs note and agree with the documented findings and plan of care. My medical decision making and observations are found above.

## (undated) DEVICE — ESU PENCIL SMOKE EVAC W/ROCKER SWITCH 0703-047-000

## (undated) DEVICE — RETR PANNICULUS TRAXI FOR PT POSITIONING PRS-1030

## (undated) DEVICE — DRSG MEDIPORE 3 1/2X8" 3570

## (undated) DEVICE — PAD ABD 5X9" STERILE 9190A

## (undated) DEVICE — STPL SKIN SUBCUTICULAR INSORB  2030

## (undated) DEVICE — SU CHROMIC 1 CTX 36" 905H

## (undated) DEVICE — COVER LIGHT HANDLE LT-F02

## (undated) DEVICE — SU VICRYL 0 CTX 36" J370H

## (undated) DEVICE — SOL WATER 1500ML

## (undated) DEVICE — PAD CHUX UNDERPAD 30X36" P3036C

## (undated) DEVICE — Device

## (undated) DEVICE — SLEEVE COMPRESSION SCD KNEE MED 74022

## (undated) DEVICE — SU PDS II 0 CTX 60" Z990G

## (undated) DEVICE — PREP CHLORAPREP 26ML TINTED ORANGE  260815

## (undated) DEVICE — CATH TRAY FOLEY SURESTEP 16FR W/URINE MTR STATLK LF A303416A

## (undated) DEVICE — ESU GROUND PAD ADULT W/CORD E7507

## (undated) DEVICE — PACK C SECTION SMA15CSFCA

## (undated) DEVICE — DRSG STERI STRIP 1/2X4" R1547

## (undated) DEVICE — GLOVE PROTEXIS POWDER FREE SMT 7.5  2D72PT75X

## (undated) DEVICE — SU VICRYL 3-0 CT 36" J356H

## (undated) DEVICE — ADH LIQUID MASTISOL TOPICAL VIAL 2-3ML 0523-48

## (undated) RX ORDER — DEXAMETHASONE SODIUM PHOSPHATE 10 MG/ML
INJECTION, SOLUTION INTRAMUSCULAR; INTRAVENOUS
Status: DISPENSED
Start: 2021-10-11

## (undated) RX ORDER — OXYTOCIN 10 [USP'U]/ML
INJECTION, SOLUTION INTRAMUSCULAR; INTRAVENOUS
Status: DISPENSED
Start: 2019-08-03

## (undated) RX ORDER — CHLOROPROCAINE HYDROCHLORIDE 20 MG/ML
INJECTION, SOLUTION EPIDURAL; INFILTRATION; INTRACAUDAL; PERINEURAL
Status: DISPENSED
Start: 2019-08-03

## (undated) RX ORDER — SODIUM CHLORIDE, SODIUM LACTATE, POTASSIUM CHLORIDE, CALCIUM CHLORIDE 600; 310; 30; 20 MG/100ML; MG/100ML; MG/100ML; MG/100ML
INJECTION, SOLUTION INTRAVENOUS
Status: DISPENSED
Start: 2021-10-11

## (undated) RX ORDER — LIDOCAINE HYDROCHLORIDE 20 MG/ML
INJECTION, SOLUTION EPIDURAL; INFILTRATION; INTRACAUDAL; PERINEURAL
Status: DISPENSED
Start: 2019-08-03

## (undated) RX ORDER — DEXMEDETOMIDINE HYDROCHLORIDE 100 UG/ML
INJECTION, SOLUTION INTRAVENOUS
Status: DISPENSED
Start: 2021-10-11

## (undated) RX ORDER — BUPIVACAINE HYDROCHLORIDE 2.5 MG/ML
INJECTION, SOLUTION INFILTRATION; PERINEURAL
Status: DISPENSED
Start: 2021-10-11

## (undated) RX ORDER — ONDANSETRON 2 MG/ML
INJECTION INTRAMUSCULAR; INTRAVENOUS
Status: DISPENSED
Start: 2021-10-11

## (undated) RX ORDER — MORPHINE SULFATE 0.5 MG/ML
INJECTION, SOLUTION EPIDURAL; INTRATHECAL; INTRAVENOUS
Status: DISPENSED
Start: 2019-08-03

## (undated) RX ORDER — AZITHROMYCIN 500 MG/5ML
INJECTION, POWDER, LYOPHILIZED, FOR SOLUTION INTRAVENOUS
Status: DISPENSED
Start: 2019-08-03

## (undated) RX ORDER — PROPOFOL 10 MG/ML
INJECTION, EMULSION INTRAVENOUS
Status: DISPENSED
Start: 2019-08-03

## (undated) RX ORDER — KETOROLAC TROMETHAMINE 30 MG/ML
INJECTION, SOLUTION INTRAMUSCULAR; INTRAVENOUS
Status: DISPENSED
Start: 2021-10-11

## (undated) RX ORDER — CEFAZOLIN SODIUM 2 G/100ML
INJECTION, SOLUTION INTRAVENOUS
Status: DISPENSED
Start: 2021-10-11

## (undated) RX ORDER — EPHEDRINE SULFATE 50 MG/ML
INJECTION, SOLUTION INTRAVENOUS
Status: DISPENSED
Start: 2019-08-03

## (undated) RX ORDER — OXYTOCIN/0.9 % SODIUM CHLORIDE 30/500 ML
PLASTIC BAG, INJECTION (ML) INTRAVENOUS
Status: DISPENSED
Start: 2021-10-11

## (undated) RX ORDER — PHENYLEPHRINE HCL IN 0.9% NACL 20MG/250ML
PLASTIC BAG, INJECTION (ML) INTRAVENOUS
Status: DISPENSED
Start: 2019-08-03

## (undated) RX ORDER — FENTANYL CITRATE 50 UG/ML
INJECTION, SOLUTION INTRAMUSCULAR; INTRAVENOUS
Status: DISPENSED
Start: 2019-08-03

## (undated) RX ORDER — PHENYLEPHRINE HYDROCHLORIDE 10 MG/ML
INJECTION INTRAVENOUS
Status: DISPENSED
Start: 2021-10-11

## (undated) RX ORDER — SODIUM CHLORIDE 9 MG/ML
INJECTION, SOLUTION INTRAVENOUS
Status: DISPENSED
Start: 2021-10-11

## (undated) RX ORDER — ACETAMINOPHEN 10 MG/ML
INJECTION, SOLUTION INTRAVENOUS
Status: DISPENSED
Start: 2021-10-11